# Patient Record
Sex: FEMALE | Race: WHITE | NOT HISPANIC OR LATINO | Employment: OTHER | URBAN - METROPOLITAN AREA
[De-identification: names, ages, dates, MRNs, and addresses within clinical notes are randomized per-mention and may not be internally consistent; named-entity substitution may affect disease eponyms.]

---

## 2017-12-28 ENCOUNTER — TRANSFERRED RECORDS (OUTPATIENT)
Dept: HEALTH INFORMATION MANAGEMENT | Facility: CLINIC | Age: 67
End: 2017-12-28

## 2018-01-12 RX ORDER — CETIRIZINE HYDROCHLORIDE 10 MG/1
10 TABLET ORAL DAILY
Status: ON HOLD | COMMUNITY
End: 2018-01-23

## 2018-01-12 RX ORDER — PRENATAL VIT/IRON FUM/FOLIC AC 27MG-0.8MG
1 TABLET ORAL DAILY
COMMUNITY
End: 2019-06-12

## 2018-01-12 RX ORDER — HYDROCODONE BITARTRATE AND ACETAMINOPHEN 5; 325 MG/1; MG/1
1 TABLET ORAL EVERY 6 HOURS PRN
Status: ON HOLD | COMMUNITY
End: 2018-01-23

## 2018-01-12 RX ORDER — MONTELUKAST SODIUM 10 MG/1
1 TABLET ORAL AT BEDTIME
COMMUNITY

## 2018-01-12 RX ORDER — FLUTICASONE PROPIONATE 50 MCG
2 SPRAY, SUSPENSION (ML) NASAL DAILY
Status: ON HOLD | COMMUNITY
End: 2020-01-07

## 2018-01-12 RX ORDER — ALBUTEROL SULFATE 90 UG/1
2 AEROSOL, METERED RESPIRATORY (INHALATION) 2 TIMES DAILY PRN
COMMUNITY

## 2018-01-23 ENCOUNTER — HOSPITAL ENCOUNTER (INPATIENT)
Facility: CLINIC | Age: 68
LOS: 2 days | Discharge: HOME | DRG: 470 | End: 2018-01-25
Attending: ORTHOPAEDIC SURGERY | Admitting: ORTHOPAEDIC SURGERY
Payer: COMMERCIAL

## 2018-01-23 ENCOUNTER — ANESTHESIA EVENT (OUTPATIENT)
Dept: SURGERY | Facility: CLINIC | Age: 68
DRG: 470 | End: 2018-01-23
Payer: COMMERCIAL

## 2018-01-23 ENCOUNTER — ANESTHESIA (OUTPATIENT)
Dept: SURGERY | Facility: CLINIC | Age: 68
DRG: 470 | End: 2018-01-23
Payer: COMMERCIAL

## 2018-01-23 ENCOUNTER — APPOINTMENT (OUTPATIENT)
Dept: PHYSICAL THERAPY | Facility: CLINIC | Age: 68
DRG: 470 | End: 2018-01-23
Attending: ORTHOPAEDIC SURGERY
Payer: COMMERCIAL

## 2018-01-23 DIAGNOSIS — Z96.651 STATUS POST TOTAL RIGHT KNEE REPLACEMENT: Primary | ICD-10-CM

## 2018-01-23 PROBLEM — Z96.659 TOTAL KNEE REPLACEMENT STATUS: Status: ACTIVE | Noted: 2018-01-23

## 2018-01-23 LAB — GLUCOSE BLDC GLUCOMTR-MCNC: 127 MG/DL (ref 70–99)

## 2018-01-23 PROCEDURE — 25000128 H RX IP 250 OP 636: Performed by: ORTHOPAEDIC SURGERY

## 2018-01-23 PROCEDURE — 25000132 ZZH RX MED GY IP 250 OP 250 PS 637: Mod: GY | Performed by: ORTHOPAEDIC SURGERY

## 2018-01-23 PROCEDURE — 25000128 H RX IP 250 OP 636: Performed by: ANESTHESIOLOGY

## 2018-01-23 PROCEDURE — 25000125 ZZHC RX 250: Performed by: NURSE ANESTHETIST, CERTIFIED REGISTERED

## 2018-01-23 PROCEDURE — 97110 THERAPEUTIC EXERCISES: CPT | Mod: GP

## 2018-01-23 PROCEDURE — 36000093 ZZH SURGERY LEVEL 4 1ST 30 MIN: Performed by: ORTHOPAEDIC SURGERY

## 2018-01-23 PROCEDURE — 40000170 ZZH STATISTIC PRE-PROCEDURE ASSESSMENT II: Performed by: ORTHOPAEDIC SURGERY

## 2018-01-23 PROCEDURE — 27110028 ZZH OR GENERAL SUPPLY NON-STERILE: Performed by: ORTHOPAEDIC SURGERY

## 2018-01-23 PROCEDURE — 71000012 ZZH RECOVERY PHASE 1 LEVEL 1 FIRST HR: Performed by: ORTHOPAEDIC SURGERY

## 2018-01-23 PROCEDURE — 40000193 ZZH STATISTIC PT WARD VISIT

## 2018-01-23 PROCEDURE — 00000146 ZZHCL STATISTIC GLUCOSE BY METER IP

## 2018-01-23 PROCEDURE — 0SRC0J9 REPLACEMENT OF RIGHT KNEE JOINT WITH SYNTHETIC SUBSTITUTE, CEMENTED, OPEN APPROACH: ICD-10-PCS | Performed by: ORTHOPAEDIC SURGERY

## 2018-01-23 PROCEDURE — 25000128 H RX IP 250 OP 636: Performed by: NURSE ANESTHETIST, CERTIFIED REGISTERED

## 2018-01-23 PROCEDURE — 36000063 ZZH SURGERY LEVEL 4 EA 15 ADDTL MIN: Performed by: ORTHOPAEDIC SURGERY

## 2018-01-23 PROCEDURE — 12000007 ZZH R&B INTERMEDIATE

## 2018-01-23 PROCEDURE — 25800025 ZZH RX 258: Performed by: ORTHOPAEDIC SURGERY

## 2018-01-23 PROCEDURE — 25000125 ZZHC RX 250: Performed by: ORTHOPAEDIC SURGERY

## 2018-01-23 PROCEDURE — 71000013 ZZH RECOVERY PHASE 1 LEVEL 1 EA ADDTL HR: Performed by: ORTHOPAEDIC SURGERY

## 2018-01-23 PROCEDURE — C1776 JOINT DEVICE (IMPLANTABLE): HCPCS | Performed by: ORTHOPAEDIC SURGERY

## 2018-01-23 PROCEDURE — 27210794 ZZH OR GENERAL SUPPLY STERILE: Performed by: ORTHOPAEDIC SURGERY

## 2018-01-23 PROCEDURE — 97530 THERAPEUTIC ACTIVITIES: CPT | Mod: GP

## 2018-01-23 PROCEDURE — 97161 PT EVAL LOW COMPLEX 20 MIN: CPT | Mod: GP

## 2018-01-23 PROCEDURE — 37000009 ZZH ANESTHESIA TECHNICAL FEE, EACH ADDTL 15 MIN: Performed by: ORTHOPAEDIC SURGERY

## 2018-01-23 PROCEDURE — 27810169 ZZH OR IMPLANT GENERAL: Performed by: ORTHOPAEDIC SURGERY

## 2018-01-23 PROCEDURE — A9270 NON-COVERED ITEM OR SERVICE: HCPCS | Mod: GY | Performed by: ORTHOPAEDIC SURGERY

## 2018-01-23 PROCEDURE — 97116 GAIT TRAINING THERAPY: CPT | Mod: GP

## 2018-01-23 PROCEDURE — 27210995 ZZH RX 272: Performed by: ORTHOPAEDIC SURGERY

## 2018-01-23 PROCEDURE — 37000008 ZZH ANESTHESIA TECHNICAL FEE, 1ST 30 MIN: Performed by: ORTHOPAEDIC SURGERY

## 2018-01-23 DEVICE — IMP TIB BASE JJ ATTUNE RP SZ4 CEM 150610004: Type: IMPLANTABLE DEVICE | Site: KNEE | Status: FUNCTIONAL

## 2018-01-23 DEVICE — IMPLANTABLE DEVICE: Type: IMPLANTABLE DEVICE | Site: KNEE | Status: FUNCTIONAL

## 2018-01-23 DEVICE — BONE CEMENT SIMPLEX W/TOBRAMYCIN 6197-9-001: Type: IMPLANTABLE DEVICE | Site: KNEE | Status: FUNCTIONAL

## 2018-01-23 RX ORDER — OXYCODONE HYDROCHLORIDE 5 MG/1
5-10 TABLET ORAL EVERY 4 HOURS PRN
Status: DISCONTINUED | OUTPATIENT
Start: 2018-01-23 | End: 2018-01-24

## 2018-01-23 RX ORDER — ONDANSETRON 2 MG/ML
4 INJECTION INTRAMUSCULAR; INTRAVENOUS EVERY 6 HOURS PRN
Status: DISCONTINUED | OUTPATIENT
Start: 2018-01-23 | End: 2018-01-25 | Stop reason: HOSPADM

## 2018-01-23 RX ORDER — LORATADINE 10 MG/1
1 TABLET ORAL EVERY MORNING
COMMUNITY

## 2018-01-23 RX ORDER — PROPOFOL 10 MG/ML
INJECTION, EMULSION INTRAVENOUS CONTINUOUS PRN
Status: DISCONTINUED | OUTPATIENT
Start: 2018-01-23 | End: 2018-01-23

## 2018-01-23 RX ORDER — FENTANYL CITRATE 50 UG/ML
25-50 INJECTION, SOLUTION INTRAMUSCULAR; INTRAVENOUS EVERY 5 MIN PRN
Status: DISCONTINUED | OUTPATIENT
Start: 2018-01-23 | End: 2018-01-23 | Stop reason: HOSPADM

## 2018-01-23 RX ORDER — CEFAZOLIN SODIUM 1 G
1 VIAL (EA) INJECTION SEE ADMIN INSTRUCTIONS
Status: DISCONTINUED | OUTPATIENT
Start: 2018-01-23 | End: 2018-01-23 | Stop reason: HOSPADM

## 2018-01-23 RX ORDER — LIDOCAINE 40 MG/G
CREAM TOPICAL
Status: DISCONTINUED | OUTPATIENT
Start: 2018-01-23 | End: 2018-01-25 | Stop reason: HOSPADM

## 2018-01-23 RX ORDER — SODIUM CHLORIDE, SODIUM LACTATE, POTASSIUM CHLORIDE, CALCIUM CHLORIDE 600; 310; 30; 20 MG/100ML; MG/100ML; MG/100ML; MG/100ML
INJECTION, SOLUTION INTRAVENOUS CONTINUOUS
Status: DISCONTINUED | OUTPATIENT
Start: 2018-01-23 | End: 2018-01-23 | Stop reason: HOSPADM

## 2018-01-23 RX ORDER — HYDROMORPHONE HYDROCHLORIDE 1 MG/ML
.3-.5 INJECTION, SOLUTION INTRAMUSCULAR; INTRAVENOUS; SUBCUTANEOUS EVERY 5 MIN PRN
Status: DISCONTINUED | OUTPATIENT
Start: 2018-01-23 | End: 2018-01-23 | Stop reason: HOSPADM

## 2018-01-23 RX ORDER — LORATADINE 10 MG/1
10 TABLET ORAL DAILY
Status: DISCONTINUED | OUTPATIENT
Start: 2018-01-23 | End: 2018-01-25 | Stop reason: HOSPADM

## 2018-01-23 RX ORDER — MONTELUKAST SODIUM 10 MG/1
10 TABLET ORAL DAILY
Status: DISCONTINUED | OUTPATIENT
Start: 2018-01-23 | End: 2018-01-25 | Stop reason: HOSPADM

## 2018-01-23 RX ORDER — ONDANSETRON 4 MG/1
4 TABLET, ORALLY DISINTEGRATING ORAL EVERY 30 MIN PRN
Status: DISCONTINUED | OUTPATIENT
Start: 2018-01-23 | End: 2018-01-23 | Stop reason: HOSPADM

## 2018-01-23 RX ORDER — ACETAMINOPHEN 325 MG/1
975 TABLET ORAL EVERY 8 HOURS
Status: DISCONTINUED | OUTPATIENT
Start: 2018-01-23 | End: 2018-01-25 | Stop reason: HOSPADM

## 2018-01-23 RX ORDER — ONDANSETRON 4 MG/1
4 TABLET, ORALLY DISINTEGRATING ORAL EVERY 6 HOURS PRN
Status: DISCONTINUED | OUTPATIENT
Start: 2018-01-23 | End: 2018-01-25 | Stop reason: HOSPADM

## 2018-01-23 RX ORDER — CHLORAL HYDRATE 500 MG
1000 CAPSULE ORAL DAILY
Status: DISCONTINUED | OUTPATIENT
Start: 2018-01-23 | End: 2018-01-25 | Stop reason: HOSPADM

## 2018-01-23 RX ORDER — AMOXICILLIN 250 MG
1-2 CAPSULE ORAL 2 TIMES DAILY
Status: DISCONTINUED | OUTPATIENT
Start: 2018-01-23 | End: 2018-01-25 | Stop reason: HOSPADM

## 2018-01-23 RX ORDER — BUPIVACAINE HYDROCHLORIDE 7.5 MG/ML
INJECTION, SOLUTION INTRASPINAL PRN
Status: DISCONTINUED | OUTPATIENT
Start: 2018-01-23 | End: 2018-01-23

## 2018-01-23 RX ORDER — ONDANSETRON 2 MG/ML
INJECTION INTRAMUSCULAR; INTRAVENOUS PRN
Status: DISCONTINUED | OUTPATIENT
Start: 2018-01-23 | End: 2018-01-23

## 2018-01-23 RX ORDER — KETOROLAC TROMETHAMINE 15 MG/ML
15 INJECTION, SOLUTION INTRAMUSCULAR; INTRAVENOUS EVERY 6 HOURS PRN
Status: DISCONTINUED | OUTPATIENT
Start: 2018-01-23 | End: 2018-01-24

## 2018-01-23 RX ORDER — NALOXONE HYDROCHLORIDE 0.4 MG/ML
.1-.4 INJECTION, SOLUTION INTRAMUSCULAR; INTRAVENOUS; SUBCUTANEOUS
Status: DISCONTINUED | OUTPATIENT
Start: 2018-01-23 | End: 2018-01-25 | Stop reason: HOSPADM

## 2018-01-23 RX ORDER — ALBUTEROL SULFATE 90 UG/1
2 AEROSOL, METERED RESPIRATORY (INHALATION) EVERY 6 HOURS PRN
Status: DISCONTINUED | OUTPATIENT
Start: 2018-01-23 | End: 2018-01-25 | Stop reason: HOSPADM

## 2018-01-23 RX ORDER — FENTANYL CITRATE 50 UG/ML
INJECTION, SOLUTION INTRAMUSCULAR; INTRAVENOUS PRN
Status: DISCONTINUED | OUTPATIENT
Start: 2018-01-23 | End: 2018-01-23

## 2018-01-23 RX ORDER — LIDOCAINE 50 MG/G
1 PATCH TOPICAL DAILY PRN
COMMUNITY
End: 2022-11-10

## 2018-01-23 RX ORDER — SODIUM CHLORIDE, SODIUM LACTATE, POTASSIUM CHLORIDE, CALCIUM CHLORIDE 600; 310; 30; 20 MG/100ML; MG/100ML; MG/100ML; MG/100ML
INJECTION, SOLUTION INTRAVENOUS CONTINUOUS
Status: DISCONTINUED | OUTPATIENT
Start: 2018-01-23 | End: 2018-01-25 | Stop reason: HOSPADM

## 2018-01-23 RX ORDER — ONDANSETRON 2 MG/ML
4 INJECTION INTRAMUSCULAR; INTRAVENOUS EVERY 30 MIN PRN
Status: DISCONTINUED | OUTPATIENT
Start: 2018-01-23 | End: 2018-01-23 | Stop reason: HOSPADM

## 2018-01-23 RX ORDER — MAGNESIUM OXIDE 400 MG/1
400 TABLET ORAL DAILY
Status: DISCONTINUED | OUTPATIENT
Start: 2018-01-23 | End: 2018-01-25 | Stop reason: HOSPADM

## 2018-01-23 RX ORDER — HYDROMORPHONE HYDROCHLORIDE 1 MG/ML
.3-.5 INJECTION, SOLUTION INTRAMUSCULAR; INTRAVENOUS; SUBCUTANEOUS
Status: DISCONTINUED | OUTPATIENT
Start: 2018-01-23 | End: 2018-01-25 | Stop reason: HOSPADM

## 2018-01-23 RX ORDER — LABETALOL HYDROCHLORIDE 5 MG/ML
10 INJECTION, SOLUTION INTRAVENOUS
Status: DISCONTINUED | OUTPATIENT
Start: 2018-01-23 | End: 2018-01-23 | Stop reason: HOSPADM

## 2018-01-23 RX ORDER — ACETAMINOPHEN 325 MG/1
650 TABLET ORAL EVERY 4 HOURS PRN
Status: DISCONTINUED | OUTPATIENT
Start: 2018-01-26 | End: 2018-01-25 | Stop reason: HOSPADM

## 2018-01-23 RX ORDER — NALOXONE HYDROCHLORIDE 0.4 MG/ML
.1-.4 INJECTION, SOLUTION INTRAMUSCULAR; INTRAVENOUS; SUBCUTANEOUS
Status: ACTIVE | OUTPATIENT
Start: 2018-01-23 | End: 2018-01-24

## 2018-01-23 RX ORDER — FLUTICASONE PROPIONATE 50 MCG
2 SPRAY, SUSPENSION (ML) NASAL DAILY
Status: DISCONTINUED | OUTPATIENT
Start: 2018-01-23 | End: 2018-01-25 | Stop reason: HOSPADM

## 2018-01-23 RX ORDER — DEXAMETHASONE SODIUM PHOSPHATE 4 MG/ML
INJECTION, SOLUTION INTRA-ARTICULAR; INTRALESIONAL; INTRAMUSCULAR; INTRAVENOUS; SOFT TISSUE PRN
Status: DISCONTINUED | OUTPATIENT
Start: 2018-01-23 | End: 2018-01-23

## 2018-01-23 RX ORDER — HYDROXYZINE HYDROCHLORIDE 10 MG/1
10 TABLET, FILM COATED ORAL EVERY 6 HOURS PRN
Status: DISCONTINUED | OUTPATIENT
Start: 2018-01-23 | End: 2018-01-25 | Stop reason: HOSPADM

## 2018-01-23 RX ADMIN — MIDAZOLAM 1 MG: 1 INJECTION INTRAMUSCULAR; INTRAVENOUS at 07:39

## 2018-01-23 RX ADMIN — ASPIRIN 325 MG: 325 TABLET, DELAYED RELEASE ORAL at 20:39

## 2018-01-23 RX ADMIN — CEFAZOLIN SODIUM 2 G: 2 SOLUTION INTRAVENOUS at 16:34

## 2018-01-23 RX ADMIN — ACETAMINOPHEN 975 MG: 325 TABLET, FILM COATED ORAL at 17:24

## 2018-01-23 RX ADMIN — SODIUM CHLORIDE, POTASSIUM CHLORIDE, SODIUM LACTATE AND CALCIUM CHLORIDE: 600; 310; 30; 20 INJECTION, SOLUTION INTRAVENOUS at 06:28

## 2018-01-23 RX ADMIN — SENNOSIDES AND DOCUSATE SODIUM 1 TABLET: 8.6; 5 TABLET ORAL at 20:41

## 2018-01-23 RX ADMIN — KETOROLAC TROMETHAMINE 15 MG: 15 INJECTION, SOLUTION INTRAMUSCULAR; INTRAVENOUS at 13:11

## 2018-01-23 RX ADMIN — HYDROMORPHONE HYDROCHLORIDE 0.5 MG: 1 INJECTION, SOLUTION INTRAMUSCULAR; INTRAVENOUS; SUBCUTANEOUS at 20:39

## 2018-01-23 RX ADMIN — PHENYLEPHRINE HYDROCHLORIDE 200 MCG: 10 INJECTION INTRAVENOUS at 08:45

## 2018-01-23 RX ADMIN — PHENYLEPHRINE HYDROCHLORIDE 100 MCG: 10 INJECTION INTRAVENOUS at 08:12

## 2018-01-23 RX ADMIN — KETOROLAC TROMETHAMINE 15 MG: 15 INJECTION, SOLUTION INTRAMUSCULAR; INTRAVENOUS at 18:56

## 2018-01-23 RX ADMIN — PHENYLEPHRINE HYDROCHLORIDE 100 MCG: 10 INJECTION INTRAVENOUS at 08:22

## 2018-01-23 RX ADMIN — PHENYLEPHRINE HYDROCHLORIDE 100 MCG: 10 INJECTION INTRAVENOUS at 08:37

## 2018-01-23 RX ADMIN — ONDANSETRON 4 MG: 2 INJECTION INTRAMUSCULAR; INTRAVENOUS at 09:40

## 2018-01-23 RX ADMIN — FENTANYL CITRATE 50 MCG: 50 INJECTION, SOLUTION INTRAMUSCULAR; INTRAVENOUS at 07:39

## 2018-01-23 RX ADMIN — PHENYLEPHRINE HYDROCHLORIDE 100 MCG: 10 INJECTION INTRAVENOUS at 08:31

## 2018-01-23 RX ADMIN — CEFAZOLIN SODIUM 1 G: 2 SOLUTION INTRAVENOUS at 09:45

## 2018-01-23 RX ADMIN — TRANEXAMIC ACID 1 G: 100 INJECTION, SOLUTION INTRAVENOUS at 07:55

## 2018-01-23 RX ADMIN — ROPIVACAINE HYDROCHLORIDE 25 ML GIVEN: 5 INJECTION, SOLUTION EPIDURAL; INFILTRATION; PERINEURAL at 07:11

## 2018-01-23 RX ADMIN — PHENYLEPHRINE HYDROCHLORIDE 0.25 MCG/KG/MIN: 10 INJECTION, SOLUTION INTRAMUSCULAR; INTRAVENOUS; SUBCUTANEOUS at 08:55

## 2018-01-23 RX ADMIN — PHENYLEPHRINE HYDROCHLORIDE 100 MCG: 10 INJECTION INTRAVENOUS at 08:01

## 2018-01-23 RX ADMIN — SODIUM CHLORIDE, POTASSIUM CHLORIDE, SODIUM LACTATE AND CALCIUM CHLORIDE: 600; 310; 30; 20 INJECTION, SOLUTION INTRAVENOUS at 15:00

## 2018-01-23 RX ADMIN — PHENYLEPHRINE HYDROCHLORIDE 100 MCG: 10 INJECTION INTRAVENOUS at 09:23

## 2018-01-23 RX ADMIN — MIDAZOLAM HYDROCHLORIDE 1 MG: 1 INJECTION, SOLUTION INTRAMUSCULAR; INTRAVENOUS at 07:16

## 2018-01-23 RX ADMIN — MIDAZOLAM 1 MG: 1 INJECTION INTRAMUSCULAR; INTRAVENOUS at 07:36

## 2018-01-23 RX ADMIN — CEFAZOLIN SODIUM 2 G: 2 SOLUTION INTRAVENOUS at 07:45

## 2018-01-23 RX ADMIN — DEXAMETHASONE SODIUM PHOSPHATE 4 MG: 4 INJECTION, SOLUTION INTRA-ARTICULAR; INTRALESIONAL; INTRAMUSCULAR; INTRAVENOUS; SOFT TISSUE at 08:35

## 2018-01-23 RX ADMIN — ONDANSETRON 4 MG: 2 INJECTION INTRAMUSCULAR; INTRAVENOUS at 10:44

## 2018-01-23 RX ADMIN — SODIUM CHLORIDE, POTASSIUM CHLORIDE, SODIUM LACTATE AND CALCIUM CHLORIDE: 600; 310; 30; 20 INJECTION, SOLUTION INTRAVENOUS at 08:46

## 2018-01-23 RX ADMIN — BUPIVACAINE HYDROCHLORIDE IN DEXTROSE 10.5 MG: 7.5 INJECTION, SOLUTION SUBARACHNOID at 07:46

## 2018-01-23 RX ADMIN — HYDROMORPHONE HYDROCHLORIDE 0.5 MG: 1 INJECTION, SOLUTION INTRAMUSCULAR; INTRAVENOUS; SUBCUTANEOUS at 16:34

## 2018-01-23 RX ADMIN — PROPOFOL 100 MCG/KG/MIN: 10 INJECTION, EMULSION INTRAVENOUS at 07:45

## 2018-01-23 RX ADMIN — MONTELUKAST SODIUM 10 MG: 10 TABLET, FILM COATED ORAL at 18:56

## 2018-01-23 RX ADMIN — TRANEXAMIC ACID 1 G: 100 INJECTION, SOLUTION INTRAVENOUS at 09:42

## 2018-01-23 ASSESSMENT — ACTIVITIES OF DAILY LIVING (ADL)
FALL_HISTORY_WITHIN_LAST_SIX_MONTHS: NO
RETIRED_EATING: 0-->INDEPENDENT
AMBULATION: 0-->INDEPENDENT
COGNITION: 0 - NO COGNITION ISSUES REPORTED
TOILETING: 0-->INDEPENDENT
SWALLOWING: 0-->SWALLOWS FOODS/LIQUIDS WITHOUT DIFFICULTY
DRESS: 0-->INDEPENDENT
TRANSFERRING: 0-->INDEPENDENT
BATHING: 0-->INDEPENDENT
RETIRED_COMMUNICATION: 0-->UNDERSTANDS/COMMUNICATES WITHOUT DIFFICULTY

## 2018-01-23 ASSESSMENT — LIFESTYLE VARIABLES: TOBACCO_USE: 1

## 2018-01-23 NOTE — PROGRESS NOTES
01/23/18 1500   Quick Adds   Type of Visit Initial PT Evaluation   Living Environment   Lives With spouse   Living Arrangements house   Home Accessibility stairs to enter home;stairs within home   Number of Stairs to Enter Home 3  (no rail)   Number of Stairs Within Home 13  (L rail descending)   Transportation Available car;family or friend will provide  (pt drives at baseline, spouse able to assist)   Living Environment Comment pt reports has been ascending/descending stairs to basement by going sideways and B UE support to railing   Self-Care   Dominant Hand right   Usual Activity Tolerance good   Current Activity Tolerance moderate   Regular Exercise no   Equipment Currently Used at Home none   Activity/Exercise/Self-Care Comment has a cane   Functional Level Prior   Ambulation 0-->independent   Transferring 0-->independent   Toileting 0-->independent   Bathing 0-->independent   Dressing 0-->independent   Eating 0-->independent   Communication 0-->understands/communicates without difficulty   Swallowing 0-->swallows foods/liquids without difficulty   Cognition 0 - no cognition issues reported   Fall history within last six months no   Which of the above functional risks had a recent onset or change? ambulation;transferring   General Information   Onset of Illness/Injury or Date of Surgery - Date 01/23/18   Referring Physician Ambar Wilson MD   Patient/Family Goals Statement home with assist PRN and OPPT   Pertinent History of Current Problem (include personal factors and/or comorbidities that impact the POC) POD0 R TKA   Precautions/Limitations fall precautions  (O2 sats stable on RA, removed from supplemental)   Weight-Bearing Status - RLE weight-bearing as tolerated   General Observations KI until IND with SLR   General Info Comments activity: up in chair, ambulate with assist   Cognitive Status Examination   Orientation orientation to person, place and time   Level of Consciousness alert   Follows  Commands and Answers Questions 100% of the time;able to follow multistep instructions   Personal Safety and Judgment intact   Memory intact   Pain Assessment   Patient Currently in Pain No  (denies at rest)   Integumentary/Edema   Integumentary/Edema Comments wrap/bandage in place from midthigh to toes, drain /zabala cath/peripheral IV present   Posture    Posture Not impaired   Range of Motion (ROM)   ROM Comment L LE WFL; R knee 14-44   Strength   Strength Comments pt IND with R SLR; L LE WFL   Bed Mobility   Bed Mobility Comments pt completes supine>sit with CGA at trunk, HOB elevated   Transfer Skills   Transfer Comments pt completes sit<>stand with CGA, strong use of UEs to assist   Gait   Gait Comments pt ambulates x4' at EOB with FWW and CGA, no KI, no knee buckling demonstrated   Balance   Balance Comments no overt LOB or unsteadiness with mobility, strong reliance on FWW at this time 2/2 reduced WBing to R LE post op   Sensory Examination   Sensory Perception Comments pt denies numbness or tingling   General Therapy Interventions   Planned Therapy Interventions balance training;bed mobility training;gait training;neuromuscular re-education;ROM;strengthening;stretching;transfer training;home program guidelines;progressive activity/exercise   Clinical Impression   Criteria for Skilled Therapeutic Intervention yes, treatment indicated   PT Diagnosis difficulty with gait   Influenced by the following impairments decreased strength, ROM, balance, activity tolerance; post op pain   Functional limitations due to impairments difficulty with bed mobilty, transfers, gait , stairs   Clinical Presentation Stable/Uncomplicated   Clinical Presentation Rationale POD0 R TKA, no known complications, pain tolerable, VSS on RA   Clinical Decision Making (Complexity) Low complexity   Therapy Frequency` 2 times/day   Predicted Duration of Therapy Intervention (days/wks) 3 days   Anticipated Equipment Needs at Discharge front  "wheeled walker  (DME completed)   Anticipated Discharge Disposition Home with Assist;Home with Outpatient Therapy   Risk & Benefits of therapy have been explained Yes   Patient, Family & other staff in agreement with plan of care Yes   Clinical Impression Comments  present during session and in agreement with pt; OP PT appointments already scheduled   [facility] TM \"6 Clicks\"   2016, Trustees of [facility], under license to [employer].  All rights reserved.   6 Clicks Short Forms Basic Mobility Inpatient Short Form   [facility]  \"6 Clicks\" V.2 Basic Mobility Inpatient Short Form   1. Turning from your back to your side while in a flat bed without using bedrails? 4 - None   2. Moving from lying on your back to sitting on the side of a flat bed without using bedrails? 3 - A Little   3. Moving to and from a bed to a chair (including a wheelchair)? 3 - A Little   4. Standing up from a chair using your arms (e.g., wheelchair, or bedside chair)? 3 - A Little   5. To walk in hospital room? 3 - A Little   6. Climbing 3-5 steps with a railing? 3 - A Little   Basic Mobility Raw Score (Score out of 24.Lower scores equate to lower levels of function) 19   Total Evaluation Time   Total Evaluation Time (Minutes) 10     "

## 2018-01-23 NOTE — IP AVS SNAPSHOT
MRN:[id number]                      After Visit Summary   1/23/2018    Melissa Templeton    MRN: [id number]           Thank you!     Thank you for choosing [facility] for your care. Our goal is always to provide you with excellent care. Hearing back from our patients is one way we can continue to improve our services. Please take a few minutes to complete the written survey that you may receive in the mail after you visit with us. Thank you!        Patient Information     Date Of Birth          1950        Designated Caregiver       Most Recent Value    Caregiver    Will someone help with your care after discharge? yes    Name of designated caregiver Harley    Phone number of caregiver [phone]    Caregiver address [address]  [place]      About your hospital stay     You were admitted on:  January 23, 2018 You last received care in the:  [place] [facility] [address] [facility]    You were discharged on:  January 25, 2018        Reason for your hospital stay       Patient admitted status post RTKA                  Who to Call     For medical emergencies, please call 911.  For non-urgent questions about your medical care, please call your primary care provider or clinic, [phone]  For questions related to your surgery, please call your surgery clinic        Attending Provider     Provider Specialty    Ambar Wilson MD Orthopedics       Primary Care Provider Office Phone # Fax #    Dimple Grigsby [phone]      After Care Instructions     Activity       Your activity upon discharge: activity as tolerated            Diet       Follow this diet upon discharge: Orders Placed This Encounter      Room Service      Regular Diet Adult            Discharge Instructions       Patient is to slowly progress back into their activities over the next several weeks.   They are to keep the Prineo(mesh) dressing in place at all times and do not remove it.   The island  dressing can be removed. You can shower but try and keep the incision site covered and do not get it wet. Do not at any point in time, submerge the incision or soak the incision.   Monitor the wound closely for any foul smelling or abnormal discharge. Any temperatures over 101.5 with chills, or any redness about the incision please contact our office immediately. If you experience any chest pains, or shortness of breath, go directly to the emergency department.   Follow up with Dr. Wilson in approximately 2 weeks and call if you have any complaints between now and your follow up.   Call [phone] to schedule your follow up if you do not already have one scheduled.   Make sure to work with physical therapy regularly and the days that you do not have a physical therapy appointment, you must do the exercises on your own.   Make sure that you have someone with you when you get up and move around the house and make sure you use some form of ambulatory assistance at all times, preferably a walker.   Take aspirin for prevention of blood clots  Take oxycodone for pain control  Take senokot to help with constipation  Take atarax to help with spasms/itching/relaxation  Take zofran to help with nausea                  Follow-up Appointments     Follow-up and recommended labs and tests        Call [phone] to schedule your appointment. Follow up with us in 2 weeks.                  Additional Services     Occupational Therapy Referral       *This therapy referral will be filtered to a centralized scheduling office at [facility] and the patient will receive a call to schedule an appointment at a [place] location most convenient for them. *     [facility] provides Occupational Therapy evaluation and treatment and many specialty services across the [place] system.  If requesting a specialty program, please choose from the list below.    If you have not heard from the  "scheduling office within 2 business days, please call [phone] for all locations, with the exception of [place], please call [phone].     Treatment: Evaluation & Treatment      Please be aware that coverage of these services is subject to the terms and limitations of your health insurance plan.  Call member services at your health plan with any benefit or coverage questions.      **Note to Provider:  If you are referring outside of [place] for the therapy appointment, please list the name of the location in the \"special instructions\" above, print the referral and give to the patient to schedule the appointment.            Physical Therapy Referral       *This therapy referral will be filtered to a centralized scheduling office at [facility] and the patient will receive a call to schedule an appointment at a [place] location most convenient for them. *     [facility] provides Physical Therapy evaluation and treatment and many specialty services across the [place] system.  If requesting a specialty program, please choose from the list below.    If you have not heard from the scheduling office within 2 business days, please call [phone] for all locations, with the exception of [place], please call [phone].  Treatment: Evaluation & Treatment      Please be aware that coverage of these services is subject to the terms and limitations of your health insurance plan.  Call member services at your health plan with any benefit or coverage questions.      **Note to Provider:  If you are referring outside of [place] for the therapy appointment, please list the name of the location in the \"special instructions\" above, print the referral and give to the patient to schedule the appointment.                  Pending Results     No orders found from 1/21/2018 to 1/24/2018.            Statement of Approval     Ordered          01/25/18 1037  I have reviewed and agree with " "all the recommendations and orders detailed in this document.  EFFECTIVE NOW     Approved and electronically signed by:  Vito Garcia PA-C           18 1037  I have reviewed and agree with all the recommendations and orders detailed in this document.     Approved and electronically signed by:  Vito Garcia PA-C             Admission Information     Date & Time Provider Department Dept. Phone    2018 Ambar Wilson MD [place] [address] [facility] [phone]      Your Vitals Were     Blood Pressure Pulse Temperature Respirations Height Weight    126/73 (BP Location: Left arm) 93 98.2  F (36.8  C) (Oral) 18 1.524 m (5') 82.1 kg (180 lb 14.4 oz)    Pulse Oximetry BMI (Body Mass Index)                91% 35.33 kg/m2          [employer] Information     [employer] lets you send messages to your doctor, view your test results, renew your prescriptions, schedule appointments and more. To sign up, go to [email or web address] . Click on \"Log in\" on the left side of the screen, which will take you to the Welcome page. Then click on \"Sign up Now\" on the right side of the page.     You will be asked to enter the access code listed below, as well as some personal information. Please follow the directions to create your username and password.     Your access code is: [email or web address]  Expires: 2018 10:44 AM     Your access code will  in 90 days. If you need help or a new code, please call your [place] clinic or [phone].        Care EveryWhere ID     This is your Care EveryWhere ID. This could be used by other organizations to access your [place] medical records  [id number]        Equal Access to Services     KEEGAN HERNANDEZ : Mary Galo, tomás shaffer, zoila lindsey, robert lopez. So [facility] [phone].    ATENCIÓN: Si habla español, tiene a nava disposición servicios gratuitos de asistencia lingüística. Llame al " [phone].    We comply with applicable federal civil rights laws and [place] laws. We do not discriminate on the basis of race, color, national origin, age, disability, sex, sexual orientation, or gender identity.               Review of your medicines      START taking        Dose / Directions    hydrOXYzine 10 MG tablet   Commonly known as:  ATARAX        Dose:  10 mg   Take 1 tablet (10 mg) by mouth every 6 hours as needed for itching   Quantity:  40 tablet   Refills:  0       ondansetron 4 MG ODT tab   Commonly known as:  ZOFRAN-ODT        Dose:  4 mg   Take 1 tablet (4 mg) by mouth every 6 hours as needed for nausea or vomiting   Quantity:  40 tablet   Refills:  0       order for DME        Equipment being ordered: Walker Wheels () and Walker () Treatment Diagnosis: difficulty with gait   Quantity:  1 each   Refills:  0       oxyCODONE IR 5 MG tablet   Commonly known as:  ROXICODONE        Dose:  5-10 mg   Take 1-2 tablets (5-10 mg) by mouth every 3 hours as needed for moderate to severe pain   Quantity:  30 tablet   Refills:  0       senna-docusate 8.6-50 MG per tablet   Commonly known as:  SENOKOT-S;PERICOLACE        Dose:  1-2 tablet   Take 1-2 tablets by mouth 2 times daily   Quantity:  40 tablet   Refills:  0         CONTINUE these medicines which may have CHANGED, or have new prescriptions. If we are uncertain of the size of tablets/capsules you have at home, strength may be listed as something that might have changed.        Dose / Directions    aspirin 325 MG EC tablet   This may have changed:    - medication strength  - how much to take  - when to take this        Dose:  325 mg   Take 1 tablet (325 mg) by mouth 2 times daily   Quantity:  60 tablet   Refills:  0         CONTINUE these medicines which have NOT CHANGED        Dose / Directions    albuterol 108 (90 BASE) MCG/ACT Inhaler   Commonly known as:  PROAIR HFA/PROVENTIL HFA/VENTOLIN HFA        Dose:  2 puff   Inhale 2 puffs into  the lungs every 6 hours as needed for shortness of breath / dyspnea or wheezing   Refills:  0       BREO ELLIPTA IN        Inhale into the lungs daily   Refills:  0       CITRACAL SLOW RELEASE 600- MG-MG-UNIT Tb24   Generic drug:  Calcium-Magnesium-Vitamin D        Dose:  1 tablet   Take 1 tablet by mouth 2 times daily   Refills:  0       diclofenac 1 % Gel topical gel   Commonly known as:  VOLTAREN        Place onto the skin 3 times daily as needed for moderate pain   Refills:  0       FIBER ADULT GUMMIES PO        Dose:  2 chew tab   Take 2 chew tab by mouth daily (FIBERWELL)   Refills:  0       FISH OIL PO        Dose:  1200 mg   Take 1,200 mg by mouth daily   Refills:  0       fluticasone 50 MCG/ACT spray   Commonly known as:  FLONASE        Dose:  2 spray   Spray 2 sprays into both nostrils daily   Refills:  0       lidocaine 5 % Patch   Commonly known as:  LIDODERM        Dose:  1 patch   Place 1 patch onto the skin daily as needed for moderate pain   Refills:  0       LORATADINE PO        Dose:  10 mg   Take 10 mg by mouth daily   Refills:  0       MUCINEX PO        Dose:  1200 mg   Take 1,200 mg by mouth daily   Refills:  0       NARCOSOFT HERBAL LAX PO        Dose:  1-2 capsule   Take 1-2 capsules by mouth daily (HERB-LAX)   Refills:  0       order for DME   Used for:  Metatarsal fracture        Equipment being ordered: rt short cam walker   Quantity:  1 each   Refills:  0       PREDNISONE PO        Dose:  20 mg   Take 20 mg by mouth daily as needed (to clear lungs)   Refills:  0       prenatal multivitamin plus iron 27-0.8 MG Tabs per tablet        Dose:  1 tablet   Take 1 tablet by mouth daily   Refills:  0       PRESERVISION/LUTEIN PO        Dose:  1 tablet   Take 1 tablet by mouth 2 times daily   Refills:  0       SINGULAIR PO        Dose:  10 mg   Take 10 mg by mouth daily   Refills:  0       SUDAFED PO        Dose:  120 mg   Take 120 mg by mouth 2 times daily as needed   Refills:  0        TYLENOL PO        Dose:  650 mg   Take 650 mg by mouth 4 times daily as needed for mild pain or fever   Refills:  0       VITAMIN D (CHOLECALCIFEROL) PO        Dose:  2000 Units   Take 2,000 Units by mouth three times a week   Refills:  0         STOP taking     GLUCOSAMINE-CHONDROITIN PO           MELOXICAM PO                Where to get your medicines      These medications were sent to [facility] [place], [place] - [address]  [address], [place] [address]     Phone:  [phone]     aspirin 325 MG EC tablet    hydrOXYzine 10 MG tablet    ondansetron 4 MG ODT tab    senna-docusate 8.6-50 MG per tablet         Some of these will need a paper prescription and others can be bought over the counter. Ask your nurse if you have questions.     Bring a paper prescription for each of these medications     order for DME    oxyCODONE IR 5 MG tablet                Protect others around you: Learn how to safely use, store and throw away your medicines at www.disposemymeds.org.        Information about OPIOIDS     PRESCRIPTION OPIOIDS: WHAT YOU NEED TO KNOW    Prescription opioids can be used to help relieve moderate to severe pain and are often prescribed following a surgery or injury, or for certain health conditions. These medications can be an important part of treatment but also come with serious risks. It is important to work with your health care provider to make sure you are getting the safest, most effective care.    WHAT ARE THE RISKS AND SIDE EFFECTS OF OPIOID USE?  Prescription opioids carry serious risks of addiction and overdose, especially with prolonged use. An opioid overdose, often marked by slowed breathing can cause sudden death. The use of prescription opioids can have a number of side effects as well, even when taken as directed:      Tolerance - meaning you might need to take more of a medication for the same pain relief    Physical dependence - meaning you have symptoms  of withdrawal when a medication is stopped    Increased sensitivity to pain    Constipation    Nausea, vomiting, and dry mouth    Sleepiness and dizziness    Confusion    Depression    Low levels of testosterone that can result in lower sex drive, energy, and strength    Itching and sweating    RISKS ARE GREATER WITH:    History of drug misuse, substance use disorder, or overdose    Mental health conditions (such as depression or anxiety)    Sleep apnea    Older age (65 years or older)    Pregnancy    Avoid alcohol while taking prescription opioids.   Also, unless specifically advised by your health care provider, medications to avoid include:    Benzodiazepines (such as Xanax or Valium)    Muscle relaxants (such as Soma or Flexeril)    Hypnotics (such as Ambien or Lunesta)    Other prescription opioids    KNOW YOUR OPTIONS:  Talk to your health care provider about ways to manage your pain that do not involve prescription opioids. Some of these options may actually work better and have fewer risks and side effects:    Pain relievers such as acetaminophen, ibuprofen, and naproxen    Some medications that are also used for depression or seizures    Physical therapy and exercise    Cognitive behavioral therapy, a psychological, goal-directed approach, in which patients learn how to modify physical, behavioral, and emotional triggers of pain and stress    IF YOU ARE PRESCRIBED OPIOIDS FOR PAIN:    Never take opioids in greater amounts or more often than prescribed    Follow up with your primary health care provider and work together to create a plan on how to manage your pain.    Talk about ways to help manage your pain that do not involve prescription opioids    Talk about all concerns and side effects    Help prevent misuse and abuse    Never sell or share prescription opioids    Never use another person's prescription opioids    Store prescription opioids in a secure place and out of reach of others (this may include  visitors, children, friends, and family)    Visit www.cdc.gov/drugoverdose to learn about risks of opioid abuse and overdose    If you believe you may be struggling with addiction, tell your health care provider and ask for guidance or call [facility]'s National Helpline at 0-552-332-HELP    LEARN MORE / www.cdc.gov/drugoverdose/prescribing/guideline.html    Safely dispose of unused prescription opioids: Find your local drug take-back programs and more information about the importance of safe disposal at www.doseofreality.[place].gov             Medication List: This is a list of all your medications and when to take them. Check marks below indicate your daily home schedule. Keep this list as a reference.      Medications           Morning Afternoon Evening Bedtime As Needed    albuterol 108 (90 BASE) MCG/ACT Inhaler   Commonly known as:  PROAIR HFA/PROVENTIL HFA/VENTOLIN HFA   Inhale 2 puffs into the lungs every 6 hours as needed for shortness of breath / dyspnea or wheezing   Next Dose Due:  Resume on discharge.                                 aspirin 325 MG EC tablet   Take 1 tablet (325 mg) by mouth 2 times daily   Last time this was given:  325 mg on 1/25/2018  8:15 AM   Next Dose Due:  1/25/18                                   BREO ELLIPTA IN   Inhale into the lungs daily   Next Dose Due:  Resume on discharge.                                 CITRACAL SLOW RELEASE 600- MG-MG-UNIT Tb24   Take 1 tablet by mouth 2 times daily   Generic drug:  Calcium-Magnesium-Vitamin D   Next Dose Due:  Resume on discharge.                                 diclofenac 1 % Gel topical gel   Commonly known as:  VOLTAREN   Place onto the skin 3 times daily as needed for moderate pain   Next Dose Due:  Resume on discharge.                                 FIBER ADULT GUMMIES PO   Take 2 chew tab by mouth daily (FIBERWELL)   Next Dose Due:  Resume on discharge.                                 FISH OIL PO   Take 1,200 mg by mouth daily    Last time this was given:  1,000 mg on 1/24/2018  7:51 AM   Next Dose Due:  Resume on discharge.                                 fluticasone 50 MCG/ACT spray   Commonly known as:  FLONASE   Spray 2 sprays into both nostrils daily   Last time this was given:  2 sprays on 1/25/2018 10:22 AM   Next Dose Due:  Resume on discharge.                                 hydrOXYzine 10 MG tablet   Commonly known as:  ATARAX   Take 1 tablet (10 mg) by mouth every 6 hours as needed for itching   Next Dose Due:  1/25/18                                   lidocaine 5 % Patch   Commonly known as:  LIDODERM   Place 1 patch onto the skin daily as needed for moderate pain   Next Dose Due:  Resume on discharge.                                 LORATADINE PO   Take 10 mg by mouth daily   Last time this was given:  10 mg on 1/25/2018  8:16 AM   Next Dose Due:  1/26/18                                   MUCINEX PO   Take 1,200 mg by mouth daily   Next Dose Due:  Resume on discharge.                                 NARCOSOFT HERBAL LAX PO   Take 1-2 capsules by mouth daily (HERB-LAX)   Next Dose Due:  Resume on discharge.                                 ondansetron 4 MG ODT tab   Commonly known as:  ZOFRAN-ODT   Take 1 tablet (4 mg) by mouth every 6 hours as needed for nausea or vomiting   Next Dose Due:  1/25/18                                   order for DME   Equipment being ordered: rt short cam walker                                order for DME   Equipment being ordered: Walker Wheels () and Walker () Treatment Diagnosis: difficulty with gait                                oxyCODONE IR 5 MG tablet   Commonly known as:  ROXICODONE   Take 1-2 tablets (5-10 mg) by mouth every 3 hours as needed for moderate to severe pain   Last time this was given:  10 mg on 1/25/2018  4:23 PM   Next Dose Due:  1/25/18                                   PREDNISONE PO   Take 20 mg by mouth daily as needed (to clear lungs)   Next Dose Due:  Resume  on discharge.                                 prenatal multivitamin plus iron 27-0.8 MG Tabs per tablet   Take 1 tablet by mouth daily   Last time this was given:  1 tablet on 1/24/2018 11:44 AM   Next Dose Due:  Resume on discharge.                                 PRESERVISION/LUTEIN PO   Take 1 tablet by mouth 2 times daily   Next Dose Due:  Resume on discharge.                                 senna-docusate 8.6-50 MG per tablet   Commonly known as:  SENOKOT-S;PERICOLACE   Take 1-2 tablets by mouth 2 times daily   Last time this was given:  2 tablets on 1/25/2018  8:16 AM   Next Dose Due:  1/25/18                                   SINGULAIR PO   Take 10 mg by mouth daily   Last time this was given:  10 mg on 1/25/2018  8:15 AM   Next Dose Due:  1/26/18                                   SUDAFED PO   Take 120 mg by mouth 2 times daily as needed   Next Dose Due:  Resume on discharge.                                 TYLENOL PO   Take 650 mg by mouth 4 times daily as needed for mild pain or fever   Last time this was given:  975 mg on 1/25/2018 10:23 AM   Next Dose Due:  Resume on discharge.                                 VITAMIN D (CHOLECALCIFEROL) PO   Take 2,000 Units by mouth three times a week   Next Dose Due:  Resume on discharge.

## 2018-01-23 NOTE — ANESTHESIA CARE TRANSFER NOTE
Patient: Melissa Templeton    Procedure(s):  RIGHT TOTAL KNEE ARTHROPLASTY    EBL: 5mL - Wound Class: I-Clean    Diagnosis: BONE ON BONE RIGHT KNEE  Diagnosis Additional Information: No value filed.    Anesthesia Type:   Periph. Nerve Block for postop pain, Spinal     Note:  Airway :Face Mask  Patient transferred to:PACU  Comments: Pt exhibits spont resps, all monitors and alarms on in pacu, report given to RN, vss.Handoff Report: Identifed the Patient, Identified the Reponsible Provider, Reviewed the pertinent medical history, Discussed the surgical course, Reviewed Intra-OP anesthesia mangement and issues during anesthesia, Set expectations for post-procedure period and Allowed opportunity for questions and acknowledgement of understanding      Vitals: (Last set prior to Anesthesia Care Transfer)    CRNA VITALS  1/23/2018 0942 - 1/23/2018 1019      1/23/2018             NIBP: 118/83    NIBP Mean: 94    Resp Rate (set): 10                Electronically Signed By: INDIA Henriquez CRNA  January 23, 2018  10:19 AM

## 2018-01-23 NOTE — ANESTHESIA POSTPROCEDURE EVALUATION
Patient: Melissa Templeton    Procedure(s):  RIGHT TOTAL KNEE ARTHROPLASTY    EBL: 5mL - Wound Class: I-Clean    Diagnosis:BONE ON BONE RIGHT KNEE  Diagnosis Additional Information: No value filed.    Anesthesia Type:  Periph. Nerve Block for postop pain, Spinal    Note:  Anesthesia Post Evaluation    Patient location during evaluation: PACU  Patient participation: Able to fully participate in evaluation  Level of consciousness: awake  Pain management: adequate  Cardiovascular status: acceptable  Respiratory status: acceptable  Hydration status: acceptable  PONV: none     Anesthetic complications: None          Last vitals:  Vitals:    01/23/18 1330 01/23/18 1430 01/23/18 1530   BP: 130/77 130/58 139/84   Pulse: 83 92    Resp: 18 18 18   Temp:      SpO2: 94% 93% 93%         Electronically Signed By: BEE ESTRADA  January 23, 2018  4:37 PM

## 2018-01-23 NOTE — OP NOTE
Procedure Date: 01/23/2018      PREOPERATIVE DIAGNOSIS:  Osteoarthritis of the right knee.      POSTOPERATIVE DIAGNOSIS:  Osteoarthritis of the right knee.      PROCEDURE:  Right total knee arthroplasty using the DePuy Attune knee system, posterior stabilized rotating platform, #4 femur, #4 tibia, 6 mm tibial polyethylene and a 35 mm patellar button.      INDICATIONS FOR PROCEDURE:  The patient is a 67-year-old female with a long history of osteoarthritis of the right knee and valgus deformity of the right knee.  She has been getting injection treatments with good short-term relief, however, these have become decreasingly effective.  She has difficulty with activities of daily living.  I discussed treatment options with her.  I explained to her the risks, benefits, potential complications of total knee arthroplasty.  This discussion included, but was not specific to infection, vascular and neurologic complications, DVT, septic and aseptic loosening, aponeurosis and possible need for further revision surgery.  After this discussion, the patient wanted to proceed with surgery.      ANESTHESIA:  General.      ASSISTANT:  Gareth Garcia PA-C       PROCEDURE DETAILS:  The patient was taken to the operating room and placed on the operating table in the supine position.  After adequate induction of a general anesthetic, a pneumatic tourniquet was applied to the right thigh.  A bump was placed underneath the right hip.  The patient was given 2 grams of Ancef for infection prophylaxis, given 1 hour prior to incision.  We then performed a sterile prep and drape of the right knee and right lower extremity.  After sterile prep and drape, the limb was exsanguinated, tourniquet was elevated to 300 mmHg.  I then made a midline incision exposing the extensor mechanism, proceeded with a medial parapatellar arthrotomy in a quad splitting approach.  I then identified the entry point for the intramedullary guide of the femur, set it at 5  degrees of valgus and made my distal femoral cut.  We then sized the femur appropriately and applied the jig for 5 degrees of external rotation of the femoral component.  We then compared this with the transepicondylar axis to ensure that we had at least neutral and slight external rotation of the femoral component.  We then made our anterior and posterior cuts along with anterior and posterior chamfers.  We then directed our attention to the tibia, and used the extramedullary guide to establish a neutral cut of the tibia based off the deficient lateral side.  Once the tibial cut was made, we checked our flexion, extension gaps and found them to be equal.  We then finished preparation of the femur by making a box cut and finished the preparation of the tibia.  We then sized the patella and made our patellar cuts and finished preparation of the patella.        While the cement was being mixed, we began preparing the cement surfaces using pulsatile lavage with antibiotic saline.  Once the cement was of appropriate consistency, we cemented first the tibial component, followed by the femoral components and once these components were fully seated, excess cement was removed using [place] elevator.  We then placed the knee in full extension with trial polyethylene in place and allowed the cement to harden.  At this point, we cemented the patella and again removed any excess cement using [place] elevator.  While the cement was hardening, we soaked the knee in a dilute solution of Betadine for 3 minutes and irrigated using pulse jet lavage and used approximately 3 liters of antibiotic saline in the pulsatile lavage.  Once the cement had hardened, we took the knee through a range of motion.  Patella tracked ideally, we had good soft tissue balancing in flexion and extension.  We then removed the trial polyethylene and inspected the joint for loose bone and cement and removed what was found.  We then irrigated using pulse jet  lavage, put in the actual rotating platform, tibial polyethylene, reduced the knee and again checked for patellar tracking and found it to be ideal.  We then placed the knee in approximately 30 degrees of flexion and put in a medium Hemovac drain deep to the fascia and closed the arthrotomy using 0 Ethibond sutures.  This was oversewn with Stratafix.  We then closed the deep subcutaneous using 0 Vicryl, superficial subcutaneous was closed with 2-0 Vicryl, skin was closed with running 3-0 Monocryl, followed by a Prineo dressing.  Sterile dressing was applied followed by a knee immobilizer.  The patient tolerated the operative procedure.  There were no intraoperative complications.  The patient went to [facility], in stable condition.  Plan will be for the patient to get 24 hours of Ancef for infection prophylaxis, 30 days of aspirin for DVT prophylaxis.         BREEZY VELÁZQUEZ MD             D: 2018   T: 2018   MT: EVE      Name:     LATONIA MARSH   MRN:      [id number]        Account:        [id number]   :      1950           Procedure Date: 2018      Document: [id number]

## 2018-01-23 NOTE — PLAN OF CARE
Problem: Knee Arthroplasty (Total, Partial) (Adult)  Goal: Signs and Symptoms of Listed Potential Problems Will be Absent, Minimized or Managed (Knee Arthroplasty)  Signs and symptoms of listed potential problems will be absent, minimized or managed by discharge/transition of care (reference Knee Arthroplasty (Total, Partial) (Adult) CPG).   Outcome: No Change  Pt dangled and stood with PT.  Knee drsg dry and intact.  Concepcion and HVC patent.  Sl knee pain but upon arrival from pacu pt rated chronic back pain #4.  CMS back to normal.  Denies nausea.

## 2018-01-23 NOTE — IP AVS SNAPSHOT
[address] [facility]    [address] [place] [address]    Phone:  [phone]                                       After Visit Summary   1/23/2018    Melissa Templeton    MRN: [id number]           After Visit Summary Signature Page     I have received my discharge instructions, and my questions have been answered. I have discussed any challenges I see with this plan with the nurse or doctor.    ..........................................................................................................................................  Patient/Patient Representative Signature      ..........................................................................................................................................  Patient Representative Print Name and Relationship to Patient    ..................................................               ................................................  Date                                            Time    ..........................................................................................................................................  Reviewed by Signature/Title    ...................................................              ..............................................  Date                                                            Time

## 2018-01-23 NOTE — ANESTHESIA PROCEDURE NOTES
Peripheral nerve/Neuraxial procedure note : intrathecal  Pre-Procedure  Performed by SUSANA TORIBIO  Location: OR      Pre-Anesthestic Checklist: patient identified, IV checked, risks and benefits discussed, informed consent, monitors and equipment checked and pre-op evaluation    Timeout  Correct Patient: Yes   Correct Procedure: Yes   Correct Site: Yes   Correct Laterality: N/A   Correct Position: Yes   Site Marked: N/A   .   Procedure Documentation    .    Procedure:    Intrathecal.  Insertion Site:L4-5  (midline approach)      Patient Prep;povidone-iodine 7.5% surgical scrub.  .  Needle: Jami tip Spinal Needle (gauge): 20  Spinal/LP Needle Length (inches): 3.5 # of attempts: 1 and # of redirects:  Introducer used Introducer: 20 G .       Assessment/Narrative  Paresthesias: Yes and Resolved.  .  .  clear CSF fluid removed . Comments:  1% lidocaine for localization.  No complications.

## 2018-01-23 NOTE — BRIEF OP NOTE
[facility] Brief Operative Note    Pre-operative diagnosis: BONE ON BONE RIGHT KNEE   Post-operative diagnosis * No post-op diagnosis entered *  Right knee osteoarthritis   Procedure: Procedure(s):  RIGHT TOTAL KNEE ARTHROPLASTY    EBL: 5mL - Wound Class: I-Clean   Surgeon(s): Surgeon(s) and Role:     * Ambar Wilson MD - Primary     * Vito Garcia PA-C - Assisting   Estimated blood loss: 5 mL    Specimens: * No specimens in log *   Findings: Right knee osteoarthritis

## 2018-01-23 NOTE — ANESTHESIA PREPROCEDURE EVALUATION
Anesthesia Evaluation     .             ROS/MED HX    ENT/Pulmonary:     (+)sleep apnea, allergic rhinitis, tobacco use, Past use Moderate Persistent asthma uses CPAP , . .    Neurologic:       Cardiovascular:     (+) Dyslipidemia, ----. : . . . :. .       METS/Exercise Tolerance:     Hematologic:         Musculoskeletal:   (+) arthritis, , , -       GI/Hepatic:        (-) GERD   Renal/Genitourinary:         Endo:     (+) Obesity, .      Psychiatric:         Infectious Disease:         Malignancy:         Other: Comment: Blind right eye;                    Physical Exam  Normal systems: cardiovascular, pulmonary and dental    Airway   Mallampati: II  TM distance: >3 FB  Neck ROM: full    Dental     Cardiovascular   Rhythm and rate: regular      Pulmonary    breath sounds clear to auscultation                    Anesthesia Plan      History & Physical Review  History and physical reviewed and following examination; no interval change.    ASA Status:  2 .    NPO Status:  > 8 hours    Plan for Periph. Nerve Block for postop pain and Spinal   PONV prophylaxis:  Ondansetron (or other 5HT-3) and Dexamethasone or Solumedrol       Postoperative Care  Postoperative pain management:  Peripheral nerve block (Single Shot) and IV analgesics.      Consents  Anesthetic plan, risks, benefits and alternatives discussed with:  Patient..                          .

## 2018-01-23 NOTE — ANESTHESIA PROCEDURE NOTES
Peripheral nerve/Neuraxial procedure note : femoral and Adductor canal  Pre-Procedure  Performed by SUSANA TORIBIO  Location: pre-op      Pre-Anesthestic Checklist: patient identified, IV checked, site marked, risks and benefits discussed, informed consent, monitors and equipment checked, at physician/surgeon's request and post-op pain management    Timeout  Correct Patient: Yes   Correct Procedure: Yes   Correct Site: Yes   Correct Laterality: Yes   Correct Position: Yes   Site Marked: Yes   .   Procedure Documentation    .    Procedure:  right  Femoral and Adductor canal.  Local skin infiltrated with 3 mL of 1% lidocaine.     Ultrasound used to identify targeted nerve, plexus, or vascular marker and placed a needle adjacent to it., Ultrasound was used to visualize the spread of the anesthetic in close proximity to the above stated nerve. A permanent image is entered into the patient's record.  Patient Prep;mask, sterile gloves, chlorhexidine gluconate and isopropyl alcohol, patient draped.  .  Needle: insulated, short bevel Needle Gauge: 20.    Needle Length (Inches) 3.13  Insertion Method: Single Shot.       Assessment/Narrative  Paresthesias: No.  .  The placement was negative for: blood aspirated, painful injection and site bleeding.  Bolus given via needle..   Secured via.   Complications: none. Comments:  Single shot femoral nerve block via adductor canal;  25 ml 0.5% Ropivacaine with 1:400,000 Epinephrine

## 2018-01-23 NOTE — PROGRESS NOTES
Admission medication history interview status for the 1/23/2018  admission is complete. See EPIC admission navigator for prior to admission medications     Medication history source reliability:Good    Medication history interview source(s):Patient    Medication history resources (including written lists, pill bottles, clinic record):None    Primary pharmacy.[facility]    Additional medication history information not noted on PTA med list :None    Time spent in this activity: 45 minutes    Prior to Admission medications    Medication Sig Last Dose Taking? Auth Provider   diclofenac (VOLTAREN) 1 % GEL topical gel Place onto the skin 3 times daily as needed for moderate pain 1/21/2018 at prn Yes Reported, Patient   GLUCOSAMINE-CHONDROITIN PO Take 1 tablet by mouth 2 times daily 1/22/2018 at am Yes Reported, Patient   LORATADINE PO Take 10 mg by mouth daily 1/22/2018 at am Yes Reported, Patient   lidocaine (LIDODERM) 5 % Patch Place 1 patch onto the skin daily as needed for moderate pain more than a week at prn Yes Reported, Patient   Acetaminophen (TYLENOL PO) Take 650 mg by mouth 4 times daily as needed for mild pain or fever 1/22/2018 at prn Yes Reported, Patient   ASPIRIN PO Take 81 mg by mouth every evening more than a week Yes Reported, Patient   MELOXICAM PO Take 15 mg by mouth daily as needed  more than a week at prn Yes Reported, Patient   Montelukast Sodium (SINGULAIR PO) Take 10 mg by mouth daily 1/22/2018 at am Yes Reported, Patient   Pseudoephedrine HCl (SUDAFED PO) Take 120 mg by mouth 2 times daily as needed  1/21/2018 Yes Reported, Patient   Omega-3 Fatty Acids (FISH OIL PO) Take 1,200 mg by mouth daily more than a week Yes Reported, Patient   Multiple Vitamins-Minerals (PRESERVISION/LUTEIN PO) Take 1 tablet by mouth 2 times daily 1/22/2018 at am Yes Reported, Patient   Calcium-Magnesium-Vitamin D (CITRACAL SLOW RELEASE) 600- MG-MG-UNIT TB24 Take 1 tablet by mouth 2 times daily 1/22/2018 at  am Yes Reported, Patient   GuaiFENesin (MUCINEX PO) Take 1,200 mg by mouth daily 1/22/2018 at am Yes Reported, Patient   PREDNISONE PO Take 20 mg by mouth daily as needed (to clear lungs) more than a week at prn Yes Reported, Patient   Prenatal Vit-Fe Fumarate-FA (PRENATAL MULTIVITAMIN PLUS IRON) 27-0.8 MG TABS per tablet Take 1 tablet by mouth daily 1/18/2018 Yes Reported, Patient   fluticasone (FLONASE) 50 MCG/ACT spray Spray 2 sprays into both nostrils daily 1/22/2018 at am Yes Reported, Patient   Fluticasone Furoate-Vilanterol (BREO ELLIPTA IN) Inhale into the lungs daily  1/22/2018 at am Yes Reported, Patient   albuterol (PROAIR HFA/PROVENTIL HFA/VENTOLIN HFA) 108 (90 BASE) MCG/ACT Inhaler Inhale 2 puffs into the lungs every 6 hours as needed for shortness of breath / dyspnea or wheezing more than a week at prn Yes Reported, Patient   Misc Natural Products (NARCOSOFT HERBAL LAX PO) Take 1-2 capsules by mouth daily (HERB-LAX) more than a week Yes Reported, Patient   FIBER ADULT GUMMIES PO Take 2 chew tab by mouth daily (FIBERWELL) 1/21/2018 Yes Reported, Patient   VITAMIN D, CHOLECALCIFEROL, PO Take 2,000 Units by mouth three times a week 1/16/2018 Yes Reported, Patient   ORDER FOR DME Equipment being ordered: rt Dwayne Guzman DO

## 2018-01-23 NOTE — PLAN OF CARE
Problem: Patient Care Overview  Goal: Plan of Care/Patient Progress Review  PT: Orders received, chart reviewed, eval completed and treatment initiated. Pt is a 68 y/o female POD 0 R TKA, WBAT, KI until IND with SLR; no known complications at time of evaluation. At baseline pt lives with her spouse in a house with 3 stairs/no rail to enter, 13 stairs/L rail when descending to basement; bedroom and bathroom with tub/shower combo on main level, walk in shower in basement. Pt does not use AD/AE at baseline and is IND with all daily activities including driving.    Discharge Planner PT   Patient plan for discharge: home with assist from , OP PT  Current status: Pt with good quad control, demonstrates IND with SLR, KI not used during session, no knee buckling demonstrated. Good tolerance with TKA ex, completes supine<>sit with CGA, sit<>stand with CGA x2 reps, gait at EOB with FWW and KI. Pt stable on RA throughout session.  Barriers to return to prior living situation: stairs not yet trialed, post op pain, current level of assist  Recommendations for discharge: anticipate home with assist and OP PT  Rationale for recommendations: pt with good tolerance, pain tolerable/controlled, motivated to participate and limited physical assist required to complete functional mobility during initial session; benefits from OP PT to continue to progress strength and ROM postop       Entered by: Ning Lewis 01/23/2018 3:53 PM

## 2018-01-24 ENCOUNTER — APPOINTMENT (OUTPATIENT)
Dept: PHYSICAL THERAPY | Facility: CLINIC | Age: 68
DRG: 470 | End: 2018-01-24
Attending: ORTHOPAEDIC SURGERY
Payer: COMMERCIAL

## 2018-01-24 LAB
CREAT SERPL-MCNC: 0.7 MG/DL (ref 0.52–1.04)
GFR SERPL CREATININE-BSD FRML MDRD: 84 ML/MIN/1.7M2
GLUCOSE SERPL-MCNC: 116 MG/DL (ref 70–99)
HGB BLD-MCNC: 11.7 G/DL (ref 11.7–15.7)

## 2018-01-24 PROCEDURE — 97110 THERAPEUTIC EXERCISES: CPT | Mod: GP | Performed by: PHYSICAL THERAPIST

## 2018-01-24 PROCEDURE — 97116 GAIT TRAINING THERAPY: CPT | Mod: GP | Performed by: PHYSICAL THERAPIST

## 2018-01-24 PROCEDURE — 25000132 ZZH RX MED GY IP 250 OP 250 PS 637: Mod: GY | Performed by: PHYSICIAN ASSISTANT

## 2018-01-24 PROCEDURE — 82947 ASSAY GLUCOSE BLOOD QUANT: CPT | Performed by: ORTHOPAEDIC SURGERY

## 2018-01-24 PROCEDURE — 82565 ASSAY OF CREATININE: CPT | Performed by: ORTHOPAEDIC SURGERY

## 2018-01-24 PROCEDURE — 25000132 ZZH RX MED GY IP 250 OP 250 PS 637: Mod: GY | Performed by: ORTHOPAEDIC SURGERY

## 2018-01-24 PROCEDURE — A9270 NON-COVERED ITEM OR SERVICE: HCPCS | Mod: GY | Performed by: PHYSICIAN ASSISTANT

## 2018-01-24 PROCEDURE — 12000007 ZZH R&B INTERMEDIATE

## 2018-01-24 PROCEDURE — 40000193 ZZH STATISTIC PT WARD VISIT: Performed by: PHYSICAL THERAPIST

## 2018-01-24 PROCEDURE — 25000128 H RX IP 250 OP 636: Performed by: ORTHOPAEDIC SURGERY

## 2018-01-24 PROCEDURE — 85018 HEMOGLOBIN: CPT | Performed by: ORTHOPAEDIC SURGERY

## 2018-01-24 PROCEDURE — 36415 COLL VENOUS BLD VENIPUNCTURE: CPT | Performed by: ORTHOPAEDIC SURGERY

## 2018-01-24 PROCEDURE — A9270 NON-COVERED ITEM OR SERVICE: HCPCS | Mod: GY | Performed by: ORTHOPAEDIC SURGERY

## 2018-01-24 RX ORDER — PRENATAL VIT/IRON FUM/FOLIC AC 27MG-0.8MG
1 TABLET ORAL DAILY
Status: DISCONTINUED | OUTPATIENT
Start: 2018-01-24 | End: 2018-01-25 | Stop reason: HOSPADM

## 2018-01-24 RX ORDER — OXYCODONE HYDROCHLORIDE 5 MG/1
5-10 TABLET ORAL
Status: DISCONTINUED | OUTPATIENT
Start: 2018-01-24 | End: 2018-01-25 | Stop reason: HOSPADM

## 2018-01-24 RX ORDER — CELECOXIB 200 MG/1
200 CAPSULE ORAL 2 TIMES DAILY
Status: DISCONTINUED | OUTPATIENT
Start: 2018-01-24 | End: 2018-01-25 | Stop reason: HOSPADM

## 2018-01-24 RX ADMIN — LORATADINE 10 MG: 10 TABLET ORAL at 07:51

## 2018-01-24 RX ADMIN — HYDROMORPHONE HYDROCHLORIDE 0.5 MG: 1 INJECTION, SOLUTION INTRAMUSCULAR; INTRAVENOUS; SUBCUTANEOUS at 02:14

## 2018-01-24 RX ADMIN — FLUTICASONE PROPIONATE 2 SPRAY: 50 SPRAY, METERED NASAL at 11:47

## 2018-01-24 RX ADMIN — ASPIRIN 325 MG: 325 TABLET, DELAYED RELEASE ORAL at 07:50

## 2018-01-24 RX ADMIN — ACETAMINOPHEN 975 MG: 325 TABLET, FILM COATED ORAL at 17:53

## 2018-01-24 RX ADMIN — OXYCODONE HYDROCHLORIDE 10 MG: 5 TABLET ORAL at 08:41

## 2018-01-24 RX ADMIN — OXYCODONE HYDROCHLORIDE 10 MG: 5 TABLET ORAL at 14:51

## 2018-01-24 RX ADMIN — CELECOXIB 200 MG: 200 CAPSULE ORAL at 21:02

## 2018-01-24 RX ADMIN — OXYCODONE HYDROCHLORIDE 10 MG: 5 TABLET ORAL at 11:45

## 2018-01-24 RX ADMIN — PRENATAL VIT W/ FE FUMARATE-FA TAB 27-0.8 MG 1 TABLET: 27-0.8 TAB at 11:44

## 2018-01-24 RX ADMIN — MAGNESIUM OXIDE TAB 400 MG (241.3 MG ELEMENTAL MG) 400 MG: 400 (241.3 MG) TAB at 08:41

## 2018-01-24 RX ADMIN — KETOROLAC TROMETHAMINE 15 MG: 15 INJECTION, SOLUTION INTRAMUSCULAR; INTRAVENOUS at 07:47

## 2018-01-24 RX ADMIN — SODIUM CHLORIDE, POTASSIUM CHLORIDE, SODIUM LACTATE AND CALCIUM CHLORIDE: 600; 310; 30; 20 INJECTION, SOLUTION INTRAVENOUS at 04:21

## 2018-01-24 RX ADMIN — SENNOSIDES AND DOCUSATE SODIUM 2 TABLET: 8.6; 5 TABLET ORAL at 21:02

## 2018-01-24 RX ADMIN — CELECOXIB 200 MG: 200 CAPSULE ORAL at 14:20

## 2018-01-24 RX ADMIN — OYSTER SHELL CALCIUM WITH VITAMIN D 1 TABLET: 500; 200 TABLET, FILM COATED ORAL at 17:51

## 2018-01-24 RX ADMIN — ACETAMINOPHEN 975 MG: 325 TABLET, FILM COATED ORAL at 11:44

## 2018-01-24 RX ADMIN — Medication 1000 MG: at 07:51

## 2018-01-24 RX ADMIN — MONTELUKAST SODIUM 10 MG: 10 TABLET, FILM COATED ORAL at 07:50

## 2018-01-24 RX ADMIN — OXYCODONE HYDROCHLORIDE 10 MG: 5 TABLET ORAL at 17:51

## 2018-01-24 RX ADMIN — OXYCODONE HYDROCHLORIDE 5 MG: 5 TABLET ORAL at 00:17

## 2018-01-24 RX ADMIN — ACETAMINOPHEN 975 MG: 325 TABLET, FILM COATED ORAL at 02:09

## 2018-01-24 RX ADMIN — OYSTER SHELL CALCIUM WITH VITAMIN D 1 TABLET: 500; 200 TABLET, FILM COATED ORAL at 08:41

## 2018-01-24 RX ADMIN — ASPIRIN 325 MG: 325 TABLET, DELAYED RELEASE ORAL at 21:01

## 2018-01-24 RX ADMIN — SENNOSIDES AND DOCUSATE SODIUM 1 TABLET: 8.6; 5 TABLET ORAL at 07:50

## 2018-01-24 RX ADMIN — OXYCODONE HYDROCHLORIDE 10 MG: 5 TABLET ORAL at 21:04

## 2018-01-24 RX ADMIN — CEFAZOLIN SODIUM 2 G: 2 SOLUTION INTRAVENOUS at 00:17

## 2018-01-24 RX ADMIN — OXYCODONE HYDROCHLORIDE 10 MG: 5 TABLET ORAL at 04:29

## 2018-01-24 NOTE — PROGRESS NOTES
Melissa Templeton  2018  POD # 1 sp tka    Admit Date:  2018      Doing well.  Normal healing wound.  No immediate surgical complications identified.  No excessive bleeding  Pain well-controlled.  Tolerating physical therapy and rehabilitation well.  Objective:  Blood pressure 113/63, pulse 62, temperature 97.5  F (36.4  C), temperature source Axillary, resp. rate 16, height 1.524 m (5'), weight 82.1 kg (180 lb 14.4 oz), SpO2 95 %.    Temperatures:  Current - Temp: 97.5  F (36.4  C); Max - Temp  Av.5  F (36.4  C)  Min: 96.9  F (36.1  C)  Max: 98.1  F (36.7  C)  Pulse range: Pulse  Av.3  Min: 47  Max: 92  Blood pressure range: Systolic (24hrs), Av , Min:91 , Max:148   ; Diastolic (24hrs), Av, Min:44, Max:88    Exam:  CMS: intact  alert, stable, wound ok  Calf nontender b le.       Labs:  No results for input(s): POTASSIUM in the last 63874 hours.  Recent Labs   Lab Test  18   0715   HGB  11.7     No results for input(s): INR in the last 18851 hours.  No results for input(s): PLT in the last 44135 hours.    PLAN: Weight bearing as tolerated  Continue physical therapy  Pain control measures  Cont post-op routine.

## 2018-01-24 NOTE — PLAN OF CARE
Problem: Knee Arthroplasty (Total, Partial) (Adult)  Goal: Signs and Symptoms of Listed Potential Problems Will be Absent, Minimized or Managed (Knee Arthroplasty)  Signs and symptoms of listed potential problems will be absent, minimized or managed by discharge/transition of care (reference Knee Arthroplasty (Total, Partial) (Adult) CPG).   Outcome: Improving  A&O X4. CMS intact. Drg CDI. hemovac removed. Pain is being managed with po pain meds. A-1 with walker. Eating, drinking, and voiding adequately. VSS on RA while awake, 2L with cpap while alseep. Progressing per POC.

## 2018-01-24 NOTE — PLAN OF CARE
Problem: Patient Care Overview  Goal: Plan of Care/Patient Progress Review  A&O. VSS on 2L O2. Home CPAP set up for bedtime. Repositioned in bed. Pain managed with tylenol, toradol and IV dilaudid x1. CMS intact. Dressing CDI.  Concepcion with AUO. Hemovac intact. IVF infusing.

## 2018-01-24 NOTE — PLAN OF CARE
Problem: Patient Care Overview  Goal: Plan of Care/Patient Progress Review    Discharge Planner PT   Patient plan for discharge: home with assist from , OP PT  Current status: Supine > sit with SBA and cues for sequencing and to not pull on PT; sit <> stand with CGA, amb x50' with FWW and CGA, KI donned d/t decreased quad control this session, ROM: 0-9-71  Barriers to return to prior living situation: stairs not yet trialed, post op pain, current level of assist  Recommendations for discharge: anticipate home with assist and OP PT  Rationale for recommendations: pt with good tolerance, pain tolerable/controlled, motivated to participate and limited physical assist required to complete functional mobility during initial session; benefits from OP PT to continue to progress strength and ROM postop       Entered by: Che Arredondo 01/24/2018 11:56 AM

## 2018-01-24 NOTE — PLAN OF CARE
Problem: Patient Care Overview  Goal: Plan of Care/Patient Progress Review  Outcome: Improving  Pt is AAOx4, pain is controlled with pharm interventions. Concepcion dc. Hemovac minimal  Serosanguinous drainage. Will continue to monitor.

## 2018-01-25 ENCOUNTER — APPOINTMENT (OUTPATIENT)
Dept: PHYSICAL THERAPY | Facility: CLINIC | Age: 68
DRG: 470 | End: 2018-01-25
Attending: ORTHOPAEDIC SURGERY
Payer: COMMERCIAL

## 2018-01-25 ENCOUNTER — APPOINTMENT (OUTPATIENT)
Dept: OCCUPATIONAL THERAPY | Facility: CLINIC | Age: 68
DRG: 470 | End: 2018-01-25
Attending: ORTHOPAEDIC SURGERY
Payer: COMMERCIAL

## 2018-01-25 VITALS
HEART RATE: 93 BPM | BODY MASS INDEX: 35.51 KG/M2 | DIASTOLIC BLOOD PRESSURE: 73 MMHG | TEMPERATURE: 98.2 F | HEIGHT: 60 IN | SYSTOLIC BLOOD PRESSURE: 126 MMHG | OXYGEN SATURATION: 91 % | WEIGHT: 180.9 LBS | RESPIRATION RATE: 16 BRPM

## 2018-01-25 LAB — HGB BLD-MCNC: 11.3 G/DL (ref 11.7–15.7)

## 2018-01-25 PROCEDURE — 85018 HEMOGLOBIN: CPT | Performed by: ORTHOPAEDIC SURGERY

## 2018-01-25 PROCEDURE — 97535 SELF CARE MNGMENT TRAINING: CPT | Mod: GO

## 2018-01-25 PROCEDURE — 40000133 ZZH STATISTIC OT WARD VISIT

## 2018-01-25 PROCEDURE — 40000193 ZZH STATISTIC PT WARD VISIT: Performed by: PHYSICAL THERAPY ASSISTANT

## 2018-01-25 PROCEDURE — 25000132 ZZH RX MED GY IP 250 OP 250 PS 637: Mod: GY | Performed by: ORTHOPAEDIC SURGERY

## 2018-01-25 PROCEDURE — 97165 OT EVAL LOW COMPLEX 30 MIN: CPT | Mod: GO

## 2018-01-25 PROCEDURE — A9270 NON-COVERED ITEM OR SERVICE: HCPCS | Mod: GY | Performed by: ORTHOPAEDIC SURGERY

## 2018-01-25 PROCEDURE — 97116 GAIT TRAINING THERAPY: CPT | Mod: GP | Performed by: PHYSICAL THERAPY ASSISTANT

## 2018-01-25 PROCEDURE — 36415 COLL VENOUS BLD VENIPUNCTURE: CPT | Performed by: ORTHOPAEDIC SURGERY

## 2018-01-25 PROCEDURE — 25000132 ZZH RX MED GY IP 250 OP 250 PS 637: Mod: GY | Performed by: PHYSICIAN ASSISTANT

## 2018-01-25 PROCEDURE — 97110 THERAPEUTIC EXERCISES: CPT | Mod: GP | Performed by: PHYSICAL THERAPY ASSISTANT

## 2018-01-25 PROCEDURE — A9270 NON-COVERED ITEM OR SERVICE: HCPCS | Mod: GY | Performed by: PHYSICIAN ASSISTANT

## 2018-01-25 RX ORDER — HYDROXYZINE HYDROCHLORIDE 10 MG/1
10 TABLET, FILM COATED ORAL EVERY 6 HOURS PRN
Qty: 40 TABLET | Refills: 0 | Status: ON HOLD | OUTPATIENT
Start: 2018-01-25 | End: 2020-01-07

## 2018-01-25 RX ORDER — AMOXICILLIN 250 MG
1-2 CAPSULE ORAL 2 TIMES DAILY
Qty: 40 TABLET | Refills: 0 | Status: SHIPPED | OUTPATIENT
Start: 2018-01-25 | End: 2020-01-16

## 2018-01-25 RX ORDER — OXYCODONE HYDROCHLORIDE 5 MG/1
5-10 TABLET ORAL
Qty: 30 TABLET | Refills: 0 | Status: ON HOLD | OUTPATIENT
Start: 2018-01-25 | End: 2019-06-14

## 2018-01-25 RX ORDER — ONDANSETRON 4 MG/1
4 TABLET, ORALLY DISINTEGRATING ORAL EVERY 6 HOURS PRN
Qty: 40 TABLET | Refills: 0 | Status: ON HOLD | OUTPATIENT
Start: 2018-01-25 | End: 2020-01-07

## 2018-01-25 RX ORDER — ASPIRIN 325 MG
325 TABLET, DELAYED RELEASE (ENTERIC COATED) ORAL 2 TIMES DAILY
Qty: 60 TABLET | Refills: 0 | Status: SHIPPED | OUTPATIENT
Start: 2018-01-25 | End: 2019-06-12

## 2018-01-25 RX ADMIN — ACETAMINOPHEN 975 MG: 325 TABLET, FILM COATED ORAL at 10:23

## 2018-01-25 RX ADMIN — OXYCODONE HYDROCHLORIDE 5 MG: 5 TABLET ORAL at 06:01

## 2018-01-25 RX ADMIN — OXYCODONE HYDROCHLORIDE 10 MG: 5 TABLET ORAL at 10:23

## 2018-01-25 RX ADMIN — LORATADINE 10 MG: 10 TABLET ORAL at 08:16

## 2018-01-25 RX ADMIN — ASPIRIN 325 MG: 325 TABLET, DELAYED RELEASE ORAL at 08:15

## 2018-01-25 RX ADMIN — OXYCODONE HYDROCHLORIDE 5 MG: 5 TABLET ORAL at 13:26

## 2018-01-25 RX ADMIN — OXYCODONE HYDROCHLORIDE 10 MG: 5 TABLET ORAL at 16:23

## 2018-01-25 RX ADMIN — OXYCODONE HYDROCHLORIDE 10 MG: 5 TABLET ORAL at 00:56

## 2018-01-25 RX ADMIN — MONTELUKAST SODIUM 10 MG: 10 TABLET, FILM COATED ORAL at 08:15

## 2018-01-25 RX ADMIN — SENNOSIDES AND DOCUSATE SODIUM 2 TABLET: 8.6; 5 TABLET ORAL at 08:16

## 2018-01-25 RX ADMIN — FLUTICASONE PROPIONATE 2 SPRAY: 50 SPRAY, METERED NASAL at 10:22

## 2018-01-25 RX ADMIN — CELECOXIB 200 MG: 200 CAPSULE ORAL at 08:16

## 2018-01-25 ASSESSMENT — ACTIVITIES OF DAILY LIVING (ADL): PREVIOUS_RESPONSIBILITIES: MEAL PREP;HOUSEKEEPING;LAUNDRY;SHOPPING;FINANCES;MEDICATION MANAGEMENT;DRIVING;WORK

## 2018-01-25 NOTE — PROGRESS NOTES
01/25/18 1338   Quick Adds   Type of Visit Initial Occupational Therapy Evaluation   Living Environment   Lives With spouse   Living Arrangements house  (rambler )   Home Accessibility stairs to enter home;stairs within home;bed and bath on same level  (walk in shower and tub shower )   Transportation Available car;family or friend will provide   Living Environment Comment spouse available to assist as needed    Self-Care   Usual Activity Tolerance good   Current Activity Tolerance moderate   Regular Exercise no   Equipment Currently Used at Home none   Functional Level Prior   Ambulation 0-->independent   Transferring 0-->independent   Toileting 0-->independent   Bathing 0-->independent   Dressing 0-->independent   Fall history within last six months no   General Information   Onset of Illness/Injury or Date of Surgery - Date 01/23/18   Referring Physician Katie    Patient/Family Goals Statement Home today    Additional Occupational Profile Info/Pertinent History of Current Problem s/p tka   Cognitive Status Examination   Orientation orientation to person, place and time   Level of Consciousness alert   Visual Perception   Visual Perception Wears glasses   Visual Perception Comments legally blind in right eye per pt    Sensory Examination   Sensory Quick Adds No deficits were identified   Pain Assessment   Patient Currently in Pain Yes, see Vital Sign flowsheet   Mobility   Bed Mobility Bed mobility skill: Sit to supine;Bed mobility skill: Supine to sit   Bed Mobility Skill: Sit to Supine   Level of [place]: Sit/Supine contact guard   Bed Mobility Skill: Supine to Sit   Level of [place]: Supine/Sit contact guard   Transfer Skills   Transfer Transfer Safety Analysis Bed/Chair;Transfer Skill: Stand to Sit;Transfer Safety Analysis Sit/Stand   Transfer Skill: Bed to Chair/Chair to Bed   Level of [place]: Bed to Chair contact guard   Transfer Skill: Sit to Stand   Level of [place]: Sit/Stand  "contact guard   Toilet Transfer   Toilet Transfer Toilet Transfer Skill;Toilet Transfer Safety Analysis   Transfer Skill: Toilet Transfer   Level of [place]: Toilet contact guard   Lower Body Dressing   Level of [place]: Dress Lower Body minimum assist (75% patients effort)   Physical Assist/Nonphysical Assist: Dress Lower Body set-up required   Assistive Device sock-aid;reacher;long-handled shoe horn   Instrumental Activities of Daily Living (IADL)   Previous Responsibilities meal prep;housekeeping;laundry;shopping;finances;medication management;driving;work  (Works full time )   General Therapy Interventions   Planned Therapy Interventions ADL retraining;IADL retraining;transfer training   Clinical Impression   Criteria for Skilled Therapeutic Interventions Met yes, treatment indicated   OT Diagnosis Decreased ADLs and IADLs, functional transfers   Influenced by the following impairments pain    Assessment of Occupational Performance 1-3 Performance Deficits   Identified Performance Deficits Decreased ADLs and IADLs (dressing, bathing, toileting), functional transfers   Clinical Decision Making (Complexity) Low complexity   Therapy Frequency daily   Predicted Duration of Therapy Intervention (days/wks) 3 days   Anticipated Discharge Disposition Home with Assist   Risks and Benefits of Treatment have been explained. Yes   Patient, Family & other staff in agreement with plan of care Yes   [facility] AM-PAC TM \"6 Clicks\"   2016, Trustees of [facility], under license to [employer].  All rights reserved.   6 Clicks Short Forms Daily Activity Inpatient Short Form   [facility] AM-PAC  \"6 Clicks\" Daily Activity Inpatient Short Form   1. Putting on and taking off regular lower body clothing? 3 - A Little   2. Bathing (including washing, rinsing, drying)? 3 - A Little   3. Toileting, which includes using toilet, bedpan or urinal? 3 - A Little   4. Putting on and taking off regular upper body " clothing? 4 - None   5. Taking care of personal grooming such as brushing teeth? 3 - A Little   6. Eating meals? 4 - None   Daily Activity Raw Score (Score out of 24.Lower scores equate to lower levels of function) 20   Total Evaluation Time   Total Evaluation Time (Minutes) 8

## 2018-01-25 NOTE — PLAN OF CARE
Problem: Patient Care Overview  Goal: Plan of Care/Patient Progress Review  OT: Evaluation and treatment initiated. Pt lives in a home with her spouse. Prior pt independent in all I/ADLs. Pt is s/p R TKA, WBAT  Discharge Planner OT   Patient plan for discharge: Home     Current status:  Pt ambulated to the bathroom with CGA and transferred to the toilet with CGA. While seated/standing pt completed lower body dressing with AE with overall minimum assist. Pt reported plans to have spouse assist as needed. Educated on walker safety during transfers and functional tasks. Educated on shower chair transfer and options for a shower chair.     Barriers to return to prior living situation: tub/shower    Recommendations for discharge: Home with assist as needed for I/ADLs    Rationale for recommendations: Pt will have assist from spouse as needed        Entered by: Wilmer Fong 01/25/2018 2:28 PM

## 2018-01-25 NOTE — PLAN OF CARE
Problem: Patient Care Overview  Goal: Plan of Care/Patient Progress Review  Outcome: Improving  Pt is A & Ox4, VSS except slight elevation of B/P, on 2 L of oxygen with CPAP at [facility], afebrile. Up with assist of 1 using a walker and GB. CMS intact and drsg C/D/I. Voiding adequately. Pain well managed by celebrex, oxycodone and tylenol. Progressing well per POC.

## 2018-01-25 NOTE — PLAN OF CARE
Problem: Patient Care Overview  Goal: Plan of Care/Patient Progress Review    Discharge Planner PT   Patient plan for discharge: home with assist from , OP PT  Current status: Pt performed bed mobility with min assist and sit to/from stand transfers with SBA.  Gait training x 160 ft using wheeled walker and SBA.  Slow pace.  Performed 3 stairs x 1 using 1 rail and cane with CGA.  Knee AAROM is 15-73 degrees.  Barriers to return to prior living situation: None  Recommendations for discharge: Home with assist and OP PT per plan established by the PT.   Rationale for recommendations: Pt is progressing well.  Will have assist at home and will benefit from OP PT to continue to progress strength and ROM postop.       Entered by: Anahi Coulter 01/25/2018 4:33 PM     Pt is discharging home today with assist and OP PT.  PT goals partially met.

## 2018-01-25 NOTE — PROGRESS NOTES
VSS, CMS intact. Up with 1 and walker. Pain well controlled with oxycodone. Dressing changed, incision looks good. Reviewed AVS with patient. Discharge medications and instruction sheets given. Alert and orient x 4. Left floor at 1735 via wheelchair.

## 2018-01-25 NOTE — DISCHARGE SUMMARY
Orthopedic Discharge Summary   Patient: Melissa Templeton  Admission Date: 1/23/2018  Discharge Date: 1/25/18  Date of Service: 1/25/2018  Attending Provider: Ambar Wilson MD  Admission Diagnosis: BONE ON BONE RIGHT KNEE  Total knee replacement status  Discharge Diagnosis: right knee osteoarthritis  Procedures Performed: right total knee replacement  Complications: None apparent   History of Present Illness: see operative report for full HPI  Past Medical History:   Past Medical History:   Diagnosis Date     Allergic rhinitis, cause unspecified      Fluid overload      Hyperlipidemia      Hypoglycemia, unspecified      Osteoarthrosis, unspecified whether generalized or localized, unspecified site      Sleep apnea     cpap     Uncomplicated asthma      Unspecified cataract      Patient Active Problem List   Diagnosis     Pain in limb     Preop examination     Total knee replacement status     Past Surgical History:   Procedure Laterality Date     APPENDECTOMY       ARTHROPLASTY KNEE Right 1/23/2018    Procedure: ARTHROPLASTY KNEE;  RIGHT TOTAL KNEE ARTHROPLASTY    EBL: 5mL;  Surgeon: Ambar Wilson MD;  Location: [facility] OR     BACK SURGERY       C NONSPECIFIC PROCEDURE      Hysterectomy fibroids - bilat S&O     C NONSPECIFIC PROCEDURE  4-98    Discectomy L5-S1     C NONSPECIFIC PROCEDURE  1990    Cosmetic below eyes     EYE SURGERY       GYN SURGERY       ORTHOPEDIC SURGERY      dulce. rotator cuffs     Social History     Social History     Marital status:      Spouse name: N/A     Number of children: N/A     Years of education: N/A     Occupational History     Not on file.     Social History Main Topics     Smoking status: Former Smoker     Years: 10.00     Smokeless tobacco: Never Used     Alcohol use Yes      Comment: OCCASIONALLY--2/month     Drug use: Not on file     Sexual activity: Not on file     Other Topics Concern     Not on file     Social History Narrative     Medications on admission:    Prescriptions Prior to Admission   Medication Sig Dispense Refill Last Dose     diclofenac (VOLTAREN) 1 % GEL topical gel Place onto the skin 3 times daily as needed for moderate pain   1/21/2018 at prn     LORATADINE PO Take 10 mg by mouth daily   1/22/2018 at am     lidocaine (LIDODERM) 5 % Patch Place 1 patch onto the skin daily as needed for moderate pain   more than a week at prn     Acetaminophen (TYLENOL PO) Take 650 mg by mouth 4 times daily as needed for mild pain or fever   1/22/2018 at prn     Montelukast Sodium (SINGULAIR PO) Take 10 mg by mouth daily   1/22/2018 at am     Pseudoephedrine HCl (SUDAFED PO) Take 120 mg by mouth 2 times daily as needed    1/21/2018     Omega-3 Fatty Acids (FISH OIL PO) Take 1,200 mg by mouth daily   more than a week     Multiple Vitamins-Minerals (PRESERVISION/LUTEIN PO) Take 1 tablet by mouth 2 times daily   1/22/2018 at am     Calcium-Magnesium-Vitamin D (CITRACAL SLOW RELEASE) 600- MG-MG-UNIT TB24 Take 1 tablet by mouth 2 times daily   1/22/2018 at am     GuaiFENesin (MUCINEX PO) Take 1,200 mg by mouth daily   1/22/2018 at am     PREDNISONE PO Take 20 mg by mouth daily as needed (to clear lungs)   more than a week at prn     Prenatal Vit-Fe Fumarate-FA (PRENATAL MULTIVITAMIN PLUS IRON) 27-0.8 MG TABS per tablet Take 1 tablet by mouth daily   1/18/2018     fluticasone (FLONASE) 50 MCG/ACT spray Spray 2 sprays into both nostrils daily   1/22/2018 at am     Fluticasone Furoate-Vilanterol (BREO ELLIPTA IN) Inhale into the lungs daily    1/22/2018 at am     albuterol (PROAIR HFA/PROVENTIL HFA/VENTOLIN HFA) 108 (90 BASE) MCG/ACT Inhaler Inhale 2 puffs into the lungs every 6 hours as needed for shortness of breath / dyspnea or wheezing   more than a week at prn     Misc Natural Products (NARCOSOFT HERBAL LAX PO) Take 1-2 capsules by mouth daily (HERB-LAX)   more than a week     FIBER ADULT GUMMIES PO Take 2 chew tab by mouth daily (FIBERWELL)   1/21/2018     VITAMIN D,  CHOLECALCIFEROL, PO Take 2,000 Units by mouth three times a week   1/16/2018     [DISCONTINUED] GLUCOSAMINE-CHONDROITIN PO Take 1 tablet by mouth 2 times daily   1/22/2018 at am     [DISCONTINUED] ASPIRIN PO Take 81 mg by mouth every evening   more than a week     [DISCONTINUED] MELOXICAM PO Take 15 mg by mouth daily as needed    more than a week at prn     ORDER FOR DME Equipment being ordered: rt short cam walker 1 each 0      Allergies:    Allergies   Allergen Reactions     Magnesium Citrate Shortness Of Breath     Erythromycin      nausea     Hmg-Coa-R Inhibitors        Hospital Course: Patient was admitted to Orthopedics and brought to the OR where she underwent the above named procedure. Postoperatively she did very well with no apparent complications, and she had an uneventful hospital stay. Ancef was given for 24 hours after surgery. She was given aspirin for DVT prophylaxis and her pain was well controlled. She progressed well with physical therapy and discharge to home was recommended. Patient also took celebrex and aspirin together while in the hospital. Was told years ago by her Rheumatologist that she shouldn't take these together. Patient states she will call and verify with the doctor that it is okay for her to take celebrex and aspirin together. I told her we definitely need her to take aspirin to prevent blood clots. If her rheumatologist approves her to take both then that is fine with us from ortho standpoint.   2. Physical Therapy for gait training, mobilization, range of motion and strengthening exercises  3. Occupational Therapy for activities of daily living.  4. Social Work for disposition planning.  Active Problems:    Total knee replacement status    Discharge Disposition: patient doing well overall and discharge home today  Discharge Diet: resume normal pre operative diet  Discharge Medications:   Current Discharge Medication List      START taking these medications    Details   oxyCODONE  IR (ROXICODONE) 5 MG tablet Take 1-2 tablets (5-10 mg) by mouth every 3 hours as needed for moderate to severe pain  Qty: 30 tablet, Refills: 0    Associated Diagnoses: Status post total right knee replacement      hydrOXYzine (ATARAX) 10 MG tablet Take 1 tablet (10 mg) by mouth every 6 hours as needed for itching  Qty: 40 tablet, Refills: 0    Associated Diagnoses: Status post total right knee replacement      ondansetron (ZOFRAN-ODT) 4 MG ODT tab Take 1 tablet (4 mg) by mouth every 6 hours as needed for nausea or vomiting  Qty: 40 tablet, Refills: 0    Associated Diagnoses: Status post total right knee replacement      senna-docusate (SENOKOT-S;PERICOLACE) 8.6-50 MG per tablet Take 1-2 tablets by mouth 2 times daily  Qty: 40 tablet, Refills: 0    Associated Diagnoses: Status post total right knee replacement      !! order for DME Equipment being ordered: Walker Wheels () and Walker ()  Treatment Diagnosis: difficulty with gait  Qty: 1 each, Refills: 0    Associated Diagnoses: Status post total right knee replacement       !! - Potential duplicate medications found. Please discuss with provider.      CONTINUE these medications which have CHANGED    Details   aspirin  MG EC tablet Take 1 tablet (325 mg) by mouth 2 times daily  Qty: 60 tablet, Refills: 0    Associated Diagnoses: Status post total right knee replacement         CONTINUE these medications which have NOT CHANGED    Details   diclofenac (VOLTAREN) 1 % GEL topical gel Place onto the skin 3 times daily as needed for moderate pain      LORATADINE PO Take 10 mg by mouth daily      lidocaine (LIDODERM) 5 % Patch Place 1 patch onto the skin daily as needed for moderate pain      Acetaminophen (TYLENOL PO) Take 650 mg by mouth 4 times daily as needed for mild pain or fever      Montelukast Sodium (SINGULAIR PO) Take 10 mg by mouth daily      Pseudoephedrine HCl (SUDAFED PO) Take 120 mg by mouth 2 times daily as needed       Omega-3 Fatty Acids  (FISH OIL PO) Take 1,200 mg by mouth daily      Multiple Vitamins-Minerals (PRESERVISION/LUTEIN PO) Take 1 tablet by mouth 2 times daily      Calcium-Magnesium-Vitamin D (CITRACAL SLOW RELEASE) 600- MG-MG-UNIT TB24 Take 1 tablet by mouth 2 times daily      GuaiFENesin (MUCINEX PO) Take 1,200 mg by mouth daily      PREDNISONE PO Take 20 mg by mouth daily as needed (to clear lungs)      Prenatal Vit-Fe Fumarate-FA (PRENATAL MULTIVITAMIN PLUS IRON) 27-0.8 MG TABS per tablet Take 1 tablet by mouth daily      fluticasone (FLONASE) 50 MCG/ACT spray Spray 2 sprays into both nostrils daily      Fluticasone Furoate-Vilanterol (BREO ELLIPTA IN) Inhale into the lungs daily       albuterol (PROAIR HFA/PROVENTIL HFA/VENTOLIN HFA) 108 (90 BASE) MCG/ACT Inhaler Inhale 2 puffs into the lungs every 6 hours as needed for shortness of breath / dyspnea or wheezing      Misc Natural Products (NARCOSOFT HERBAL LAX PO) Take 1-2 capsules by mouth daily (HERB-LAX)      FIBER ADULT GUMMIES PO Take 2 chew tab by mouth daily (FIBERWELL)      VITAMIN D, CHOLECALCIFEROL, PO Take 2,000 Units by mouth three times a week      !! ORDER FOR DME Equipment being ordered: rt short cam walker  Qty: 1 each, Refills: 0    Associated Diagnoses: Metatarsal fracture       !! - Potential duplicate medications found. Please discuss with provider.      STOP taking these medications       GLUCOSAMINE-CHONDROITIN PO Comments:   Reason for Stopping:         MELOXICAM PO Comments:   Reason for Stopping:             Code Status: full code  Vito Garcia PA-C  1/25/2018 10:37 AM   BEV Nunez  [phone]

## 2018-01-25 NOTE — PLAN OF CARE
Problem: Patient Care Overview  Goal: Plan of Care/Patient Progress Review  Outcome: Improving  VSS, CMS intact. Up with 1 and walker. Good pain controlled with oxycodone Celebrex and tylenol. Alert and orient x 4.

## 2018-01-25 NOTE — PROGRESS NOTES
Melissa Templeton  2018  POD # 2 s/p RTKA  Admit Date:  2018      Doing well.  Objective: patient doing well overall. No chest pain or shortness of breath. Ready to work with therapy now and wants to go home with home health this evening.   Blood pressure 156/87, pulse 83, temperature 98.2  F (36.8  C), temperature source Oral, resp. rate 16, height 1.524 m (5'), weight 82.1 kg (180 lb 14.4 oz), SpO2 94 %.    Temperatures:  Current - Temp: 98.2  F (36.8  C); Max - Temp  Av  F (36.7  C)  Min: 97.8  F (36.6  C)  Max: 98.2  F (36.8  C)  Pulse range: Pulse  Av.3  Min: 60  Max: 90  Blood pressure range: Systolic (24hrs), Av , Min:122 , Max:156   ; Diastolic (24hrs), Av, Min:66, Max:87    Exam:  CMS: intact  alert, stable, wound ok  Calf s/nt  Dressing c/d/i  Patient communicates clearly with examiner    Labs:  No results for input(s): POTASSIUM in the last 74402 hours.  Recent Labs   Lab Test  18   0640  18   0715   HGB  11.3*  11.7     No results for input(s): INR in the last 35557 hours.  No results for input(s): PLT in the last 73199 hours.    PLAN: patient will discharge home this afternoon. She has been taking celebrex and aspirin here in hospital and states it works well for her. Years ago she said her rheumatologist told her not to take both of these together. She states she will give him a call today and verify that it is okay for her to be on both. I told her she definitely needed to be on aspirin to prevent blood clots.

## 2018-01-25 NOTE — PLAN OF CARE
Problem: Patient Care Overview  Goal: Plan of Care/Patient Progress Review    Discharge Planner PT   Patient plan for discharge: home with assist from , OP PT  Current status: Pt performed bed mobility with min assist and sit to/from stand transfers with CGA.  Gait training x 160 ft using wheeled walker and CGA.  Slow pace.  Performed 3 stairs x 1 using both hands on 1 rail with min assist.  Knee AAROM is 15-70 degrees.  Barriers to return to prior living situation: None  Recommendations for discharge: Home with assist and OP PT per plan established by the PT.   Rationale for recommendations: Pt is progressing well.  Will have assist at home and will benefit from OP PT to continue to progress strength and ROM postop.       Entered by: Anahi Coulter 01/25/2018 12:16 PM

## 2018-01-29 ENCOUNTER — HOSPITAL ENCOUNTER (OUTPATIENT)
Dept: ULTRASOUND IMAGING | Facility: CLINIC | Age: 68
Discharge: HOME | End: 2018-01-29
Attending: ORTHOPAEDIC SURGERY | Admitting: ORTHOPAEDIC SURGERY
Payer: COMMERCIAL

## 2018-01-29 DIAGNOSIS — I82.4Y1 DEEP VEIN THROMBOSIS (DVT) OF PROXIMAL VEIN OF RIGHT LOWER EXTREMITY, UNSPECIFIED CHRONICITY (H): ICD-10-CM

## 2018-01-29 PROCEDURE — 93971 EXTREMITY STUDY: CPT | Mod: RT

## 2019-06-12 ENCOUNTER — ANESTHESIA EVENT (OUTPATIENT)
Dept: SURGERY | Facility: CLINIC | Age: 69
DRG: 483 | End: 2019-06-12
Payer: COMMERCIAL

## 2019-06-12 RX ORDER — PSEUDOEPHEDRINE HCL 120 MG/1
1 TABLET, FILM COATED, EXTENDED RELEASE ORAL AT BEDTIME
COMMUNITY

## 2019-06-12 RX ORDER — ASPIRIN 81 MG/1
81 TABLET ORAL DAILY
Status: ON HOLD | COMMUNITY
End: 2019-06-14

## 2019-06-12 RX ORDER — LANOLIN ALCOHOL/MO/W.PET/CERES
1 CREAM (GRAM) TOPICAL EVERY MORNING
COMMUNITY

## 2019-06-12 RX ORDER — MOMETASONE FUROATE MONOHYDRATE 50 UG/1
2 SPRAY, METERED NASAL EVERY MORNING
COMMUNITY
End: 2022-11-10

## 2019-06-12 RX ORDER — CALCIUM CARBONATE/VITAMIN D3 500-10/5ML
400 LIQUID (ML) ORAL EVERY EVENING
Status: ON HOLD | COMMUNITY
End: 2020-01-07

## 2019-06-12 RX ORDER — FOLIC ACID, .BETA.-CAROTENE, ASCORBIC ACID, CHOLECALCIFEROL, .ALPHA.-TOCOPHEROL ACETATE, DL-, THIAMINE MONONITRATE, RIBOFLAVIN, NIACINAMIDE, PYRIDOXINE HYDROCHLORIDE, CYANOCOBALAMIN, CALCIUM PANTOTHENATE, CALCIUM CARBONATE, FERROUS FUMARATE, AND ZINC OXIDE 1; 1000; 100; 400; 30; 3; 3; 15; 20; 12; 7; 200; 29; 20 MG/1; [IU]/1; MG/1; [IU]/1; [IU]/1; MG/1; MG/1; MG/1; MG/1; UG/1; MG/1; MG/1; MG/1; MG/1
1 TABLET, CHEWABLE ORAL EVERY EVENING
COMMUNITY
End: 2021-03-19

## 2019-06-12 RX ORDER — MELOXICAM 15 MG/1
15 TABLET ORAL EVERY EVENING
Status: ON HOLD | COMMUNITY
End: 2020-01-10

## 2019-06-12 RX ORDER — TRIAMTERENE/HYDROCHLOROTHIAZID 37.5-25 MG
1 TABLET ORAL EVERY MORNING
COMMUNITY
End: 2021-03-19

## 2019-06-13 ENCOUNTER — HOSPITAL ENCOUNTER (INPATIENT)
Facility: CLINIC | Age: 69
LOS: 1 days | Discharge: HOME | DRG: 483 | End: 2019-06-14
Attending: ORTHOPAEDIC SURGERY | Admitting: ORTHOPAEDIC SURGERY
Payer: COMMERCIAL

## 2019-06-13 ENCOUNTER — ANESTHESIA (OUTPATIENT)
Dept: SURGERY | Facility: CLINIC | Age: 69
DRG: 483 | End: 2019-06-13
Payer: COMMERCIAL

## 2019-06-13 DIAGNOSIS — Z96.611 STATUS POST REVERSE TOTAL REPLACEMENT OF RIGHT SHOULDER: Primary | ICD-10-CM

## 2019-06-13 PROBLEM — Z96.619 S/P REVERSE TOTAL SHOULDER ARTHROPLASTY: Status: ACTIVE | Noted: 2019-06-13

## 2019-06-13 PROBLEM — Z96.619 H/O TOTAL SHOULDER REPLACEMENT: Status: ACTIVE | Noted: 2019-06-13

## 2019-06-13 LAB
GLUCOSE SERPL-MCNC: 146 MG/DL (ref 70–99)
POTASSIUM SERPL-SCNC: 3.5 MMOL/L (ref 3.4–5.3)

## 2019-06-13 PROCEDURE — 25000566 ZZH SEVOFLURANE, EA 15 MIN: Performed by: ORTHOPAEDIC SURGERY

## 2019-06-13 PROCEDURE — 40000170 ZZH STATISTIC PRE-PROCEDURE ASSESSMENT II: Performed by: ORTHOPAEDIC SURGERY

## 2019-06-13 PROCEDURE — 71000012 ZZH RECOVERY PHASE 1 LEVEL 1 FIRST HR: Performed by: ORTHOPAEDIC SURGERY

## 2019-06-13 PROCEDURE — 25000125 ZZHC RX 250: Performed by: ORTHOPAEDIC SURGERY

## 2019-06-13 PROCEDURE — 27210794 ZZH OR GENERAL SUPPLY STERILE: Performed by: ORTHOPAEDIC SURGERY

## 2019-06-13 PROCEDURE — 25800030 ZZH RX IP 258 OP 636: Performed by: PHYSICIAN ASSISTANT

## 2019-06-13 PROCEDURE — P9041 ALBUMIN (HUMAN),5%, 50ML: HCPCS | Performed by: NURSE ANESTHETIST, CERTIFIED REGISTERED

## 2019-06-13 PROCEDURE — 71000013 ZZH RECOVERY PHASE 1 LEVEL 1 EA ADDTL HR: Performed by: ORTHOPAEDIC SURGERY

## 2019-06-13 PROCEDURE — 25000128 H RX IP 250 OP 636: Performed by: PHYSICIAN ASSISTANT

## 2019-06-13 PROCEDURE — 25000128 H RX IP 250 OP 636: Performed by: ANESTHESIOLOGY

## 2019-06-13 PROCEDURE — 37000009 ZZH ANESTHESIA TECHNICAL FEE, EACH ADDTL 15 MIN: Performed by: ORTHOPAEDIC SURGERY

## 2019-06-13 PROCEDURE — 25000128 H RX IP 250 OP 636: Performed by: NURSE ANESTHETIST, CERTIFIED REGISTERED

## 2019-06-13 PROCEDURE — 82947 ASSAY GLUCOSE BLOOD QUANT: CPT | Performed by: ANESTHESIOLOGY

## 2019-06-13 PROCEDURE — 25800025 ZZH RX 258: Performed by: ORTHOPAEDIC SURGERY

## 2019-06-13 PROCEDURE — 25800030 ZZH RX IP 258 OP 636: Performed by: ANESTHESIOLOGY

## 2019-06-13 PROCEDURE — A9270 NON-COVERED ITEM OR SERVICE: HCPCS | Performed by: ORTHOPAEDIC SURGERY

## 2019-06-13 PROCEDURE — 36000063 ZZH SURGERY LEVEL 4 EA 15 ADDTL MIN: Performed by: ORTHOPAEDIC SURGERY

## 2019-06-13 PROCEDURE — 25000128 H RX IP 250 OP 636: Performed by: ORTHOPAEDIC SURGERY

## 2019-06-13 PROCEDURE — 25000125 ZZHC RX 250: Performed by: NURSE ANESTHETIST, CERTIFIED REGISTERED

## 2019-06-13 PROCEDURE — 25000125 ZZHC RX 250: Performed by: PHYSICIAN ASSISTANT

## 2019-06-13 PROCEDURE — 25000132 ZZH RX MED GY IP 250 OP 250 PS 637: Performed by: ORTHOPAEDIC SURGERY

## 2019-06-13 PROCEDURE — 37000008 ZZH ANESTHESIA TECHNICAL FEE, 1ST 30 MIN: Performed by: ORTHOPAEDIC SURGERY

## 2019-06-13 PROCEDURE — 25800030 ZZH RX IP 258 OP 636: Performed by: ORTHOPAEDIC SURGERY

## 2019-06-13 PROCEDURE — 0RRJ00Z REPLACEMENT OF RIGHT SHOULDER JOINT WITH REVERSE BALL AND SOCKET SYNTHETIC SUBSTITUTE, OPEN APPROACH: ICD-10-PCS | Performed by: ORTHOPAEDIC SURGERY

## 2019-06-13 PROCEDURE — 84132 ASSAY OF SERUM POTASSIUM: CPT | Performed by: ANESTHESIOLOGY

## 2019-06-13 PROCEDURE — 25800030 ZZH RX IP 258 OP 636: Performed by: NURSE ANESTHETIST, CERTIFIED REGISTERED

## 2019-06-13 PROCEDURE — C1776 JOINT DEVICE (IMPLANTABLE): HCPCS | Performed by: ORTHOPAEDIC SURGERY

## 2019-06-13 PROCEDURE — 12000000 ZZH R&B MED SURG/OB

## 2019-06-13 PROCEDURE — 36000093 ZZH SURGERY LEVEL 4 1ST 30 MIN: Performed by: ORTHOPAEDIC SURGERY

## 2019-06-13 DEVICE — IMPLANTABLE DEVICE: Type: IMPLANTABLE DEVICE | Site: SHOULDER | Status: FUNCTIONAL

## 2019-06-13 RX ORDER — SODIUM CHLORIDE, SODIUM LACTATE, POTASSIUM CHLORIDE, CALCIUM CHLORIDE 600; 310; 30; 20 MG/100ML; MG/100ML; MG/100ML; MG/100ML
INJECTION, SOLUTION INTRAVENOUS CONTINUOUS
Status: DISCONTINUED | OUTPATIENT
Start: 2019-06-13 | End: 2019-06-13 | Stop reason: HOSPADM

## 2019-06-13 RX ORDER — LIDOCAINE 40 MG/G
CREAM TOPICAL
Status: DISCONTINUED | OUTPATIENT
Start: 2019-06-13 | End: 2019-06-13 | Stop reason: HOSPADM

## 2019-06-13 RX ORDER — MELOXICAM 7.5 MG/1
15 TABLET ORAL EVERY EVENING
Status: DISCONTINUED | OUTPATIENT
Start: 2019-06-13 | End: 2019-06-14 | Stop reason: HOSPADM

## 2019-06-13 RX ORDER — LIDOCAINE 4 G/G
1 PATCH TOPICAL DAILY PRN
Status: DISCONTINUED | OUTPATIENT
Start: 2019-06-13 | End: 2019-06-14 | Stop reason: HOSPADM

## 2019-06-13 RX ORDER — CEFAZOLIN SODIUM 1 G/3ML
1 INJECTION, POWDER, FOR SOLUTION INTRAMUSCULAR; INTRAVENOUS
Status: DISCONTINUED | OUTPATIENT
Start: 2019-06-13 | End: 2019-06-13 | Stop reason: HOSPADM

## 2019-06-13 RX ORDER — CEFAZOLIN SODIUM 2 G/100ML
2 INJECTION, SOLUTION INTRAVENOUS EVERY 8 HOURS
Status: DISPENSED | OUTPATIENT
Start: 2019-06-13 | End: 2019-06-14

## 2019-06-13 RX ORDER — ACETAMINOPHEN 325 MG/1
975 TABLET ORAL EVERY 8 HOURS
Status: DISCONTINUED | OUTPATIENT
Start: 2019-06-13 | End: 2019-06-14 | Stop reason: HOSPADM

## 2019-06-13 RX ORDER — NALOXONE HYDROCHLORIDE 0.4 MG/ML
.1-.4 INJECTION, SOLUTION INTRAMUSCULAR; INTRAVENOUS; SUBCUTANEOUS
Status: DISCONTINUED | OUTPATIENT
Start: 2019-06-13 | End: 2019-06-13

## 2019-06-13 RX ORDER — ACETAMINOPHEN 325 MG/1
650 TABLET ORAL EVERY 4 HOURS PRN
Status: DISCONTINUED | OUTPATIENT
Start: 2019-06-16 | End: 2019-06-13

## 2019-06-13 RX ORDER — MAGNESIUM OXIDE 400 MG/1
400 TABLET ORAL EVERY EVENING
Status: DISCONTINUED | OUTPATIENT
Start: 2019-06-13 | End: 2019-06-14 | Stop reason: HOSPADM

## 2019-06-13 RX ORDER — DEXAMETHASONE SODIUM PHOSPHATE 4 MG/ML
INJECTION, SOLUTION INTRA-ARTICULAR; INTRALESIONAL; INTRAMUSCULAR; INTRAVENOUS; SOFT TISSUE PRN
Status: DISCONTINUED | OUTPATIENT
Start: 2019-06-13 | End: 2019-06-13

## 2019-06-13 RX ORDER — HYDROMORPHONE HYDROCHLORIDE 1 MG/ML
.3-.5 INJECTION, SOLUTION INTRAMUSCULAR; INTRAVENOUS; SUBCUTANEOUS
Status: DISCONTINUED | OUTPATIENT
Start: 2019-06-13 | End: 2019-06-14 | Stop reason: HOSPADM

## 2019-06-13 RX ORDER — AMOXICILLIN 250 MG
2 CAPSULE ORAL 2 TIMES DAILY
Status: DISCONTINUED | OUTPATIENT
Start: 2019-06-13 | End: 2019-06-14 | Stop reason: HOSPADM

## 2019-06-13 RX ORDER — LORAZEPAM 2 MG/ML
.5-1 INJECTION INTRAMUSCULAR
Status: DISCONTINUED | OUTPATIENT
Start: 2019-06-13 | End: 2019-06-13 | Stop reason: HOSPADM

## 2019-06-13 RX ORDER — NALOXONE HYDROCHLORIDE 0.4 MG/ML
.1-.4 INJECTION, SOLUTION INTRAMUSCULAR; INTRAVENOUS; SUBCUTANEOUS
Status: DISCONTINUED | OUTPATIENT
Start: 2019-06-13 | End: 2019-06-14 | Stop reason: HOSPADM

## 2019-06-13 RX ORDER — ONDANSETRON 4 MG/1
4 TABLET, ORALLY DISINTEGRATING ORAL EVERY 6 HOURS PRN
Status: DISCONTINUED | OUTPATIENT
Start: 2019-06-13 | End: 2019-06-13

## 2019-06-13 RX ORDER — EPHEDRINE SULFATE 50 MG/ML
INJECTION, SOLUTION INTRAMUSCULAR; INTRAVENOUS; SUBCUTANEOUS PRN
Status: DISCONTINUED | OUTPATIENT
Start: 2019-06-13 | End: 2019-06-13

## 2019-06-13 RX ORDER — FENTANYL CITRATE 50 UG/ML
INJECTION, SOLUTION INTRAMUSCULAR; INTRAVENOUS PRN
Status: DISCONTINUED | OUTPATIENT
Start: 2019-06-13 | End: 2019-06-13

## 2019-06-13 RX ORDER — LANOLIN ALCOHOL/MO/W.PET/CERES
400 CREAM (GRAM) TOPICAL DAILY
Status: DISCONTINUED | OUTPATIENT
Start: 2019-06-13 | End: 2019-06-14 | Stop reason: HOSPADM

## 2019-06-13 RX ORDER — ACETAMINOPHEN 325 MG/1
650 TABLET ORAL EVERY 4 HOURS PRN
Status: DISCONTINUED | OUTPATIENT
Start: 2019-06-16 | End: 2019-06-14 | Stop reason: HOSPADM

## 2019-06-13 RX ORDER — NEOSTIGMINE METHYLSULFATE 1 MG/ML
VIAL (ML) INJECTION PRN
Status: DISCONTINUED | OUTPATIENT
Start: 2019-06-13 | End: 2019-06-13

## 2019-06-13 RX ORDER — ALBUTEROL SULFATE 0.83 MG/ML
2.5 SOLUTION RESPIRATORY (INHALATION)
Status: DISCONTINUED | OUTPATIENT
Start: 2019-06-13 | End: 2019-06-13 | Stop reason: HOSPADM

## 2019-06-13 RX ORDER — CHLORAL HYDRATE 500 MG
1000 CAPSULE ORAL DAILY
Status: DISCONTINUED | OUTPATIENT
Start: 2019-06-14 | End: 2019-06-14 | Stop reason: HOSPADM

## 2019-06-13 RX ORDER — HYDROMORPHONE HYDROCHLORIDE 1 MG/ML
.3-.5 INJECTION, SOLUTION INTRAMUSCULAR; INTRAVENOUS; SUBCUTANEOUS EVERY 5 MIN PRN
Status: DISCONTINUED | OUTPATIENT
Start: 2019-06-13 | End: 2019-06-13 | Stop reason: HOSPADM

## 2019-06-13 RX ORDER — LIDOCAINE HYDROCHLORIDE 20 MG/ML
INJECTION, SOLUTION INFILTRATION; PERINEURAL PRN
Status: DISCONTINUED | OUTPATIENT
Start: 2019-06-13 | End: 2019-06-13

## 2019-06-13 RX ORDER — ONDANSETRON 4 MG/1
4 TABLET, ORALLY DISINTEGRATING ORAL EVERY 6 HOURS PRN
Status: DISCONTINUED | OUTPATIENT
Start: 2019-06-13 | End: 2019-06-14 | Stop reason: HOSPADM

## 2019-06-13 RX ORDER — ALBUTEROL SULFATE 0.83 MG/ML
2.5 SOLUTION RESPIRATORY (INHALATION) EVERY 4 HOURS PRN
Status: DISCONTINUED | OUTPATIENT
Start: 2019-06-13 | End: 2019-06-13 | Stop reason: HOSPADM

## 2019-06-13 RX ORDER — BUPIVACAINE HYDROCHLORIDE AND EPINEPHRINE 2.5; 5 MG/ML; UG/ML
INJECTION, SOLUTION EPIDURAL; INFILTRATION; INTRACAUDAL; PERINEURAL PRN
Status: DISCONTINUED | OUTPATIENT
Start: 2019-06-13 | End: 2019-06-13 | Stop reason: HOSPADM

## 2019-06-13 RX ORDER — GLYCOPYRROLATE 0.2 MG/ML
INJECTION, SOLUTION INTRAMUSCULAR; INTRAVENOUS PRN
Status: DISCONTINUED | OUTPATIENT
Start: 2019-06-13 | End: 2019-06-13

## 2019-06-13 RX ORDER — KETOROLAC TROMETHAMINE 15 MG/ML
15 INJECTION, SOLUTION INTRAMUSCULAR; INTRAVENOUS
Status: DISCONTINUED | OUTPATIENT
Start: 2019-06-13 | End: 2019-06-13 | Stop reason: HOSPADM

## 2019-06-13 RX ORDER — TRIAMTERENE/HYDROCHLOROTHIAZID 37.5-25 MG
1 TABLET ORAL EVERY MORNING
Status: DISCONTINUED | OUTPATIENT
Start: 2019-06-14 | End: 2019-06-14 | Stop reason: HOSPADM

## 2019-06-13 RX ORDER — OXYCODONE HYDROCHLORIDE 5 MG/1
5-10 TABLET ORAL EVERY 4 HOURS PRN
Status: DISCONTINUED | OUTPATIENT
Start: 2019-06-13 | End: 2019-06-14 | Stop reason: HOSPADM

## 2019-06-13 RX ORDER — ONDANSETRON 2 MG/ML
INJECTION INTRAMUSCULAR; INTRAVENOUS PRN
Status: DISCONTINUED | OUTPATIENT
Start: 2019-06-13 | End: 2019-06-13

## 2019-06-13 RX ORDER — CEFAZOLIN SODIUM 1 G/3ML
1 INJECTION, POWDER, FOR SOLUTION INTRAMUSCULAR; INTRAVENOUS SEE ADMIN INSTRUCTIONS
Status: DISCONTINUED | OUTPATIENT
Start: 2019-06-13 | End: 2019-06-13 | Stop reason: HOSPADM

## 2019-06-13 RX ORDER — CEFAZOLIN SODIUM 2 G/100ML
2 INJECTION, SOLUTION INTRAVENOUS
Status: COMPLETED | OUTPATIENT
Start: 2019-06-13 | End: 2019-06-13

## 2019-06-13 RX ORDER — FENTANYL CITRATE 50 UG/ML
25-100 INJECTION, SOLUTION INTRAMUSCULAR; INTRAVENOUS
Status: DISCONTINUED | OUTPATIENT
Start: 2019-06-13 | End: 2019-06-13 | Stop reason: HOSPADM

## 2019-06-13 RX ORDER — ONDANSETRON 2 MG/ML
4 INJECTION INTRAMUSCULAR; INTRAVENOUS EVERY 30 MIN PRN
Status: DISCONTINUED | OUTPATIENT
Start: 2019-06-13 | End: 2019-06-13 | Stop reason: HOSPADM

## 2019-06-13 RX ORDER — ALBUMIN, HUMAN INJ 5% 5 %
SOLUTION INTRAVENOUS CONTINUOUS PRN
Status: DISCONTINUED | OUTPATIENT
Start: 2019-06-13 | End: 2019-06-13

## 2019-06-13 RX ORDER — FLUTICASONE PROPIONATE 50 MCG
2 SPRAY, SUSPENSION (ML) NASAL DAILY
Status: DISCONTINUED | OUTPATIENT
Start: 2019-06-14 | End: 2019-06-14 | Stop reason: HOSPADM

## 2019-06-13 RX ORDER — FENTANYL CITRATE 50 UG/ML
25-50 INJECTION, SOLUTION INTRAMUSCULAR; INTRAVENOUS
Status: DISCONTINUED | OUTPATIENT
Start: 2019-06-13 | End: 2019-06-13 | Stop reason: HOSPADM

## 2019-06-13 RX ORDER — HYDROXYZINE HYDROCHLORIDE 10 MG/1
10 TABLET, FILM COATED ORAL EVERY 6 HOURS PRN
Status: DISCONTINUED | OUTPATIENT
Start: 2019-06-13 | End: 2019-06-14 | Stop reason: HOSPADM

## 2019-06-13 RX ORDER — LIDOCAINE 40 MG/G
CREAM TOPICAL
Status: DISCONTINUED | OUTPATIENT
Start: 2019-06-13 | End: 2019-06-14 | Stop reason: HOSPADM

## 2019-06-13 RX ORDER — AMOXICILLIN 250 MG
1 CAPSULE ORAL 2 TIMES DAILY
Status: DISCONTINUED | OUTPATIENT
Start: 2019-06-13 | End: 2019-06-14 | Stop reason: HOSPADM

## 2019-06-13 RX ORDER — ONDANSETRON 2 MG/ML
4 INJECTION INTRAMUSCULAR; INTRAVENOUS EVERY 6 HOURS PRN
Status: DISCONTINUED | OUTPATIENT
Start: 2019-06-13 | End: 2019-06-14 | Stop reason: HOSPADM

## 2019-06-13 RX ORDER — ONDANSETRON 4 MG/1
4 TABLET, ORALLY DISINTEGRATING ORAL EVERY 30 MIN PRN
Status: DISCONTINUED | OUTPATIENT
Start: 2019-06-13 | End: 2019-06-13 | Stop reason: HOSPADM

## 2019-06-13 RX ORDER — SODIUM CHLORIDE, SODIUM LACTATE, POTASSIUM CHLORIDE, CALCIUM CHLORIDE 600; 310; 30; 20 MG/100ML; MG/100ML; MG/100ML; MG/100ML
INJECTION, SOLUTION INTRAVENOUS CONTINUOUS
Status: DISCONTINUED | OUTPATIENT
Start: 2019-06-13 | End: 2019-06-14 | Stop reason: HOSPADM

## 2019-06-13 RX ORDER — ALBUTEROL SULFATE 90 UG/1
2 AEROSOL, METERED RESPIRATORY (INHALATION) EVERY 6 HOURS PRN
Status: DISCONTINUED | OUTPATIENT
Start: 2019-06-13 | End: 2019-06-14 | Stop reason: HOSPADM

## 2019-06-13 RX ORDER — TRIAMTERENE AND HYDROCHLOROTHIAZIDE 37.5; 25 MG/1; MG/1
1 CAPSULE ORAL EVERY MORNING
Status: DISCONTINUED | OUTPATIENT
Start: 2019-06-14 | End: 2019-06-13 | Stop reason: RX

## 2019-06-13 RX ORDER — MEPERIDINE HYDROCHLORIDE 25 MG/ML
12.5 INJECTION INTRAMUSCULAR; INTRAVENOUS; SUBCUTANEOUS EVERY 5 MIN PRN
Status: DISCONTINUED | OUTPATIENT
Start: 2019-06-13 | End: 2019-06-13 | Stop reason: HOSPADM

## 2019-06-13 RX ORDER — AMOXICILLIN 250 MG
1-2 CAPSULE ORAL 2 TIMES DAILY
Status: DISCONTINUED | OUTPATIENT
Start: 2019-06-13 | End: 2019-06-13

## 2019-06-13 RX ORDER — PROPOFOL 10 MG/ML
INJECTION, EMULSION INTRAVENOUS PRN
Status: DISCONTINUED | OUTPATIENT
Start: 2019-06-13 | End: 2019-06-13

## 2019-06-13 RX ADMIN — NEOSTIGMINE METHYLSULFATE 4 MG: 1 INJECTION, SOLUTION INTRAVENOUS at 11:06

## 2019-06-13 RX ADMIN — SENNOSIDES AND DOCUSATE SODIUM 1 TABLET: 8.6; 5 TABLET ORAL at 19:42

## 2019-06-13 RX ADMIN — PHENYLEPHRINE HYDROCHLORIDE 150 MCG: 10 INJECTION INTRAVENOUS at 07:49

## 2019-06-13 RX ADMIN — ROCURONIUM BROMIDE 5 MG: 10 INJECTION INTRAVENOUS at 09:57

## 2019-06-13 RX ADMIN — ALBUMIN HUMAN: 0.05 INJECTION, SOLUTION INTRAVENOUS at 08:10

## 2019-06-13 RX ADMIN — SODIUM CHLORIDE, POTASSIUM CHLORIDE, SODIUM LACTATE AND CALCIUM CHLORIDE: 600; 310; 30; 20 INJECTION, SOLUTION INTRAVENOUS at 07:22

## 2019-06-13 RX ADMIN — MELOXICAM 15 MG: 7.5 TABLET ORAL at 19:42

## 2019-06-13 RX ADMIN — ASPIRIN 325 MG: 325 TABLET, DELAYED RELEASE ORAL at 17:07

## 2019-06-13 RX ADMIN — ALBUMIN HUMAN: 0.05 INJECTION, SOLUTION INTRAVENOUS at 08:30

## 2019-06-13 RX ADMIN — PHENYLEPHRINE HYDROCHLORIDE 100 MCG: 10 INJECTION INTRAVENOUS at 08:16

## 2019-06-13 RX ADMIN — TRANEXAMIC ACID 1 G: 1 INJECTION, SOLUTION INTRAVENOUS at 08:15

## 2019-06-13 RX ADMIN — FENTANYL CITRATE 50 MCG: 50 INJECTION, SOLUTION INTRAMUSCULAR; INTRAVENOUS at 07:30

## 2019-06-13 RX ADMIN — Medication 5 MG: at 08:46

## 2019-06-13 RX ADMIN — PHENYLEPHRINE HYDROCHLORIDE 100 MCG: 10 INJECTION INTRAVENOUS at 09:00

## 2019-06-13 RX ADMIN — MAGNESIUM OXIDE TAB 400 MG (241.3 MG ELEMENTAL MG) 400 MG: 400 (241.3 MG) TAB at 19:42

## 2019-06-13 RX ADMIN — PHENYLEPHRINE HYDROCHLORIDE 150 MCG: 10 INJECTION INTRAVENOUS at 09:46

## 2019-06-13 RX ADMIN — PHENYLEPHRINE HYDROCHLORIDE 100 MCG: 10 INJECTION INTRAVENOUS at 07:45

## 2019-06-13 RX ADMIN — TRANEXAMIC ACID 1 G: 1 INJECTION, SOLUTION INTRAVENOUS at 10:56

## 2019-06-13 RX ADMIN — PHENYLEPHRINE HYDROCHLORIDE 100 MCG: 10 INJECTION INTRAVENOUS at 08:10

## 2019-06-13 RX ADMIN — PHENYLEPHRINE HYDROCHLORIDE 200 MCG: 10 INJECTION INTRAVENOUS at 08:46

## 2019-06-13 RX ADMIN — SODIUM CHLORIDE, POTASSIUM CHLORIDE, SODIUM LACTATE AND CALCIUM CHLORIDE: 600; 310; 30; 20 INJECTION, SOLUTION INTRAVENOUS at 14:34

## 2019-06-13 RX ADMIN — PHENYLEPHRINE HYDROCHLORIDE 200 MCG: 10 INJECTION INTRAVENOUS at 10:00

## 2019-06-13 RX ADMIN — PROPOFOL 160 MG: 10 INJECTION, EMULSION INTRAVENOUS at 07:42

## 2019-06-13 RX ADMIN — ROCURONIUM BROMIDE 40 MG: 10 INJECTION INTRAVENOUS at 07:42

## 2019-06-13 RX ADMIN — PHENYLEPHRINE HYDROCHLORIDE 100 MCG: 10 INJECTION INTRAVENOUS at 08:04

## 2019-06-13 RX ADMIN — GLYCOPYRROLATE 0.6 MG: 0.2 INJECTION, SOLUTION INTRAMUSCULAR; INTRAVENOUS at 11:06

## 2019-06-13 RX ADMIN — OXYCODONE HYDROCHLORIDE 5 MG: 5 TABLET ORAL at 21:43

## 2019-06-13 RX ADMIN — CEFAZOLIN SODIUM 2 G: 2 INJECTION, SOLUTION INTRAVENOUS at 17:07

## 2019-06-13 RX ADMIN — FENTANYL CITRATE 100 MCG: 50 INJECTION, SOLUTION INTRAMUSCULAR; INTRAVENOUS at 07:42

## 2019-06-13 RX ADMIN — ONDANSETRON 4 MG: 2 INJECTION INTRAMUSCULAR; INTRAVENOUS at 10:54

## 2019-06-13 RX ADMIN — SODIUM CHLORIDE, POTASSIUM CHLORIDE, SODIUM LACTATE AND CALCIUM CHLORIDE: 600; 310; 30; 20 INJECTION, SOLUTION INTRAVENOUS at 09:10

## 2019-06-13 RX ADMIN — MIDAZOLAM HYDROCHLORIDE 1 MG: 1 INJECTION, SOLUTION INTRAMUSCULAR; INTRAVENOUS at 07:30

## 2019-06-13 RX ADMIN — DEXAMETHASONE SODIUM PHOSPHATE 4 MG: 4 INJECTION, SOLUTION INTRA-ARTICULAR; INTRALESIONAL; INTRAMUSCULAR; INTRAVENOUS; SOFT TISSUE at 07:42

## 2019-06-13 RX ADMIN — Medication 5 MG: at 09:19

## 2019-06-13 RX ADMIN — CEFAZOLIN SODIUM 1 G: 2 INJECTION, SOLUTION INTRAVENOUS at 10:10

## 2019-06-13 RX ADMIN — LIDOCAINE HYDROCHLORIDE 100 MG: 20 INJECTION, SOLUTION INFILTRATION; PERINEURAL at 07:42

## 2019-06-13 RX ADMIN — PHENYLEPHRINE HYDROCHLORIDE 100 MCG: 10 INJECTION INTRAVENOUS at 09:19

## 2019-06-13 RX ADMIN — Medication 5 MG: at 08:10

## 2019-06-13 RX ADMIN — PHENYLEPHRINE HYDROCHLORIDE 0.25 MCG/KG/MIN: 10 INJECTION INTRAVENOUS at 08:10

## 2019-06-13 RX ADMIN — CEFAZOLIN SODIUM 2 G: 2 INJECTION, SOLUTION INTRAVENOUS at 08:10

## 2019-06-13 RX ADMIN — Medication 5 MG: at 07:58

## 2019-06-13 RX ADMIN — BUPIVACAINE HYDROCHLORIDE 15 ML GIVEN: 5 INJECTION, SOLUTION EPIDURAL; INTRACAUDAL; PERINEURAL at 07:33

## 2019-06-13 ASSESSMENT — ACTIVITIES OF DAILY LIVING (ADL)
ADLS_ACUITY_SCORE: 11
ADLS_ACUITY_SCORE: 11

## 2019-06-13 ASSESSMENT — MIFFLIN-ST. JEOR: SCORE: 1254.37

## 2019-06-13 ASSESSMENT — LIFESTYLE VARIABLES: TOBACCO_USE: 1

## 2019-06-13 ASSESSMENT — ENCOUNTER SYMPTOMS
SEIZURES: 0
DYSRHYTHMIAS: 0

## 2019-06-13 NOTE — ANESTHESIA CARE TRANSFER NOTE
Patient: Melissa Templeton    Procedure(s):  RIGHT TOTAL SHOULDER ARTHROPLASTY REVERSE (TORNIER)^    Diagnosis: PAINFUL SHOULDER  Diagnosis Additional Information: No value filed.    Anesthesia Type:   General, ETT     Note:  Airway :Face Mask  Patient transferred to:PACU  Comments: Neuromuscular blockade reversed after TOF 4/4, spontaneous respirations, adequate tidal volumes, followed commands to voice, oropharynx suctioned with soft flexible catheter, extubated atraumatically, extubated with suction, airway patent after extubation.  Oxygen via facemask at 8 liters per minute to PACU. Oxygen tubing connected to wall O2 in PACU, SpO2, NiBP, and EKG monitors and alarms on and functioning, Polina Hugger warmer connected to patient gown, report on patient's clinical status given to PACU RN, RN questions answered. Handoff Report: Identifed the Patient, Identified the Reponsible Provider, Reviewed the pertinent medical history, Discussed the surgical course, Reviewed Intra-OP anesthesia mangement and issues during anesthesia, Set expectations for post-procedure period and Allowed opportunity for questions and acknowledgement of understanding      Vitals: (Last set prior to Anesthesia Care Transfer)    CRNA VITALS  6/13/2019 1053 - 6/13/2019 1130      6/13/2019             NIBP:  103/65    Pulse:  95    NIBP Mean:  83    SpO2:  94 %                Electronically Signed By: INDIA Garcia CRNA  June 13, 2019  11:30 AM

## 2019-06-13 NOTE — ANESTHESIA POSTPROCEDURE EVALUATION
Patient: Melissa Templeton    Procedure(s):  RIGHT TOTAL SHOULDER ARTHROPLASTY REVERSE (TORNIER)^    Diagnosis:PAINFUL SHOULDER  Diagnosis Additional Information: No value filed.    Anesthesia Type:  General, ETT    Note:  Anesthesia Post Evaluation    Patient location during evaluation: Bedside  Patient participation: Able to fully participate in evaluation  Level of consciousness: awake and alert  Pain management: adequate  Airway patency: patent  Cardiovascular status: acceptable  Respiratory status: acceptable  Hydration status: acceptable  PONV: none             Last vitals:  Vitals:    06/13/19 1340 06/13/19 1430 06/13/19 1500   BP: 98/63 108/58 105/62   Pulse: 93 89 87   Resp: 18 18 (!) 1   Temp:  36.4  C (97.5  F)    SpO2: 93% 94% 95%         Electronically Signed By: Yogi Nash MD  June 13, 2019  3:05 PM

## 2019-06-13 NOTE — PROGRESS NOTES
hospitalist consult received; pt is a 67yo female with PMHx chronic constipation, KRISTIN-CPAP, OA of shoulder who underwent previously scheduled right total shoulder arthroplasty. Pt does not have chronic medical conditions requiring daily monitoring, hospitalist will sign-off. Please call with acute concerns.    Wei Beltran PA-C  6/13/2019, 3:12 PM  Pager: [phone]

## 2019-06-13 NOTE — ANESTHESIA PROCEDURE NOTES
Peripheral nerve/Neuraxial procedure note : Interscalene  Pre-Procedure  Performed by Yogi Nash MD  Location: pre-op      Pre-Anesthestic Checklist: patient identified, IV checked, site marked, risks and benefits discussed, informed consent, monitors and equipment checked, pre-op evaluation, at physician/surgeon's request and post-op pain management    Timeout  Correct Patient: Yes   Correct Procedure: Yes   Correct Site: Yes   Correct Laterality: Yes   Correct Position: Yes   Site Marked: Yes   .   Procedure Documentation    .    Procedure:  right  Interscalene.  Local skin infiltrated with mL of 1% lidocaine.     Ultrasound used to identify targeted nerve, plexus, or vascular marker and placed a needle adjacent to it., Ultrasound was used to visualize the spread of the anesthetic in close proximity to the above stated nerve. A permanent image is entered into the patient's record.  Patient Prep;mask, sterile gloves, chlorhexidine gluconate and isopropyl alcohol.  .  Needle: insulated Needle Gauge: 21.    Needle Length (Inches) 2  .       Assessment/Narrative  Paresthesias: Resolved.  Injection made incrementally with aspirations every 5 mL..  The placement was negative for: blood aspirated and painful injection.  . Comments:  Patient tolerated procedure well.  Total of 15 ml injected incrementally with no sign of intravascular injection.

## 2019-06-13 NOTE — ANESTHESIA PREPROCEDURE EVALUATION
Anesthesia Pre-Procedure Evaluation    Patient: Melissa Templeton   MRN: [id number] : 1950          Preoperative Diagnosis: PAINFUL SHOULDER    Procedure(s):  RIGHT TOTAL SHOULDER ARTHROPLASTY (TORNIER)^    Past Medical History:   Diagnosis Date     Allergic rhinitis, cause unspecified      Fluid overload      Hyperlipidemia      Hypoglycemia, unspecified      Obese      Osteoarthrosis, unspecified whether generalized or localized, unspecified site      Sleep apnea     cpap     Uncomplicated asthma      Unspecified cataract      Vitamin D deficiency      Past Surgical History:   Procedure Laterality Date     APPENDECTOMY       ARTHROPLASTY KNEE Right 2018    Procedure: ARTHROPLASTY KNEE;  RIGHT TOTAL KNEE ARTHROPLASTY    EBL: 5mL;  Surgeon: Ambar Wilson MD;  Location: [facility] OR     BACK SURGERY       C NONSPECIFIC PROCEDURE      Hysterectomy fibroids - bilat S&O     C NONSPECIFIC PROCEDURE      Discectomy L5-S1     C NONSPECIFIC PROCEDURE      Cosmetic below eyes     EYE SURGERY       GYN SURGERY       ORTHOPEDIC SURGERY      dulce. rotator cuffs       Anesthesia Evaluation     . Pt has had prior anesthetic. Type: General    History of anesthetic complications   - PONV        ROS/MED HX    ENT/Pulmonary:     (+)sleep apnea, allergic rhinitis, tobacco use, Past use Moderate Persistent asthma Treatment: Inhaler prn, Inhaled steroids and Oral steroids,  , . .    Neurologic:      (-) seizures and CVA   Cardiovascular:     (+) Dyslipidemia, ----. : . . . :. . Previous cardiac testing date:results:date: results:ECG reviewed date:2019 results:NSR date: results:         (-) CAD, CHF and arrhythmias   METS/Exercise Tolerance:  >4 METS   Hematologic:         Musculoskeletal:         GI/Hepatic:        (-) GERD and liver disease   Renal/Genitourinary:      (-) renal disease   Endo: Comment: BMI 35    (+) Obesity, .   (-) Type I DM and Type II DM   Psychiatric:         Infectious Disease:          Malignancy:         Other:                          Physical Exam  Normal systems: dental    Airway   Mallampati: III  TM distance: >3 FB  Neck ROM: full    Dental     Cardiovascular   Rhythm and rate: regular      Pulmonary    breath sounds clear to auscultation            Lab Results   Component Value Date    WBC 6.1 11/17/2008    HGB 11.3 (L) 01/25/2018    HCT 38.7 11/17/2008     11/17/2008     11/17/2008    POTASSIUM 4.2 11/17/2008    CHLORIDE 105 11/17/2008    CO2 26 11/17/2008    BUN 19 11/17/2008    CR 0.70 01/24/2018     (H) 01/24/2018    MATTI 9.1 11/17/2008    ALBUMIN 4.6 11/17/2008    PROTTOTAL 8.4 11/17/2008    ALT 27 11/17/2008    AST 34 11/17/2008    ALKPHOS 87 11/17/2008    BILITOTAL <0.1 (L) 11/17/2008       Preop Vitals  BP Readings from Last 3 Encounters:   01/25/18 126/73   11/17/12 106/60   11/17/08 124/78    Pulse Readings from Last 3 Encounters:   01/25/18 93   11/17/08 90   07/29/04 88      Resp Readings from Last 3 Encounters:   01/25/18 16   07/29/04 20    SpO2 Readings from Last 3 Encounters:   01/25/18 91%   11/17/08 96%      Temp Readings from Last 1 Encounters:   01/25/18 36.8  C (98.2  F) (Oral)    Ht Readings from Last 1 Encounters:   01/23/18 1.524 m (5')      Wt Readings from Last 1 Encounters:   01/23/18 82.1 kg (180 lb 14.4 oz)    Estimated body mass index is 35.33 kg/m  as calculated from the following:    Height as of 1/23/18: 1.524 m (5').    Weight as of 1/23/18: 82.1 kg (180 lb 14.4 oz).       Anesthesia Plan      History & Physical Review  History and physical reviewed and following examination; no interval change.    ASA Status:  2 .    NPO Status:  > 8 hours    Plan for General and ETT with Intravenous and Propofol induction. Maintenance will be Balanced.    PONV prophylaxis:  Ondansetron (or other 5HT-3) and Dexamethasone or Solumedrol  Additional equipment: Videolaryngoscope Glidescope as backup to DL      Postoperative Care  Postoperative pain  management:  Peripheral nerve block (Single Shot).      Consents  Anesthetic plan, risks, benefits and alternatives discussed with:  Patient..                 Yogi Nash MD

## 2019-06-13 NOTE — BRIEF OP NOTE
[facility]    Brief Operative Note    Pre-operative diagnosis: PAINFUL SHOULDER  Post-operative diagnosis * No post-op diagnosis entered *  Procedure: Procedure(s):  RIGHT TOTAL SHOULDER ARTHROPLASTY REVERSE (TORNIER)^  Surgeon: Surgeon(s) and Role:     * Ambar Wilson MD - Primary     * Ra Prieto MD - Assisting     * Vito Garcia PA-C - Assisting  Anesthesia: General   Estimated blood loss: Less than 100 ml  Drains: None  Specimens: * No specimens in log *  Findings:   None.  Complications: None.  Implants:    Implant Name Type Inv. Item Serial No.  Lot No. LRB No. Used   lateralized baseplate    [id number] TORNIER [employer]  Right 1   central screw 6.5x30     [employer]  Right 1    5.0x38 peripheral screw     [employer]  Right 1   5.0x34 peripheral screw     [employer]  Right 1   5.0x22 peripheral screw    [employer]  Right 1   standard glenpshere    [id number] [employer]  Right 1   reversed insert    [id number] [employer]  Right 1   reversed tray    [id number] [employer]  Right 1   standard ptc humeral stem    [id number] [employer]  Right 1

## 2019-06-13 NOTE — OP NOTE
Procedure Date: 06/13/2019      PREOPERATIVE DIAGNOSIS:  Rotator cuff arthropathy, right shoulder.      POSTOPERATIVE DIAGNOSIS:  Rotator cuff arthropathy, right shoulder.      PROCEDURE:  Right shoulder reverse total shoulder.      INDICATIONS FOR PROCEDURE:  The patient is a 68-year-old female who many years ago had an open rotator cuff repair done of the right shoulder.  She has gone on to have recurrent tear of the rotator cuff and she has developed rotator cuff arthropathy.  I saw the patient in consultation.  She had been getting injection treatment with good short-term relief.  I have explained to her the risks, benefits, potential complications of both continued nonoperative treatment versus operative treatment.  She voiced understanding of this and wanted to proceed.  The discussion included, but was not specific to infection, vascular neurologic complications, DVT, instability of the shoulder, chronic pain, the possible need for further revision surgery.  After this discussion, the patient wanted to proceed with surgery.        The patient had extensive osteophyte development of the glenoid and the humeral side.  Because of the difficult nature of the surgery, it was elected to have 2 surgeons.      PRIMARY SURGEON:  Ambar Wilson MD      ASSISTANTS:     1.  Ra Prieto MD    2.  Vito Garcia PA-C      DESCRIPTION OF PROCEDURE:  The patient was taken to the operating room and placed on the operating table in supine position.  After adequate induction of general anesthetic, he was placed in the beach chair position.  Head and neck were stabilized in neutral fashion.  The patient was given 2 grams of Ancef for infection prophylaxis given 1 hour prior to incision.  We performed a sterile prep and drape of the right shoulder, right upper extremity.  After sterile prep and drape, the old deltopectoral scar was used and we proceeded with a deltopectoral incision extending it both proximally and distally.   I then identified the cephalic vein and retracted it laterally with the deltoid and went through the deltopectoral interval.  She had extensive scarring in this interval and care was taken to release all the scar tissue.  We then identified the biceps tendon and bicipital groove and took down the capsule and the subscapularis in 1 layer.  This extensive scarring involving the capsule was removed using sharp dissection.  I then released the subscapularis superiorly all the way to the coracoid and released it inferiorly all the way to the glenoid.  We then removed the labrum.  There were extensive osteophytes anteriorly, posteriorly, superiorly and inferiorly on the glenoid.  Osteotome was used to remove these osteophytes and there were loose bodies within the subscap recess.  These were also removed.  Care was taken to not damage the axillary nerve.  The capsule was released all the way to the 7 o'clock position.  We then removed the osteophytes off the humerus and osteotomized the humerus and fit the humeral stem.  It was left in place.  A cap was placed on this.  We then directed our attention to the glenoid.  We removed all the osteophytes off the glenoid to establish a true glenoid.  We then used the guide to establish our guidewire position for the glenosphere component.  We then reamed the appropriate depth and we were able to level off the glenoid using the reamer.  We then put in the Glenosphere baseplate, made good fixation with the screw.  We then applied glenosphere baseplate screws superiorly, anteriorly and inferiorly.  This allowed for ideal fixation of the baseplate of the glenosphere.  We then applied the glenosphere.  We then did a trial reduction with the humeral component and we had a good fit.  The shoulder was very stable.  There was good tension on the deltoid and the conjoined tendon.  We then were able to dislocate the shoulder, removed the trial component on the humeral side and removed the  humeral stem and put down the actual humeral stem.  Prior to doing so, we made drill holes in the anterior aspect of the humerus to repair the subscap and passed #5 Ethibond sutures through the drill holes.  Once the humeral component was fully seated, we then applied the polyethylene.  We were then able to reduce the shoulder.  Again, the shoulder was very stable with good tensioning of both the deltoid and the conjoined tendon.  We then irrigated using the pulse jet lavage and soaked the shoulder in a dilute solution of Betadine for 3 minutes and again irrigated using pulse jet lavage.  We then repaired the subscap using #5 Ethibond sutures.  This allowed for rigid repair of the subscap.  We then reapproximated the deltopectoral interval using 0 Vicryl sutures.  The deep subQ was closed using 0 Vicryl, superficial subQ closed with 2-0 Vicryl, skin was closed with a running 3-0 Monocryl subcuticular stitch followed by Prineo dressing.  Sterile dressing was applied followed by a shoulder immobilizer.  The patient tolerated the operative procedure.  There were no intraoperative complications.  The patient went to [facility] in stable condition.  The plan will be for the patient to get 24 hours of Ancef for infection prophylaxis, 30 days of aspirin for DVT prophylaxis.  We will start her on Codmans on postop day #2 and we will see her back in clinic in 2 weeks, at which time we will start passive range of motion of the shoulder.         BREEZY VELÁZQUEZ MD             D: 2019   T: 2019   MT: DIEGO      Name:     LATONIA MARSH   MRN:      [id number]        Account:        [id number]   :      1950           Procedure Date: 2019      Document: [id number]

## 2019-06-13 NOTE — PLAN OF CARE
Pt. Got to the floor at 1430, VSS on 5L NC, cms intact except base line numbness on left pinky and ring fingers, zabala patent, denied any pain, dressing CDI, Will continue to monitor.

## 2019-06-14 ENCOUNTER — APPOINTMENT (OUTPATIENT)
Dept: OCCUPATIONAL THERAPY | Facility: CLINIC | Age: 69
DRG: 483 | End: 2019-06-14
Attending: ORTHOPAEDIC SURGERY
Payer: COMMERCIAL

## 2019-06-14 VITALS
HEART RATE: 90 BPM | WEIGHT: 177 LBS | DIASTOLIC BLOOD PRESSURE: 56 MMHG | OXYGEN SATURATION: 94 % | BODY MASS INDEX: 34.75 KG/M2 | RESPIRATION RATE: 16 BRPM | HEIGHT: 60 IN | TEMPERATURE: 98.2 F | SYSTOLIC BLOOD PRESSURE: 96 MMHG

## 2019-06-14 LAB
CREAT SERPL-MCNC: 0.71 MG/DL (ref 0.52–1.04)
GFR SERPL CREATININE-BSD FRML MDRD: 87 ML/MIN/{1.73_M2}
GLUCOSE SERPL-MCNC: 138 MG/DL (ref 70–99)
HGB BLD-MCNC: 11.3 G/DL (ref 11.7–15.7)

## 2019-06-14 PROCEDURE — A9270 NON-COVERED ITEM OR SERVICE: HCPCS | Performed by: ORTHOPAEDIC SURGERY

## 2019-06-14 PROCEDURE — 97535 SELF CARE MNGMENT TRAINING: CPT | Mod: GO

## 2019-06-14 PROCEDURE — 36415 COLL VENOUS BLD VENIPUNCTURE: CPT | Performed by: ORTHOPAEDIC SURGERY

## 2019-06-14 PROCEDURE — 97110 THERAPEUTIC EXERCISES: CPT | Mod: GO

## 2019-06-14 PROCEDURE — 97165 OT EVAL LOW COMPLEX 30 MIN: CPT | Mod: GO | Performed by: OCCUPATIONAL THERAPIST

## 2019-06-14 PROCEDURE — 85018 HEMOGLOBIN: CPT | Performed by: ORTHOPAEDIC SURGERY

## 2019-06-14 PROCEDURE — 82947 ASSAY GLUCOSE BLOOD QUANT: CPT | Performed by: ORTHOPAEDIC SURGERY

## 2019-06-14 PROCEDURE — 25000132 ZZH RX MED GY IP 250 OP 250 PS 637: Performed by: ORTHOPAEDIC SURGERY

## 2019-06-14 PROCEDURE — 82565 ASSAY OF CREATININE: CPT | Performed by: ORTHOPAEDIC SURGERY

## 2019-06-14 PROCEDURE — 25800030 ZZH RX IP 258 OP 636: Performed by: ORTHOPAEDIC SURGERY

## 2019-06-14 PROCEDURE — 97535 SELF CARE MNGMENT TRAINING: CPT | Mod: GO | Performed by: OCCUPATIONAL THERAPIST

## 2019-06-14 RX ORDER — ASPIRIN 325 MG
325 TABLET, DELAYED RELEASE (ENTERIC COATED) ORAL DAILY
Qty: 30 TABLET | Refills: 0 | Status: ON HOLD | OUTPATIENT
Start: 2019-06-14 | End: 2020-01-07

## 2019-06-14 RX ORDER — OXYCODONE HYDROCHLORIDE 5 MG/1
5-10 TABLET ORAL EVERY 4 HOURS PRN
Qty: 30 TABLET | Refills: 0 | Status: ON HOLD | OUTPATIENT
Start: 2019-06-14 | End: 2020-01-07

## 2019-06-14 RX ADMIN — SENNOSIDES AND DOCUSATE SODIUM 2 TABLET: 8.6; 5 TABLET ORAL at 07:50

## 2019-06-14 RX ADMIN — SODIUM CHLORIDE, POTASSIUM CHLORIDE, SODIUM LACTATE AND CALCIUM CHLORIDE: 600; 310; 30; 20 INJECTION, SOLUTION INTRAVENOUS at 04:31

## 2019-06-14 RX ADMIN — OXYCODONE HYDROCHLORIDE 10 MG: 5 TABLET ORAL at 04:31

## 2019-06-14 RX ADMIN — OXYCODONE HYDROCHLORIDE 10 MG: 5 TABLET ORAL at 11:13

## 2019-06-14 RX ADMIN — ASPIRIN 325 MG: 325 TABLET, DELAYED RELEASE ORAL at 07:49

## 2019-06-14 RX ADMIN — ACETAMINOPHEN 975 MG: 325 TABLET, FILM COATED ORAL at 00:00

## 2019-06-14 RX ADMIN — ACETAMINOPHEN 975 MG: 325 TABLET, FILM COATED ORAL at 07:49

## 2019-06-14 ASSESSMENT — ACTIVITIES OF DAILY LIVING (ADL)
ADLS_ACUITY_SCORE: 13
PREVIOUS_RESPONSIBILITIES: MEAL PREP;HOUSEKEEPING;LAUNDRY;SHOPPING;MEDICATION MANAGEMENT;FINANCES;DRIVING;WORK
ADLS_ACUITY_SCORE: 11

## 2019-06-14 NOTE — PLAN OF CARE
Discharge Planner OT   Patient plan for discharge: Home with spouse assist   Current status: While seated/standing pt completed UE dressing with moderate assist, and required minimum assist  for LE dressing. Pt reported that her spouse will assist as needed. Pt completed RUE exercises for 1 set of 10 reps each exercise.   Barriers to return to prior living situation: current level of assist for I/ADLs   Recommendations for discharge: Home with assist from spouse for ADLs, IADLs and stairs. OP OT or PT for shoulder strengthening and ROM.  Rationale for recommendations: Pt will have spouse assist upon discharge. OP OT or PT for shoulder strengthening and ROM.         Entered by: Wilmer Fong 06/14/2019 2:02 PM            Occupational Therapy Discharge Summary    Reason for therapy discharge:    Discharged to home with outpatient therapy.    Progress towards therapy goal(s). See goals on Care Plan in [facility] electronic health record for goal details.  Goals not met.  Barriers to achieving goals:   discharge from facility.    Therapy recommendation(s):    Home with assist from spouse for ADLs, IADLs and stairs. OP OT or PT for shoulder strengthening and ROM.

## 2019-06-14 NOTE — PROGRESS NOTES
Melissa Templeton  2019  POD # 1 s/p Right reverse TSA  Admit Date:  2019      Doing well.  Objective: no chest pain or shortness of breath. Pain controlled. Wants to go home today.   Blood pressure 96/56, pulse 90, temperature 98.2  F (36.8  C), temperature source Oral, resp. rate 16, height 1.524 m (5'), weight 80.3 kg (177 lb), SpO2 94 %.    Temperatures:  Current - Temp: 98.2  F (36.8  C); Max - Temp  Av.1  F (36.7  C)  Min: 97.5  F (36.4  C)  Max: 99.2  F (37.3  C)  Pulse range: Pulse  Av.3  Min: 87  Max: 93  Blood pressure range: Systolic (24hrs), Av , Min:94 , Max:119   ; Diastolic (24hrs), Av, Min:52, Max:67    Exam:  CMS: intact  alert, stable, wound ok  Dressing c/d/i  Sensation intact to light touch about the lateral deltoid  M/u/r/ nerve distribution intact   strength 5/5  Communicates clearly with examiner    Labs:  Recent Labs   Lab Test 19  0642   POTASSIUM 3.5     Recent Labs   Lab Test 19  0607 18  0640 18  0715   HGB 11.3* 11.3* 11.7     No results for input(s): INR in the last 23407 hours.  No results for input(s): PLT in the last 65317 hours.    PLAN: continue current therapy regimen. ASpirin for dvt ppx. Discharge home today.

## 2019-06-14 NOTE — PROGRESS NOTES
This patient has been seen and evaluated by me, BREEZY VELÁZQUEZ MD. Discussed with the house staff team or resident(s) and agree with the findings and plan in this note.

## 2019-06-14 NOTE — PROGRESS NOTES
06/14/19 0800   Quick Adds   Type of Visit Initial Occupational Therapy Evaluation   Living Environment   Lives With spouse   Living Arrangements house   Home Accessibility stairs to enter home   Number of Stairs, Main Entrance 3   Stair Railings, Main Entrance none   Transportation Anticipated family or friend will provide   Living Environment Comment Pt lives in house with laundry in basement,  does laundry, tub/shower   Functional Level   Ambulation 0-->independent   Transferring 0-->independent   Toileting 0-->independent   Bathing 0-->independent   Dressing 0-->independent   Eating 0-->independent   Communication 0-->understands/communicates without difficulty   Swallowing 0-->swallows foods/liquids without difficulty   Cognition 0 - no cognition issues reported   Fall history within last six months no   Which of the above functional risks had a recent onset or change? 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    General Information   Onset of Illness/Injury or Date of Surgery - Date 06/13/19   Referring Physician Ambar Wilson MD   Patient/Family Goals Statement to return to home and work in the fall   Additional Occupational Profile Info/Pertinent History of Current Problem Pt is a 67 yo female POD 1 reverse R TSA.    Precautions/Limitations fall precautions;oxygen therapy device and L/min   Weight-Bearing Status - RUE nonweight-bearing   General Observations sling on at all times, robert's POD 2, no ROM   General Info Comments Pt on 2L via CPAP, pt states she has been wearing it since surury but usuallyy only wears at night   Cognitive Status Examination   Orientation orientation to person, place and time   Level of Consciousness alert   Visual Perception   Visual Perception Wears glasses   Sensory Examination   Sensory Comments numbness in 4th and 5th fingers on R hand which pt states was  baseline prior to surgury   Pain Assessment   Patient Currently in Pain Yes, see Vital Sign flowsheet   Range of Motion (ROM)   ROM Comment NO ROM to RUE per precautions, LUE WFL for ADLs   Strength   Strength Comments LUE WFL for ADLs, R side not tested   Hand Strength   Hand Strength Comments LUE WFL for ADLs, R side not tested   Coordination   Upper Extremity Coordination No deficits were identified   Mobility   Bed Mobility Comments MOD A   Transfer Skill: Bed to Chair/Chair to Bed   Level of Hitchcock: Bed to Chair contact guard   Physical Assist/Nonphysical Assist: Bed to Chair supervision;verbal cues   Transfer Skill: Sit to Stand   Level of Hitchcock: Sit/Stand stand-by assist   Physical Assist/Nonphysical Assist: Sit/Stand supervision;verbal cues   Transfer Skill: Toilet Transfer   Level of Hitchcock: Toilet contact guard   Physical Assist/Nonphysical Assist: Toilet supervision;verbal cues   Balance   Balance Comments some unsteadiness noted during ambulation, short steps   Upper Body Dressing   Level of Hitchcock: Dress Upper Body moderate assist (50% patients effort)   Lower Body Dressing   Level of Hitchcock: Dress Lower Body moderate assist (50% patients effort)   Toileting   Level of Hitchcock: Toilet minimum assist (75% patients effort)   Grooming   Level of Hitchcock: Grooming minimum assist (75% patients effort)   Eating/Self Feeding   Level of Hitchcock: Eating stand-by assist   Instrumental Activities of Daily Living (IADL)   Previous Responsibilities meal prep;housekeeping;laundry;shopping;medication management;finances;driving;work   Activities of Daily Living Analysis   Impairments Contributing to Impaired Activities of Daily Living pain;post surgical precautions;ROM decreased;sensation decreased;strength decreased   General Therapy Interventions   Planned Therapy Interventions ADL retraining;IADL retraining;bed mobility training;transfer training   Clinical Impression  "  Criteria for Skilled Therapeutic Interventions Met yes, treatment indicated   OT Diagnosis reduced IND In ADLs   Influenced by the following impairments Post surgical precautions,    Assessment of Occupational Performance 3-5 Performance Deficits   Identified Performance Deficits dressing, bathing, toileting, bed mobility,    Clinical Decision Making (Complexity) Low complexity   Therapy Frequency 2x/day   Predicted Duration of Therapy Intervention (days/wks) 2 days   Anticipated Discharge Disposition Home with Outpatient Therapy;Home with Assist   Risks and Benefits of Treatment have been explained. Yes   Patient, Family & other staff in agreement with plan of care Yes   [facility] TM \"6 Clicks\"   2016, Trustees of [facility], under license to [employer].  All rights reserved.   6 Clicks Short Forms Daily Activity Inpatient Short Form   [facility]  \"6 Clicks\" Daily Activity Inpatient Short Form   1. Putting on and taking off regular lower body clothing? 2 - A Lot   2. Bathing (including washing, rinsing, drying)? 2 - A Lot   3. Toileting, which includes using toilet, bedpan or urinal? 3 - A Little   4. Putting on and taking off regular upper body clothing? 2 - A Lot   5. Taking care of personal grooming such as brushing teeth? 3 - A Little   6. Eating meals? 3 - A Little   Daily Activity Raw Score (Score out of 24.Lower scores equate to lower levels of function) 15   Total Evaluation Time   Total Evaluation Time (Minutes) 10     "

## 2019-06-14 NOTE — PLAN OF CARE
Discharge Planner OT   Patient plan for discharge: home with assist from spouse and OP OT/PT  Current status: Orders received, eval completed, treatment initiated. Pt is POD 1 R reverse TSA. Per ortho note NO ROM, codman's to be initiated on POD 2, sling on at all times. Pt completed bed mobility with MIN A for supine to sit, SBA for sit to supine, pt transferred and ambulated with SB-CGA and VC, pt somewhat unsteady at times and reaching out for balance, pt states she has cane available at home if needed. Pt completed toileting with MIN A for clothing management, will need additional support depending on clothing. Pt oriented to sling. Pain at 1/10. Pt on RA throughout session, O2 sats down to 85% at times but recovered to >92% with PLB.   Barriers to return to prior living situation: stairs with no railing  Recommendations for discharge: Home with assist from spouse for ADLs, IADLs and stairs. OP OT or PT for shoulder recovery.  Rationale for recommendations: Anticipate pt will progress and be able to follow recommendations and plan for safe discharge to home with OP OT for continued recovery of RUE.        Entered by: Noni Kang 06/14/2019 9:11 AM

## 2019-06-14 NOTE — PLAN OF CARE
A&Ox4. VSS on 2L NC, except Temp 99.2. On CPAP overnight. Using IS. L/s clear. Denies SOB or Chest pain. Right shoulder dressing CDI. Immobilizer in place to RUE. Radial pulses +2palplable, mild numbness/tingling to R fingers-present before surgery per Pt. Pain managed w/ PRN oxycodone and scheduled Tylenol. +BS. +flatus. Concepcion w/ adequate UOP-removed this AM around 0630. Pt due to void. Up SBA. Plan is to discharge home today. Continue to monitor.

## 2019-06-14 NOTE — DISCHARGE SUMMARY
Orthopedic Discharge Summary   Patient: Melissa Templeton  Admission Date: 6/13/2019  Discharge Date: 6/14/19  Date of Service: 6/14/2019  Attending Provider: Ambar Wilson MD  Admission Diagnosis: PAINFUL SHOULDER  S/p reverse total shoulder arthroplasty  S/p reverse total shoulder arthroplasty  H/O total shoulder replacement  S/p reverse total shoulder arthroplasty  Discharge Diagnosis: right shoulder osteoarthritis  Procedures Performed: right reverse TSA  Complications: None apparent   History of Present Illness: see operative report for full HPI  Past Medical History:   Past Medical History:   Diagnosis Date     Allergic rhinitis, cause unspecified      Fluid overload      Hyperlipidemia      Hypoglycemia, unspecified      Obese      Osteoarthrosis, unspecified whether generalized or localized, unspecified site      Sleep apnea     cpap     Uncomplicated asthma      Unspecified cataract      Vitamin D deficiency      Patient Active Problem List   Diagnosis     Pain in limb     Preop examination     Total knee replacement status     S/p reverse total shoulder arthroplasty     H/O total shoulder replacement     Past Surgical History:   Procedure Laterality Date     APPENDECTOMY       ARTHROPLASTY KNEE Right 1/23/2018    Procedure: ARTHROPLASTY KNEE;  RIGHT TOTAL KNEE ARTHROPLASTY    EBL: 5mL;  Surgeon: Ambar Wilson MD;  Location: [facility] OR     BACK SURGERY       C NONSPECIFIC PROCEDURE      Hysterectomy fibroids - bilat S&O     C NONSPECIFIC PROCEDURE  4-98    Discectomy L5-S1     C NONSPECIFIC PROCEDURE  1990    Cosmetic below eyes     EYE SURGERY       GYN SURGERY       ORTHOPEDIC SURGERY      dulce. rotator cuffs     Social History     Socioeconomic History     Marital status:      Spouse name: Not on file     Number of children: Not on file     Years of education: Not on file     Highest education level: Not on file   Occupational History     Not on file   Social Needs     Financial resource  strain: Not on file     Food insecurity:     Worry: Not on file     Inability: Not on file     Transportation needs:     Medical: Not on file     Non-medical: Not on file   Tobacco Use     Smoking status: Former Smoker     Years: 10.00     Last attempt to quit:      Years since quittin.4     Smokeless tobacco: Never Used   Substance and Sexual Activity     Alcohol use: Yes     Comment: OCCASIONALLY--2/month     Drug use: Never     Sexual activity: Not on file   Lifestyle     Physical activity:     Days per week: Not on file     Minutes per session: Not on file     Stress: Not on file   Relationships     Social connections:     Talks on phone: Not on file     Gets together: Not on file     Attends Druze service: Not on file     Active member of club or organization: Not on file     Attends meetings of clubs or organizations: Not on file     Relationship status: Not on file     Intimate partner violence:     Fear of current or ex partner: Not on file     Emotionally abused: Not on file     Physically abused: Not on file     Forced sexual activity: Not on file   Other Topics Concern     Parent/sibling w/ CABG, MI or angioplasty before 65F 55M? Not Asked   Social History Narrative     Not on file     Medications on admission:   Medications Prior to Admission   Medication Sig Dispense Refill Last Dose     Acetaminophen (TYLENOL PO) Take 650 mg by mouth 4 times daily as needed for mild pain or fever   Past Month at Unknown time     albuterol (PROAIR HFA/PROVENTIL HFA/VENTOLIN HFA) 108 (90 BASE) MCG/ACT Inhaler Inhale 2 puffs into the lungs every 6 hours as needed for shortness of breath / dyspnea or wheezing   Past Week at Unknown time     Calcium-Magnesium-Vitamin D (CITRACAL SLOW RELEASE) 600- MG-MG-UNIT TB24 Take 1 tablet by mouth 2 times daily   2019 at Unknown time     FIBER ADULT GUMMIES PO Take 2 chew tab by mouth daily (FIBERWELL)   Past Week at Unknown time     fluticasone (FLONASE) 50  MCG/ACT spray Spray 2 sprays into both nostrils daily   6/12/2019 at Unknown time     fluticasone-vilanterol (BREO ELLIPTA) 100-25 MCG/INH inhaler Inhale 1 puff into the lungs daily    6/12/2019 at Unknown time     folic acid (FOLVITE) 400 MCG tablet Take 400 mcg by mouth daily   6/12/2019 at Unknown time     GuaiFENesin (MUCINEX PO) Take 1,200 mg by mouth daily   6/12/2019 at Unknown time     LORATADINE PO Take 10 mg by mouth daily   6/12/2019 at Unknown time     magnesium oxide 400 MG CAPS Take 400 mg by mouth every evening   6/12/2019 at Unknown time     meloxicam (MOBIC) 15 MG tablet Take 15 mg by mouth every evening   Past Month at Unknown time     Misc Natural Products (GLUCOS-CHONDROIT-MSM COMPLEX PO) Take 1 tablet by mouth every evening   6/12/2019 at Unknown time     mometasone (NASONEX) 50 MCG/ACT nasal spray Spray 2 sprays into both nostrils daily   6/12/2019 at Unknown time     Montelukast Sodium (SINGULAIR PO) Take 10 mg by mouth daily   6/12/2019 at Unknown time     Multiple Vitamins-Minerals (PRESERVISION AREDS PO) Take 1 tablet by mouth 2 times daily   6/12/2019 at Unknown time     Multiple Vitamins-Minerals (PRESERVISION/LUTEIN PO) Take 1 tablet by mouth 2 times daily   6/12/2019 at Unknown time     Omega-3 Fatty Acids (FISH OIL PO) Take 1,200 mg by mouth daily   Past Month at Unknown time     Prenatal Vit-Fe Fumarate-FA (PRENATAL 19) 29-1 MG CHEW Take 1 chew tab by mouth 2 times daily   6/12/2019 at Unknown time     Probiotic Product (PROBIOTIC PO) Take 1 tablet by mouth every evening   Past Week at Unknown time     pseudoePHEDrine (SUDOGEST 12 HOUR) 120 MG 12 hr tablet Take 120 mg by mouth every evening   6/12/2019 at Unknown time     triamterene-HCTZ (DYAZIDE) 37.5-25 MG capsule Take 1 capsule by mouth every morning   6/12/2019 at Unknown time     VITAMIN D, CHOLECALCIFEROL, PO Take 2,000 Units by mouth Every Mon, Wed, Fri Morning    6/12/2019 at Unknown time     diclofenac (VOLTAREN) 1 % GEL  topical gel Place onto the skin 3 times daily as needed for moderate pain   1/21/2018 at prn     hydrOXYzine (ATARAX) 10 MG tablet Take 1 tablet (10 mg) by mouth every 6 hours as needed for itching 40 tablet 0 Unknown at Unknown time     lidocaine (LIDODERM) 5 % Patch Place 1 patch onto the skin daily as needed for moderate pain   More than a month at Unknown time     Misc Natural Products (NARCOSOFT HERBAL LAX PO) Take 1-2 capsules by mouth daily (HERB-LAX)   More than a month at Unknown time     ondansetron (ZOFRAN-ODT) 4 MG ODT tab Take 1 tablet (4 mg) by mouth every 6 hours as needed for nausea or vomiting 40 tablet 0 More than a month at Unknown time     order for DME Equipment being ordered: Walker Wheels () and Walker ()  Treatment Diagnosis: difficulty with gait 1 each 0      ORDER FOR DME Equipment being ordered: rt short cam walker 1 each 0      PREDNISONE PO Take 20 mg by mouth daily as needed (to clear lungs)   More than a month at Unknown time     senna-docusate (SENOKOT-S;PERICOLACE) 8.6-50 MG per tablet Take 1-2 tablets by mouth 2 times daily 40 tablet 0 More than a month at Unknown time     [DISCONTINUED] aspirin 81 MG EC tablet Take 81 mg by mouth daily   Past Month at Unknown time     [DISCONTINUED] oxyCODONE IR (ROXICODONE) 5 MG tablet Take 1-2 tablets (5-10 mg) by mouth every 3 hours as needed for moderate to severe pain 30 tablet 0 More than a month at Unknown time     Allergies:    Allergies   Allergen Reactions     Magnesium Citrate Shortness Of Breath and Other (See Comments)     And CONFUSION     Erythromycin      nausea     Hmg-Coa-R Inhibitors Muscle Pain (Myalgia)       Hospital Course: Patient was admitted to Orthopedics and brought to the OR on where she underwent the above named procedure. Postoperatively she did very well with no apparent complications, and she had an uneventful hospital stay. Ancef was given for 24 hours after surgery. She was given aspirin for DVT  prophylaxis and her pain was well controlled with oral medications, then transitioned to oral pain medications during her hospital stay. She progressed well with physical therapy and discharge to home was recommended. Her chronic medical problems were followed by the medicine team during her hospital stay and there were no significant changes to conditions or treatment plans. No new medical problems presented during her hospital stay.   Consultations Obtained During Hospitalization:  1. Internal medicine for management of comorbid conditions and home medication management.  2. Physical Therapy for gait training, mobilization, range of motion and strengthening exercises  3. Occupational Therapy for activities of daily living.  4. Social Work for disposition planning.  Active Problems:    S/p reverse total shoulder arthroplasty    H/O total shoulder replacement    Discharge Disposition: patient improving  Discharge Diet: resume normal pre op diet  Discharge Medications:   Current Discharge Medication List      CONTINUE these medications which have CHANGED    Details   aspirin (ASA) 325 MG EC tablet Take 1 tablet (325 mg) by mouth daily  Qty: 30 tablet, Refills: 0    Associated Diagnoses: Status post reverse total replacement of right shoulder      oxyCODONE (ROXICODONE) 5 MG tablet Take 1-2 tablets (5-10 mg) by mouth every 4 hours as needed for breakthrough pain  Qty: 30 tablet, Refills: 0    Associated Diagnoses: Status post reverse total replacement of right shoulder         CONTINUE these medications which have NOT CHANGED    Details   Acetaminophen (TYLENOL PO) Take 650 mg by mouth 4 times daily as needed for mild pain or fever      albuterol (PROAIR HFA/PROVENTIL HFA/VENTOLIN HFA) 108 (90 BASE) MCG/ACT Inhaler Inhale 2 puffs into the lungs every 6 hours as needed for shortness of breath / dyspnea or wheezing      Calcium-Magnesium-Vitamin D (CITRACAL SLOW RELEASE) 600- MG-MG-UNIT TB24 Take 1 tablet by mouth  2 times daily      FIBER ADULT GUMMIES PO Take 2 chew tab by mouth daily (FIBERWELL)      fluticasone (FLONASE) 50 MCG/ACT spray Spray 2 sprays into both nostrils daily      fluticasone-vilanterol (BREO ELLIPTA) 100-25 MCG/INH inhaler Inhale 1 puff into the lungs daily       folic acid (FOLVITE) 400 MCG tablet Take 400 mcg by mouth daily      GuaiFENesin (MUCINEX PO) Take 1,200 mg by mouth daily      LORATADINE PO Take 10 mg by mouth daily      magnesium oxide 400 MG CAPS Take 400 mg by mouth every evening      meloxicam (MOBIC) 15 MG tablet Take 15 mg by mouth every evening      Misc Natural Products (GLUCOS-CHONDROIT-MSM COMPLEX PO) Take 1 tablet by mouth every evening      mometasone (NASONEX) 50 MCG/ACT nasal spray Spray 2 sprays into both nostrils daily      Montelukast Sodium (SINGULAIR PO) Take 10 mg by mouth daily      !! Multiple Vitamins-Minerals (PRESERVISION AREDS PO) Take 1 tablet by mouth 2 times daily      !! Multiple Vitamins-Minerals (PRESERVISION/LUTEIN PO) Take 1 tablet by mouth 2 times daily      Omega-3 Fatty Acids (FISH OIL PO) Take 1,200 mg by mouth daily      Prenatal Vit-Fe Fumarate-FA (PRENATAL 19) 29-1 MG CHEW Take 1 chew tab by mouth 2 times daily      Probiotic Product (PROBIOTIC PO) Take 1 tablet by mouth every evening      pseudoePHEDrine (SUDOGEST 12 HOUR) 120 MG 12 hr tablet Take 120 mg by mouth every evening      triamterene-HCTZ (DYAZIDE) 37.5-25 MG capsule Take 1 capsule by mouth every morning      VITAMIN D, CHOLECALCIFEROL, PO Take 2,000 Units by mouth Every Mon, Wed, Fri Morning       diclofenac (VOLTAREN) 1 % GEL topical gel Place onto the skin 3 times daily as needed for moderate pain      hydrOXYzine (ATARAX) 10 MG tablet Take 1 tablet (10 mg) by mouth every 6 hours as needed for itching  Qty: 40 tablet, Refills: 0    Associated Diagnoses: Status post total right knee replacement      lidocaine (LIDODERM) 5 % Patch Place 1 patch onto the skin daily as needed for moderate  pain      Misc Natural Products (NARCOSOFT HERBAL LAX PO) Take 1-2 capsules by mouth daily (HERB-LAX)      ondansetron (ZOFRAN-ODT) 4 MG ODT tab Take 1 tablet (4 mg) by mouth every 6 hours as needed for nausea or vomiting  Qty: 40 tablet, Refills: 0    Associated Diagnoses: Status post total right knee replacement      !! order for DME Equipment being ordered: Walker Wheels () and Walker ()  Treatment Diagnosis: difficulty with gait  Qty: 1 each, Refills: 0    Associated Diagnoses: Status post total right knee replacement      !! ORDER FOR DME Equipment being ordered: rt short cam walker  Qty: 1 each, Refills: 0    Associated Diagnoses: Metatarsal fracture      PREDNISONE PO Take 20 mg by mouth daily as needed (to clear lungs)      senna-docusate (SENOKOT-S;PERICOLACE) 8.6-50 MG per tablet Take 1-2 tablets by mouth 2 times daily  Qty: 40 tablet, Refills: 0    Associated Diagnoses: Status post total right knee replacement       !! - Potential duplicate medications found. Please discuss with provider.        Code Status:   X-rays needed at the follow up visit:   Vito Garcia PA-C  6/14/2019 7:36 AM   [place]  [place]  [phone]  Please fax copy to Sonal at [facility]

## 2019-06-14 NOTE — PLAN OF CARE
PRN Oxycodone with scheduled Tylenol given for pain control, up with SBA and gait belt, voiding in bathroom, SL discontinued, dressing changed, discharge instructions reviewed with patient, and patient discharged home with spouse assist.

## 2019-06-14 NOTE — DISCHARGE INSTRUCTIONS
Discharge instructions following shoulder surgery    Activity Level  1. Physical activity may be resumed gradually according to your comfort level and your surgeons instructions.  2. If you have a shoulder immobilizer on, continue to use it as instructed by your surgeon or nurse. If you have a sling on, ask your doctor when and if it may be removed.  3. Follow your exercise program as instructed by your therapist, if you have any questions about your restrictions please contact your surgeon's office.   4. Continue to bend and move your fingers and hands. This will help prevent swelling and numbness and also help to improve circulation to that arm.  5. It is helpful to ice your shoulder 3-4 times per day to decrease swelling and discomfort.      Good Health Practices:  1. Maintain an adequate fluid intake and eat a well balanced diet to promote healing.   2. Surgery, decreased activity and pain medication all contribute to a decrease in bowel activity that can result in constipation. It is recommended that you increase your fluid intake, add fiber to your diet, and add an over the counter laxative to your daily schedule if you experience these problems.  3. Change your dressing everyday until seen by your doctor. The incision needs to be kept dry during showers, we recommend securing a bag over your dressing and then change the dressing after your bath or shower.   4. Keep the skin under your armpit and around your incision clean and dry. Check your skin daily under your arm to prevent skin irritation.   5.  You may use powder under arms but keep away from incision. May use roll on deodorant or stick.     Things to watch for:  1. Check your incision daily for increased redness, swelling, or drainage along the incision line. If these occur, please notify your doctor. Also, call if you develop a fever above 101 degrees.  2. Report any numbness, tingling, weakness, or loss of sensation in your hand or arm to your  doctor.    Follow Up:  If you have stitches or staples in your incision you should follow up with your doctor in 7-10 days. Call _______________ to schedule your appointment.    Revised 01/2014

## 2019-06-14 NOTE — PLAN OF CARE
Pt is A&O x4. O2 on 2LNC. Slight numbness on R shoulder. Immobilizer in place on RUE. Pain well managed w/ oxycodone and ice. Ambulated in hallway w/ one assist, tolerated well. Concepcion patent w/ adequate output. IVF infusing. Denies chest pain or SOB. Will continue to monitor.

## 2019-09-23 LAB
ALT SERPL-CCNC: 29 IU/L (ref 8–45)
AST SERPL-CCNC: 35 IU/L (ref 2–40)
CREAT SERPL-MCNC: 0.74 MG/DL (ref 0.57–1.11)
GFR SERPL CREATININE-BSD FRML MDRD: >60 ML/MIN/1.73M2
GLUCOSE SERPL-MCNC: 106 MG/DL (ref 65–100)
POTASSIUM SERPL-SCNC: 4 MMOL/L (ref 3.5–5)

## 2019-10-01 ENCOUNTER — HEALTH MAINTENANCE LETTER (OUTPATIENT)
Age: 69
End: 2019-10-01

## 2019-12-15 ENCOUNTER — HEALTH MAINTENANCE LETTER (OUTPATIENT)
Age: 69
End: 2019-12-15

## 2019-12-30 ENCOUNTER — TRANSFERRED RECORDS (OUTPATIENT)
Dept: HEALTH INFORMATION MANAGEMENT | Facility: CLINIC | Age: 69
End: 2019-12-30

## 2019-12-30 LAB
CREAT SERPL-MCNC: 0.71 MG/DL (ref 0.57–1.11)
GFR SERPL CREATININE-BSD FRML MDRD: >60 ML/MIN/1.73M2
GLUCOSE SERPL-MCNC: 119 MG/DL (ref 65–100)
HBA1C MFR BLD: 6.5 % (ref 0–5.7)
POTASSIUM SERPL-SCNC: 3.9 MMOL/L (ref 3.5–5)

## 2020-01-06 ENCOUNTER — ANESTHESIA EVENT (OUTPATIENT)
Dept: SURGERY | Facility: CLINIC | Age: 70
DRG: 455 | End: 2020-01-06
Payer: COMMERCIAL

## 2020-01-07 ENCOUNTER — APPOINTMENT (OUTPATIENT)
Dept: GENERAL RADIOLOGY | Facility: CLINIC | Age: 70
DRG: 455 | End: 2020-01-07
Attending: ORTHOPAEDIC SURGERY
Payer: COMMERCIAL

## 2020-01-07 ENCOUNTER — HOSPITAL ENCOUNTER (INPATIENT)
Facility: CLINIC | Age: 70
LOS: 3 days | Discharge: NURSING, REHAB OR LONG-TERM CARE | DRG: 455 | End: 2020-01-10
Attending: ORTHOPAEDIC SURGERY | Admitting: ORTHOPAEDIC SURGERY
Payer: COMMERCIAL

## 2020-01-07 ENCOUNTER — ANESTHESIA (OUTPATIENT)
Dept: SURGERY | Facility: CLINIC | Age: 70
DRG: 455 | End: 2020-01-07
Payer: COMMERCIAL

## 2020-01-07 DIAGNOSIS — M48.062 SPINAL STENOSIS OF LUMBAR REGION WITH NEUROGENIC CLAUDICATION: Primary | ICD-10-CM

## 2020-01-07 LAB
ABO + RH BLD: NORMAL
ABO + RH BLD: NORMAL
BLD GP AB SCN SERPL QL: NORMAL
BLOOD BANK CMNT PATIENT-IMP: NORMAL
DEPRECATED CALCIDIOL+CALCIFEROL SERPL-MC: 49 UG/L (ref 20–75)
GLUCOSE BLDC GLUCOMTR-MCNC: 128 MG/DL (ref 70–99)
POTASSIUM SERPL-SCNC: 3.3 MMOL/L (ref 3.4–5.3)
SPECIMEN EXP DATE BLD: NORMAL

## 2020-01-07 PROCEDURE — 0SG1071 FUSION OF 2 OR MORE LUMBAR VERTEBRAL JOINTS WITH AUTOLOGOUS TISSUE SUBSTITUTE, POSTERIOR APPROACH, POSTERIOR COLUMN, OPEN APPROACH: ICD-10-PCS | Performed by: ORTHOPAEDIC SURGERY

## 2020-01-07 PROCEDURE — 72100 X-RAY EXAM L-S SPINE 2/3 VWS: CPT

## 2020-01-07 PROCEDURE — 99207 ZZC CONSULT E&M CHANGED TO INITIAL LEVEL: CPT | Performed by: PHYSICIAN ASSISTANT

## 2020-01-07 PROCEDURE — 40000170 ZZH STATISTIC PRE-PROCEDURE ASSESSMENT II: Performed by: ORTHOPAEDIC SURGERY

## 2020-01-07 PROCEDURE — 25000125 ZZHC RX 250: Performed by: ORTHOPAEDIC SURGERY

## 2020-01-07 PROCEDURE — C1713 ANCHOR/SCREW BN/BN,TIS/BN: HCPCS | Performed by: ORTHOPAEDIC SURGERY

## 2020-01-07 PROCEDURE — 25000132 ZZH RX MED GY IP 250 OP 250 PS 637: Performed by: ORTHOPAEDIC SURGERY

## 2020-01-07 PROCEDURE — 25000128 H RX IP 250 OP 636

## 2020-01-07 PROCEDURE — 86850 RBC ANTIBODY SCREEN: CPT | Performed by: ORTHOPAEDIC SURGERY

## 2020-01-07 PROCEDURE — 99222 1ST HOSP IP/OBS MODERATE 55: CPT | Performed by: PHYSICIAN ASSISTANT

## 2020-01-07 PROCEDURE — 84132 ASSAY OF SERUM POTASSIUM: CPT | Performed by: ORTHOPAEDIC SURGERY

## 2020-01-07 PROCEDURE — 37000008 ZZH ANESTHESIA TECHNICAL FEE, 1ST 30 MIN: Performed by: ORTHOPAEDIC SURGERY

## 2020-01-07 PROCEDURE — 12000000 ZZH R&B MED SURG/OB

## 2020-01-07 PROCEDURE — 25800030 ZZH RX IP 258 OP 636: Performed by: NURSE ANESTHETIST, CERTIFIED REGISTERED

## 2020-01-07 PROCEDURE — 25000125 ZZHC RX 250

## 2020-01-07 PROCEDURE — 27210794 ZZH OR GENERAL SUPPLY STERILE: Performed by: ORTHOPAEDIC SURGERY

## 2020-01-07 PROCEDURE — 0SG3071 FUSION OF LUMBOSACRAL JOINT WITH AUTOLOGOUS TISSUE SUBSTITUTE, POSTERIOR APPROACH, POSTERIOR COLUMN, OPEN APPROACH: ICD-10-PCS | Performed by: ORTHOPAEDIC SURGERY

## 2020-01-07 PROCEDURE — 71000013 ZZH RECOVERY PHASE 1 LEVEL 1 EA ADDTL HR: Performed by: ORTHOPAEDIC SURGERY

## 2020-01-07 PROCEDURE — 86901 BLOOD TYPING SEROLOGIC RH(D): CPT | Performed by: ORTHOPAEDIC SURGERY

## 2020-01-07 PROCEDURE — 25000132 ZZH RX MED GY IP 250 OP 250 PS 637: Performed by: PHYSICIAN ASSISTANT

## 2020-01-07 PROCEDURE — 0SG30AJ FUSION OF LUMBOSACRAL JOINT WITH INTERBODY FUSION DEVICE, POSTERIOR APPROACH, ANTERIOR COLUMN, OPEN APPROACH: ICD-10-PCS | Performed by: ORTHOPAEDIC SURGERY

## 2020-01-07 PROCEDURE — 25000128 H RX IP 250 OP 636: Performed by: NURSE ANESTHETIST, CERTIFIED REGISTERED

## 2020-01-07 PROCEDURE — 25000125 ZZHC RX 250: Performed by: NURSE ANESTHETIST, CERTIFIED REGISTERED

## 2020-01-07 PROCEDURE — 25800030 ZZH RX IP 258 OP 636: Performed by: ANESTHESIOLOGY

## 2020-01-07 PROCEDURE — 25000128 H RX IP 250 OP 636: Performed by: ORTHOPAEDIC SURGERY

## 2020-01-07 PROCEDURE — 82306 VITAMIN D 25 HYDROXY: CPT | Performed by: ORTHOPAEDIC SURGERY

## 2020-01-07 PROCEDURE — C1762 CONN TISS, HUMAN(INC FASCIA): HCPCS | Performed by: ORTHOPAEDIC SURGERY

## 2020-01-07 PROCEDURE — 71000012 ZZH RECOVERY PHASE 1 LEVEL 1 FIRST HR: Performed by: ORTHOPAEDIC SURGERY

## 2020-01-07 PROCEDURE — 25000566 ZZH SEVOFLURANE, EA 15 MIN: Performed by: ORTHOPAEDIC SURGERY

## 2020-01-07 PROCEDURE — 00000146 ZZHCL STATISTIC GLUCOSE BY METER IP

## 2020-01-07 PROCEDURE — 36000069 ZZH SURGERY LEVEL 5 EA 15 ADDTL MIN: Performed by: ORTHOPAEDIC SURGERY

## 2020-01-07 PROCEDURE — 01NB0ZZ RELEASE LUMBAR NERVE, OPEN APPROACH: ICD-10-PCS | Performed by: ORTHOPAEDIC SURGERY

## 2020-01-07 PROCEDURE — 25800030 ZZH RX IP 258 OP 636: Performed by: ORTHOPAEDIC SURGERY

## 2020-01-07 PROCEDURE — 40000985 XR LUMBAR SPINE PORT 1 VW

## 2020-01-07 PROCEDURE — 25000301 ZZH OR RX SURGIFLO W/THROMBIN KIT 2ML 1991 OPNP: Performed by: ORTHOPAEDIC SURGERY

## 2020-01-07 PROCEDURE — 25800030 ZZH RX IP 258 OP 636

## 2020-01-07 PROCEDURE — 0SG10AJ FUSION OF 2 OR MORE LUMBAR VERTEBRAL JOINTS WITH INTERBODY FUSION DEVICE, POSTERIOR APPROACH, ANTERIOR COLUMN, OPEN APPROACH: ICD-10-PCS | Performed by: ORTHOPAEDIC SURGERY

## 2020-01-07 PROCEDURE — 25000125 ZZHC RX 250: Performed by: ANESTHESIOLOGY

## 2020-01-07 PROCEDURE — 37000009 ZZH ANESTHESIA TECHNICAL FEE, EACH ADDTL 15 MIN: Performed by: ORTHOPAEDIC SURGERY

## 2020-01-07 PROCEDURE — 25000132 ZZH RX MED GY IP 250 OP 250 PS 637

## 2020-01-07 PROCEDURE — 0SB20ZZ EXCISION OF LUMBAR VERTEBRAL DISC, OPEN APPROACH: ICD-10-PCS | Performed by: ORTHOPAEDIC SURGERY

## 2020-01-07 PROCEDURE — 36000067 ZZH SURGERY LEVEL 5 1ST 30 MIN: Performed by: ORTHOPAEDIC SURGERY

## 2020-01-07 PROCEDURE — 86900 BLOOD TYPING SEROLOGIC ABO: CPT | Performed by: ORTHOPAEDIC SURGERY

## 2020-01-07 PROCEDURE — P9041 ALBUMIN (HUMAN),5%, 50ML: HCPCS

## 2020-01-07 PROCEDURE — C1776 JOINT DEVICE (IMPLANTABLE): HCPCS | Performed by: ORTHOPAEDIC SURGERY

## 2020-01-07 PROCEDURE — 40000985 XR LUMBAR SPINE PORT 2-3VW

## 2020-01-07 PROCEDURE — 36415 COLL VENOUS BLD VENIPUNCTURE: CPT | Performed by: ORTHOPAEDIC SURGERY

## 2020-01-07 PROCEDURE — A9270 NON-COVERED ITEM OR SERVICE: HCPCS

## 2020-01-07 DEVICE — GRAFT BONE MIX DBM 10ML: Type: IMPLANTABLE DEVICE | Site: SPINE LUMBAR | Status: FUNCTIONAL

## 2020-01-07 DEVICE — IMP SCR SPINE EVEREST PEDICLE 6.5X45MM TI THRD 2911-06545: Type: IMPLANTABLE DEVICE | Site: SPINE LUMBAR | Status: FUNCTIONAL

## 2020-01-07 DEVICE — IMP SCR SPINE EVEREST PEDICLE 6.5X50MM TI THRD 2911-06550: Type: IMPLANTABLE DEVICE | Site: SPINE LUMBAR | Status: FUNCTIONAL

## 2020-01-07 DEVICE — IMPLANTABLE DEVICE: Type: IMPLANTABLE DEVICE | Site: SPINE LUMBAR | Status: FUNCTIONAL

## 2020-01-07 DEVICE — IMP SCR SET BONE EVEREST PA TI 2901-10001: Type: IMPLANTABLE DEVICE | Site: SPINE LUMBAR | Status: FUNCTIONAL

## 2020-01-07 RX ORDER — HYDROMORPHONE HYDROCHLORIDE 1 MG/ML
.3-.5 INJECTION, SOLUTION INTRAMUSCULAR; INTRAVENOUS; SUBCUTANEOUS
Status: DISCONTINUED | OUTPATIENT
Start: 2020-01-07 | End: 2020-01-10 | Stop reason: HOSPADM

## 2020-01-07 RX ORDER — ACETAMINOPHEN 500 MG
2 TABLET ORAL 4 TIMES DAILY
Status: ON HOLD | COMMUNITY
End: 2023-12-21

## 2020-01-07 RX ORDER — MONTELUKAST SODIUM 10 MG/1
10 TABLET ORAL EVERY MORNING
Status: DISCONTINUED | OUTPATIENT
Start: 2020-01-08 | End: 2020-01-10 | Stop reason: HOSPADM

## 2020-01-07 RX ORDER — SCOLOPAMINE TRANSDERMAL SYSTEM 1 MG/1
1 PATCH, EXTENDED RELEASE TRANSDERMAL ONCE
Status: COMPLETED | OUTPATIENT
Start: 2020-01-07 | End: 2020-01-10

## 2020-01-07 RX ORDER — TRIAMTERENE AND HYDROCHLOROTHIAZIDE 37.5; 25 MG/1; MG/1
1 CAPSULE ORAL EVERY MORNING
Status: DISCONTINUED | OUTPATIENT
Start: 2020-01-08 | End: 2020-01-07

## 2020-01-07 RX ORDER — PSEUDOEPHEDRINE HCL 120 MG/1
120 TABLET, FILM COATED, EXTENDED RELEASE ORAL AT BEDTIME
Status: DISCONTINUED | OUTPATIENT
Start: 2020-01-07 | End: 2020-01-07

## 2020-01-07 RX ORDER — PROPOFOL 10 MG/ML
INJECTION, EMULSION INTRAVENOUS PRN
Status: DISCONTINUED | OUTPATIENT
Start: 2020-01-07 | End: 2020-01-07

## 2020-01-07 RX ORDER — POTASSIUM CL/LIDO/0.9 % NACL 10MEQ/0.1L
10 INTRAVENOUS SOLUTION, PIGGYBACK (ML) INTRAVENOUS
Status: DISCONTINUED | OUTPATIENT
Start: 2020-01-07 | End: 2020-01-10 | Stop reason: HOSPADM

## 2020-01-07 RX ORDER — LIDOCAINE HYDROCHLORIDE 20 MG/ML
INJECTION, SOLUTION INFILTRATION; PERINEURAL PRN
Status: DISCONTINUED | OUTPATIENT
Start: 2020-01-07 | End: 2020-01-07

## 2020-01-07 RX ORDER — CHLORAL HYDRATE 500 MG
1000 CAPSULE ORAL DAILY
Status: DISCONTINUED | OUTPATIENT
Start: 2020-01-07 | End: 2020-01-07

## 2020-01-07 RX ORDER — ONDANSETRON 2 MG/ML
4 INJECTION INTRAMUSCULAR; INTRAVENOUS EVERY 6 HOURS PRN
Status: DISCONTINUED | OUTPATIENT
Start: 2020-01-07 | End: 2020-01-10 | Stop reason: HOSPADM

## 2020-01-07 RX ORDER — CEFAZOLIN SODIUM 1 G/3ML
1 INJECTION, POWDER, FOR SOLUTION INTRAMUSCULAR; INTRAVENOUS SEE ADMIN INSTRUCTIONS
Status: DISCONTINUED | OUTPATIENT
Start: 2020-01-07 | End: 2020-01-07 | Stop reason: HOSPADM

## 2020-01-07 RX ORDER — POTASSIUM CHLORIDE 1.5 G/1.58G
20-40 POWDER, FOR SOLUTION ORAL
Status: DISCONTINUED | OUTPATIENT
Start: 2020-01-07 | End: 2020-01-10 | Stop reason: HOSPADM

## 2020-01-07 RX ORDER — ACETAMINOPHEN 325 MG/1
975 TABLET ORAL ONCE
Status: DISCONTINUED | OUTPATIENT
Start: 2020-01-07 | End: 2020-01-07 | Stop reason: HOSPADM

## 2020-01-07 RX ORDER — AMOXICILLIN 250 MG
1-2 CAPSULE ORAL 2 TIMES DAILY
Status: DISCONTINUED | OUTPATIENT
Start: 2020-01-07 | End: 2020-01-10 | Stop reason: HOSPADM

## 2020-01-07 RX ORDER — BUPIVACAINE HYDROCHLORIDE AND EPINEPHRINE 2.5; 5 MG/ML; UG/ML
INJECTION, SOLUTION EPIDURAL; INFILTRATION; INTRACAUDAL; PERINEURAL PRN
Status: DISCONTINUED | OUTPATIENT
Start: 2020-01-07 | End: 2020-01-07 | Stop reason: HOSPADM

## 2020-01-07 RX ORDER — POLYETHYLENE GLYCOL 3350 17 G/17G
17 POWDER, FOR SOLUTION ORAL DAILY
Status: DISCONTINUED | OUTPATIENT
Start: 2020-01-07 | End: 2020-01-10 | Stop reason: HOSPADM

## 2020-01-07 RX ORDER — CEFAZOLIN SODIUM 1 G/3ML
INJECTION, POWDER, FOR SOLUTION INTRAMUSCULAR; INTRAVENOUS PRN
Status: DISCONTINUED | OUTPATIENT
Start: 2020-01-07 | End: 2020-01-07

## 2020-01-07 RX ORDER — ALBUTEROL SULFATE 0.83 MG/ML
2.5 SOLUTION RESPIRATORY (INHALATION)
Status: DISCONTINUED | OUTPATIENT
Start: 2020-01-07 | End: 2020-01-10 | Stop reason: HOSPADM

## 2020-01-07 RX ORDER — METOCLOPRAMIDE HYDROCHLORIDE 5 MG/ML
5 INJECTION INTRAMUSCULAR; INTRAVENOUS EVERY 6 HOURS PRN
Status: DISCONTINUED | OUTPATIENT
Start: 2020-01-07 | End: 2020-01-10 | Stop reason: HOSPADM

## 2020-01-07 RX ORDER — GUAIFENESIN AND DEXTROMETHORPHAN HYDROBROMIDE 600; 30 MG/1; MG/1
1 TABLET, EXTENDED RELEASE ORAL EVERY MORNING
COMMUNITY
End: 2021-02-08

## 2020-01-07 RX ORDER — KETAMINE HYDROCHLORIDE 10 MG/ML
INJECTION, SOLUTION INTRAMUSCULAR; INTRAVENOUS PRN
Status: DISCONTINUED | OUTPATIENT
Start: 2020-01-07 | End: 2020-01-07

## 2020-01-07 RX ORDER — DIPHENHYDRAMINE HYDROCHLORIDE 50 MG/ML
12.5 INJECTION INTRAMUSCULAR; INTRAVENOUS EVERY 6 HOURS PRN
Status: DISCONTINUED | OUTPATIENT
Start: 2020-01-07 | End: 2020-01-10 | Stop reason: HOSPADM

## 2020-01-07 RX ORDER — ALBUMIN, HUMAN INJ 5% 5 %
SOLUTION INTRAVENOUS CONTINUOUS PRN
Status: DISCONTINUED | OUTPATIENT
Start: 2020-01-07 | End: 2020-01-07

## 2020-01-07 RX ORDER — FENTANYL CITRATE 50 UG/ML
25-50 INJECTION, SOLUTION INTRAMUSCULAR; INTRAVENOUS
Status: DISCONTINUED | OUTPATIENT
Start: 2020-01-07 | End: 2020-01-07 | Stop reason: HOSPADM

## 2020-01-07 RX ORDER — SODIUM CHLORIDE AND POTASSIUM CHLORIDE 150; 900 MG/100ML; MG/100ML
INJECTION, SOLUTION INTRAVENOUS CONTINUOUS
Status: DISCONTINUED | OUTPATIENT
Start: 2020-01-07 | End: 2020-01-09 | Stop reason: CLARIF

## 2020-01-07 RX ORDER — ONDANSETRON 4 MG/1
4 TABLET, ORALLY DISINTEGRATING ORAL EVERY 30 MIN PRN
Status: DISCONTINUED | OUTPATIENT
Start: 2020-01-07 | End: 2020-01-07 | Stop reason: HOSPADM

## 2020-01-07 RX ORDER — POTASSIUM CHLORIDE 1500 MG/1
20-40 TABLET, EXTENDED RELEASE ORAL
Status: DISCONTINUED | OUTPATIENT
Start: 2020-01-07 | End: 2020-01-10 | Stop reason: HOSPADM

## 2020-01-07 RX ORDER — MAGNESIUM AMINO ACID CHELATE 27 MG
250 TABLET ORAL EVERY EVENING
Status: DISCONTINUED | OUTPATIENT
Start: 2020-01-07 | End: 2020-01-10 | Stop reason: HOSPADM

## 2020-01-07 RX ORDER — ASPIRIN 81 MG/1
81 TABLET ORAL DAILY
Status: ON HOLD | COMMUNITY
End: 2020-01-10

## 2020-01-07 RX ORDER — FENTANYL CITRATE 50 UG/ML
INJECTION, SOLUTION INTRAMUSCULAR; INTRAVENOUS PRN
Status: DISCONTINUED | OUTPATIENT
Start: 2020-01-07 | End: 2020-01-07

## 2020-01-07 RX ORDER — LORATADINE 10 MG/1
10 TABLET ORAL DAILY
Status: DISCONTINUED | OUTPATIENT
Start: 2020-01-07 | End: 2020-01-10 | Stop reason: HOSPADM

## 2020-01-07 RX ORDER — DIPHENHYDRAMINE HCL 12.5MG/5ML
12.5 LIQUID (ML) ORAL EVERY 6 HOURS PRN
Status: DISCONTINUED | OUTPATIENT
Start: 2020-01-07 | End: 2020-01-10 | Stop reason: HOSPADM

## 2020-01-07 RX ORDER — VIT C/E/ZN/COPPR/LUTEIN/ZEAXAN 60 MG-6 MG
1 CAPSULE ORAL 2 TIMES DAILY
Status: DISCONTINUED | OUTPATIENT
Start: 2020-01-07 | End: 2020-01-10 | Stop reason: HOSPADM

## 2020-01-07 RX ORDER — ONDANSETRON 2 MG/ML
INJECTION INTRAMUSCULAR; INTRAVENOUS PRN
Status: DISCONTINUED | OUTPATIENT
Start: 2020-01-07 | End: 2020-01-07

## 2020-01-07 RX ORDER — ACETAMINOPHEN 325 MG/1
650 TABLET ORAL EVERY 4 HOURS PRN
Status: DISCONTINUED | OUTPATIENT
Start: 2020-01-10 | End: 2020-01-10 | Stop reason: HOSPADM

## 2020-01-07 RX ORDER — NALOXONE HYDROCHLORIDE 0.4 MG/ML
.1-.4 INJECTION, SOLUTION INTRAMUSCULAR; INTRAVENOUS; SUBCUTANEOUS
Status: DISCONTINUED | OUTPATIENT
Start: 2020-01-07 | End: 2020-01-07

## 2020-01-07 RX ORDER — SODIUM CHLORIDE, SODIUM LACTATE, POTASSIUM CHLORIDE, CALCIUM CHLORIDE 600; 310; 30; 20 MG/100ML; MG/100ML; MG/100ML; MG/100ML
INJECTION, SOLUTION INTRAVENOUS CONTINUOUS PRN
Status: DISCONTINUED | OUTPATIENT
Start: 2020-01-07 | End: 2020-01-07

## 2020-01-07 RX ORDER — PROPOFOL 10 MG/ML
INJECTION, EMULSION INTRAVENOUS CONTINUOUS PRN
Status: DISCONTINUED | OUTPATIENT
Start: 2020-01-07 | End: 2020-01-07

## 2020-01-07 RX ORDER — ALBUTEROL SULFATE 90 UG/1
AEROSOL, METERED RESPIRATORY (INHALATION) PRN
Status: DISCONTINUED | OUTPATIENT
Start: 2020-01-07 | End: 2020-01-07

## 2020-01-07 RX ORDER — POTASSIUM CHLORIDE 29.8 MG/ML
20 INJECTION INTRAVENOUS
Status: DISCONTINUED | OUTPATIENT
Start: 2020-01-07 | End: 2020-01-10 | Stop reason: HOSPADM

## 2020-01-07 RX ORDER — CEPHALEXIN 500 MG/1
1000 CAPSULE ORAL 2 TIMES DAILY PRN
COMMUNITY
End: 2022-11-10

## 2020-01-07 RX ORDER — SODIUM CHLORIDE, SODIUM LACTATE, POTASSIUM CHLORIDE, CALCIUM CHLORIDE 600; 310; 30; 20 MG/100ML; MG/100ML; MG/100ML; MG/100ML
INJECTION, SOLUTION INTRAVENOUS CONTINUOUS
Status: DISCONTINUED | OUTPATIENT
Start: 2020-01-07 | End: 2020-01-07 | Stop reason: HOSPADM

## 2020-01-07 RX ORDER — OXYCODONE HYDROCHLORIDE 5 MG/1
5-10 TABLET ORAL EVERY 4 HOURS PRN
Status: DISCONTINUED | OUTPATIENT
Start: 2020-01-07 | End: 2020-01-10 | Stop reason: HOSPADM

## 2020-01-07 RX ORDER — MULTIVIT,TX WITH IRON,MINERALS
250 TABLET, EXTENDED RELEASE ORAL EVERY EVENING
COMMUNITY
End: 2023-12-19

## 2020-01-07 RX ORDER — ACETAMINOPHEN 325 MG/1
975 TABLET ORAL EVERY 8 HOURS
Status: COMPLETED | OUTPATIENT
Start: 2020-01-07 | End: 2020-01-10

## 2020-01-07 RX ORDER — METHOCARBAMOL 500 MG/1
500 TABLET, FILM COATED ORAL 4 TIMES DAILY PRN
Status: DISCONTINUED | OUTPATIENT
Start: 2020-01-07 | End: 2020-01-10 | Stop reason: HOSPADM

## 2020-01-07 RX ORDER — LANOLIN ALCOHOL/MO/W.PET/CERES
400 CREAM (GRAM) TOPICAL DAILY
Status: DISCONTINUED | OUTPATIENT
Start: 2020-01-07 | End: 2020-01-10 | Stop reason: HOSPADM

## 2020-01-07 RX ORDER — NALOXONE HYDROCHLORIDE 0.4 MG/ML
.1-.4 INJECTION, SOLUTION INTRAMUSCULAR; INTRAVENOUS; SUBCUTANEOUS
Status: DISCONTINUED | OUTPATIENT
Start: 2020-01-07 | End: 2020-01-10 | Stop reason: HOSPADM

## 2020-01-07 RX ORDER — METOCLOPRAMIDE 5 MG/1
5 TABLET ORAL EVERY 6 HOURS PRN
Status: DISCONTINUED | OUTPATIENT
Start: 2020-01-07 | End: 2020-01-10 | Stop reason: HOSPADM

## 2020-01-07 RX ORDER — LIDOCAINE 40 MG/G
CREAM TOPICAL
Status: DISCONTINUED | OUTPATIENT
Start: 2020-01-07 | End: 2020-01-10 | Stop reason: HOSPADM

## 2020-01-07 RX ORDER — SENNOSIDES 8.6 MG
650 CAPSULE ORAL 3 TIMES DAILY PRN
COMMUNITY
End: 2020-01-13

## 2020-01-07 RX ORDER — POTASSIUM CHLORIDE 7.45 MG/ML
10 INJECTION INTRAVENOUS
Status: DISCONTINUED | OUTPATIENT
Start: 2020-01-07 | End: 2020-01-10 | Stop reason: HOSPADM

## 2020-01-07 RX ORDER — ONDANSETRON 2 MG/ML
4 INJECTION INTRAMUSCULAR; INTRAVENOUS EVERY 30 MIN PRN
Status: DISCONTINUED | OUTPATIENT
Start: 2020-01-07 | End: 2020-01-07 | Stop reason: HOSPADM

## 2020-01-07 RX ORDER — CALCIUM POLYCARBOPHIL 625 MG 625 MG/1
1250 TABLET ORAL EVERY MORNING
Status: DISCONTINUED | OUTPATIENT
Start: 2020-01-08 | End: 2020-01-10 | Stop reason: HOSPADM

## 2020-01-07 RX ORDER — FLUTICASONE PROPIONATE 50 MCG
2 SPRAY, SUSPENSION (ML) NASAL DAILY
Status: DISCONTINUED | OUTPATIENT
Start: 2020-01-08 | End: 2020-01-10 | Stop reason: HOSPADM

## 2020-01-07 RX ORDER — HYDROMORPHONE HYDROCHLORIDE 1 MG/ML
.3-.5 INJECTION, SOLUTION INTRAMUSCULAR; INTRAVENOUS; SUBCUTANEOUS EVERY 5 MIN PRN
Status: DISCONTINUED | OUTPATIENT
Start: 2020-01-07 | End: 2020-01-07 | Stop reason: HOSPADM

## 2020-01-07 RX ORDER — BISACODYL 10 MG
10 SUPPOSITORY, RECTAL RECTAL DAILY PRN
Status: DISCONTINUED | OUTPATIENT
Start: 2020-01-07 | End: 2020-01-10 | Stop reason: HOSPADM

## 2020-01-07 RX ORDER — CEFAZOLIN SODIUM 2 G/100ML
2 INJECTION, SOLUTION INTRAVENOUS EVERY 8 HOURS
Status: COMPLETED | OUTPATIENT
Start: 2020-01-07 | End: 2020-01-08

## 2020-01-07 RX ORDER — CEFAZOLIN SODIUM 2 G/100ML
2 INJECTION, SOLUTION INTRAVENOUS
Status: COMPLETED | OUTPATIENT
Start: 2020-01-07 | End: 2020-01-07

## 2020-01-07 RX ORDER — ONDANSETRON 4 MG/1
4 TABLET, ORALLY DISINTEGRATING ORAL EVERY 6 HOURS PRN
Status: DISCONTINUED | OUTPATIENT
Start: 2020-01-07 | End: 2020-01-10 | Stop reason: HOSPADM

## 2020-01-07 RX ORDER — ALBUTEROL SULFATE 90 UG/1
2 AEROSOL, METERED RESPIRATORY (INHALATION) 2 TIMES DAILY PRN
Status: DISCONTINUED | OUTPATIENT
Start: 2020-01-07 | End: 2020-01-10 | Stop reason: HOSPADM

## 2020-01-07 RX ORDER — GUAIFENESIN AND DEXTROMETHORPHAN HYDROBROMIDE 600; 30 MG/1; MG/1
1 TABLET, EXTENDED RELEASE ORAL EVERY MORNING
Status: DISCONTINUED | OUTPATIENT
Start: 2020-01-08 | End: 2020-01-10 | Stop reason: HOSPADM

## 2020-01-07 RX ORDER — GABAPENTIN 100 MG/1
100 CAPSULE ORAL
Status: COMPLETED | OUTPATIENT
Start: 2020-01-07 | End: 2020-01-07

## 2020-01-07 RX ORDER — SODIUM CHLORIDE 9 MG/ML
INJECTION, SOLUTION INTRAVENOUS CONTINUOUS PRN
Status: DISCONTINUED | OUTPATIENT
Start: 2020-01-07 | End: 2020-01-07

## 2020-01-07 RX ADMIN — TRANEXAMIC ACID 1 MG/KG/HR: 1 INJECTION, SOLUTION INTRAVENOUS at 08:43

## 2020-01-07 RX ADMIN — MELATONIN 5000 UNITS: at 20:10

## 2020-01-07 RX ADMIN — CEFAZOLIN 1 G: 1 INJECTION, POWDER, FOR SOLUTION INTRAMUSCULAR; INTRAVENOUS at 12:04

## 2020-01-07 RX ADMIN — CEFAZOLIN SODIUM 2 G: 2 INJECTION, SOLUTION INTRAVENOUS at 20:11

## 2020-01-07 RX ADMIN — ONDANSETRON 4 MG: 2 INJECTION INTRAMUSCULAR; INTRAVENOUS at 15:02

## 2020-01-07 RX ADMIN — PHENYLEPHRINE HYDROCHLORIDE 100 MCG: 10 INJECTION INTRAVENOUS at 08:56

## 2020-01-07 RX ADMIN — HYDROMORPHONE HYDROCHLORIDE 0.3 MG: 1 INJECTION, SOLUTION INTRAMUSCULAR; INTRAVENOUS; SUBCUTANEOUS at 22:24

## 2020-01-07 RX ADMIN — ROCURONIUM BROMIDE 50 MG: 10 INJECTION INTRAVENOUS at 13:04

## 2020-01-07 RX ADMIN — PHENYLEPHRINE HYDROCHLORIDE 100 MCG: 10 INJECTION INTRAVENOUS at 08:23

## 2020-01-07 RX ADMIN — GABAPENTIN 100 MG: 100 CAPSULE ORAL at 06:32

## 2020-01-07 RX ADMIN — Medication 10 MG: at 15:20

## 2020-01-07 RX ADMIN — ROCURONIUM BROMIDE 50 MG: 10 INJECTION INTRAVENOUS at 09:31

## 2020-01-07 RX ADMIN — ROCURONIUM BROMIDE 40 MG: 10 INJECTION INTRAVENOUS at 07:46

## 2020-01-07 RX ADMIN — POTASSIUM CHLORIDE AND SODIUM CHLORIDE: 900; 150 INJECTION, SOLUTION INTRAVENOUS at 18:58

## 2020-01-07 RX ADMIN — LIDOCAINE HYDROCHLORIDE 100 MG: 20 INJECTION, SOLUTION INFILTRATION; PERINEURAL at 07:46

## 2020-01-07 RX ADMIN — ROCURONIUM BROMIDE 50 MG: 10 INJECTION INTRAVENOUS at 11:20

## 2020-01-07 RX ADMIN — ROCURONIUM BROMIDE 10 MG: 10 INJECTION INTRAVENOUS at 14:46

## 2020-01-07 RX ADMIN — PHENYLEPHRINE HYDROCHLORIDE 200 MCG: 10 INJECTION INTRAVENOUS at 10:13

## 2020-01-07 RX ADMIN — SODIUM CHLORIDE, POTASSIUM CHLORIDE, SODIUM LACTATE AND CALCIUM CHLORIDE: 600; 310; 30; 20 INJECTION, SOLUTION INTRAVENOUS at 15:53

## 2020-01-07 RX ADMIN — PHENYLEPHRINE HYDROCHLORIDE 100 MCG: 10 INJECTION INTRAVENOUS at 08:16

## 2020-01-07 RX ADMIN — ALBUTEROL SULFATE 6 PUFF: 90 AEROSOL, METERED RESPIRATORY (INHALATION) at 15:53

## 2020-01-07 RX ADMIN — Medication 20 MG: at 15:02

## 2020-01-07 RX ADMIN — LORATADINE 10 MG: 10 TABLET ORAL at 20:10

## 2020-01-07 RX ADMIN — SENNOSIDES AND DOCUSATE SODIUM 1 TABLET: 8.6; 5 TABLET ORAL at 21:22

## 2020-01-07 RX ADMIN — SODIUM CHLORIDE: 9 INJECTION, SOLUTION INTRAVENOUS at 08:33

## 2020-01-07 RX ADMIN — ACETAMINOPHEN 975 MG: 325 TABLET, FILM COATED ORAL at 20:09

## 2020-01-07 RX ADMIN — SODIUM CHLORIDE, POTASSIUM CHLORIDE, SODIUM LACTATE AND CALCIUM CHLORIDE: 600; 310; 30; 20 INJECTION, SOLUTION INTRAVENOUS at 06:37

## 2020-01-07 RX ADMIN — CEFAZOLIN SODIUM 2 G: 2 INJECTION, SOLUTION INTRAVENOUS at 08:05

## 2020-01-07 RX ADMIN — HYDROMORPHONE HYDROCHLORIDE 0.5 MG: 1 INJECTION, SOLUTION INTRAMUSCULAR; INTRAVENOUS; SUBCUTANEOUS at 15:24

## 2020-01-07 RX ADMIN — PHENYLEPHRINE HYDROCHLORIDE 0.15 MCG/KG/MIN: 10 INJECTION INTRAVENOUS at 10:24

## 2020-01-07 RX ADMIN — SCOPALAMINE 1 PATCH: 1 PATCH, EXTENDED RELEASE TRANSDERMAL at 07:03

## 2020-01-07 RX ADMIN — POLYETHYLENE GLYCOL 3350 17 G: 17 POWDER, FOR SOLUTION ORAL at 20:10

## 2020-01-07 RX ADMIN — PROPOFOL 25 MCG/KG/MIN: 10 INJECTION, EMULSION INTRAVENOUS at 08:00

## 2020-01-07 RX ADMIN — ALBUTEROL SULFATE 2 PUFF: 90 AEROSOL, METERED RESPIRATORY (INHALATION) at 07:36

## 2020-01-07 RX ADMIN — SODIUM CHLORIDE, POTASSIUM CHLORIDE, SODIUM LACTATE AND CALCIUM CHLORIDE: 600; 310; 30; 20 INJECTION, SOLUTION INTRAVENOUS at 13:08

## 2020-01-07 RX ADMIN — Medication 250 MG: at 20:10

## 2020-01-07 RX ADMIN — ROCURONIUM BROMIDE 10 MG: 10 INJECTION INTRAVENOUS at 08:42

## 2020-01-07 RX ADMIN — Medication 1 CAPSULE: at 21:21

## 2020-01-07 RX ADMIN — POTASSIUM CHLORIDE 40 MEQ: 1.5 POWDER, FOR SOLUTION ORAL at 21:21

## 2020-01-07 RX ADMIN — HYDROMORPHONE HYDROCHLORIDE 0.5 MG: 1 INJECTION, SOLUTION INTRAMUSCULAR; INTRAVENOUS; SUBCUTANEOUS at 08:46

## 2020-01-07 RX ADMIN — FENTANYL CITRATE 100 MCG: 50 INJECTION, SOLUTION INTRAMUSCULAR; INTRAVENOUS at 07:36

## 2020-01-07 RX ADMIN — PROPOFOL 200 MG: 10 INJECTION, EMULSION INTRAVENOUS at 07:46

## 2020-01-07 RX ADMIN — SODIUM CHLORIDE, POTASSIUM CHLORIDE, SODIUM LACTATE AND CALCIUM CHLORIDE: 600; 310; 30; 20 INJECTION, SOLUTION INTRAVENOUS at 10:10

## 2020-01-07 RX ADMIN — TRANEXAMIC ACID 1000 MG: 100 INJECTION, SOLUTION INTRAVENOUS at 08:23

## 2020-01-07 RX ADMIN — ALBUMIN HUMAN: 0.05 INJECTION, SOLUTION INTRAVENOUS at 08:57

## 2020-01-07 RX ADMIN — PHENYLEPHRINE HYDROCHLORIDE 100 MCG: 10 INJECTION INTRAVENOUS at 15:31

## 2020-01-07 RX ADMIN — PHENYLEPHRINE HYDROCHLORIDE 100 MCG: 10 INJECTION INTRAVENOUS at 10:24

## 2020-01-07 RX ADMIN — HYDROMORPHONE HYDROCHLORIDE 0.5 MG: 1 INJECTION, SOLUTION INTRAMUSCULAR; INTRAVENOUS; SUBCUTANEOUS at 15:48

## 2020-01-07 RX ADMIN — FOLIC ACID TAB 400 MCG 400 MCG: 400 TAB at 20:10

## 2020-01-07 ASSESSMENT — ENCOUNTER SYMPTOMS: SEIZURES: 0

## 2020-01-07 ASSESSMENT — ACTIVITIES OF DAILY LIVING (ADL): ADLS_ACUITY_SCORE: 16

## 2020-01-07 ASSESSMENT — LIFESTYLE VARIABLES: TOBACCO_USE: 0

## 2020-01-07 ASSESSMENT — MIFFLIN-ST. JEOR: SCORE: 1247.1

## 2020-01-07 NOTE — OP NOTE
Procedure Date: 01/07/2020      PREOPERATIVE DIAGNOSES:  Degenerative spondylolisthesis, spinal stenosis and neurogenic claudication.      POSTOPERATIVE DIAGNOSES:  Degenerative spondylolisthesis, spinal stenosis and neurogenic claudication.      PROCEDURES:  L3, L4 and L5 laminectomies, posterior spinal fusion and posterior lumbar interbody fusion L3 to L4, L4 to L5 and L5 to S1 using intervertebral prostheses, local autograft and instrumentation.      SURGEON:  Regan Collier MD      ASSISTANT:  Darlene Gomez PA-C      ANESTHESIA:  General with endotracheal tube.      INDICATIONS FOR PROCEDURE:  Ms. Templeton is a 69-year-old woman with a long history of low back and bilateral leg pain and numbness.  Her symptoms are present with standing and walking, relieved when she sits or lies down.  Preoperative evaluation revealed degenerative spondylolisthesis at L3 to L4, L4 to L5 and L5 to S1 with severe spinal stenosis at L3 to L4 and L4 to L5, lateral recess stenosis at L5 to S1.  Because her symptoms were refractory to conservative treatment, she elected for operative treatment.      OPERATIVE PROCEDURE:  After informed consent was obtained from the patient, satisfactory general anesthesia achieved, the patient was turned to the prone position on the Jason frame.  Care was taken to pad all bony prominences.  Her back was prepped and draped in sterile fashion.  After positioning, pulses were palpable in all 4 extremities.      After appropriate patient and site identification, a longitudinal incision incorporating her old surgical scar was made from the L2 level down to the sacrum.  Marcaine 0.25% with epinephrine was infiltrated into the wound edges to aid with hemostasis and postoperative pain control.  Subcutaneous dissection continued with the Bovie down to the lumbar fascia.  Lumbar fascia was divided over the L5 spinous process.  A Kocher clamp was placed in the spinous process and a lateral x-ray obtained  confirming the position at L5.  Dissection continued subperiosteally in the spinous process and lamina of L3 through L5.  The inferior edge of L2 and superior edge of S1 were also exposed.  The dissection continued out over the facet joints on the transverse processes of L3, L4, L5 and the sacral ala.  Sponges were packed into the posterolateral gutter.  Attention was turned to the midline, where the spinous processes of L3, L4 and L5 were removed with a Numerex bone cutter.  The bone was thinned with a Leksell rongeur.  All bone removed with the decompression, was cleaned and placed through the bone mill for later use as graft.  Entry was gained into the spinal canal at the L5 to S1 interspace and midline laminectomy was performed.  The remaining lamina at L3, L4 and L5 was removed bilaterally with Kerrison rongeurs and the inferior edge of the L2 lamina was removed and the anterior portion of the L2 lamina was undercut with Kerrison rongeurs.  Foraminotomies were performed where necessary and after the decompression, a Troy elevator could be passed out along the path of the L3, L4, L5 and S1 nerves and there was no evidence of any further tethering material.      Attention was turned to the pedicles which were prepared to accept pedicle screws from the Ally Everest set; 6.5 x 50 screws were placed on the left at L3, L4 and L5 on the right at L3 and L4 and 6.5 x 45 screws were placed on the right at L5 and S1 and a 6.5 x 40 screw was placed in the left at S1.  AP and lateral x-rays were then obtained showing good position of the screws in both planes at each level.  Attention was turned to the left side and distraction was placed across the L5 to S1 screws and the dura was retracted medially.  The annulus was cut with a Bovie and the entirety of the L5 to S1 disk was removed with curets and pituitaries.  The disk was distracted up to 11 mm, it was filled with the local autograft and a 10 x 28 x 11 mm [place]  implant was filled with demineralized bone matrix and then tapped into place with a drift and mallet.  Attention was then turned to the L4 to L5 level.  Distraction was placed across the pedicle screws in the left side.  The dura was retracted medially in similar fashion.  The entirety of the L4 to L5 disk was removed.  It was distracted up to 13 mm and a 10 x 28 x 13 mm implant was filled with demineralized bone matrix.  The disk space was filled with local autograft and intervertebral prosthesis tapped into place with a drift and mallet.  Attention was then turned to the L3 to L4 level.  In similar fashion, the distraction was placed across the pedicle screws and the dura retracted medially and the L3 to L4 disk was removed in its entirety with curets and pituitaries.  It was distracted up to 12 mm, it was filled with local autograft.  A 10 x 28 x 12 mm [place] implant was filled with demineralized bone matrix and tapped into place with a drift and mallet.  Appropriate length rods were then placed onto the pedicle screws and tightened to appropriate torque with torque and anti-torque wrench.  Patency of the foramen was checked and all were found to be open and patent.  The dura was covered with thrombin-soaked Gelfoam.  AP and lateral x-rays were obtained showing good position of the intervertebral prosthesis L3 to the sacrum, good position of the pedicle screws L3 to sacrum.  The spondylolistheses had been partially reduced at each level.  The wound was irrigated with copious amounts of antibiotic solution.  A 15 Citizen of [place] channel drain was placed along the posterior border of the spine and brought out to the right of the incision.  The lumbar muscle fascia was reapproximated with multiple figure-of-eight sutures of #1 Vicryl and a running suture of #2 Stratafix.  Skin was closed with multiple subcutaneous sutures of 2-0 Vicryl and a running subcuticular stitch of 3-0 Vicryl.  Sterile Steri-Strips and dressing were  applied.  The BITA was hooked to its suction bulb.  The patient was turned from the Jason table, awakened, extubated, and left the operating room in good condition.     KADIE Cortez's assistance was essential during the entire procedure for safe retraction of visceral and neural tissue, suctioning of blood for visualization and holding alignment during placement of the implants and bone graft.         ESTIMATED BLOOD LOSS:  600 mL.        REPLACEMENT:  240 mL from the Cell Saver, 4000 mL of crystalloid, 250 mL of albumin.        TOTAL TIME FOR THE PROCEDURE FROM SKIN TO SKIN:  420 minutes.         DEJUAN COLLIER MD             D: 2020   T: 2020   MT: DIEGO      Name:     LATONIA MARSH   MRN:      [id number]        Account:        [id number]   :      1950           Procedure Date: 2020      Document: [id number]       cc: Dejuan Collier MD

## 2020-01-07 NOTE — PROGRESS NOTES
Medication History Completed by Medication Scribe  Admission medication history interview status for the 1/7/2020  admission is complete. See EPIC admission navigator for prior to admission medications     Medication history sources: Patient, H&P and Care Everywhere  Medication history source reliability: Moderate  Adherence assessment: N/A Not Observed    Significant changes made to the medication list:  None      Additional medication history information:   Patient brought own home meds: Albuterol inhaler, Breo Ellipta, inhaler    Medication reconciliation completed by provider prior to medication history? No    Time spent in this activity: 75 minutes      Prior to Admission medications    Medication Sig Last Dose Taking? Auth Provider   acetaminophen (TYLENOL 8 HOUR ARTHRITIS PAIN) 650 MG CR tablet Take 650 mg by mouth 3 times daily as needed for mild pain or fever 12/31/2019 at Unknown Yes Reported, Patient   acetaminophen (TYLENOL) 500 MG tablet Take 500 mg by mouth 3 times daily as needed for mild pain 1/7/2020 at 0500 Yes Reported, Patient   albuterol (PROAIR HFA/PROVENTIL HFA/VENTOLIN HFA) 108 (90 BASE) MCG/ACT Inhaler Inhale 2 puffs into the lungs 2 times daily as needed for shortness of breath / dyspnea or wheezing  1/7/2020 at 0500 Yes Reported, Patient   aspirin 81 MG EC tablet Take 81 mg by mouth daily 12/24/2019 at Unknown Yes Reported, Patient   cephALEXin (KEFLEX) 500 MG capsule Take 1,000 mg by mouth 2 times daily as needed (before and after dental procedure) 1+ month at Unknown Yes Reported, Patient   cholecalciferol (VITAMIN D3) 125 MCG (5000 UT) TABS tablet Take 5,000 Units by mouth daily 1/6/2020 at AM Yes Reported, Patient   dextromethorphan-guaiFENesin (MUCINEX DM)  MG 12 hr tablet Take 1 tablet by mouth every morning 1/6/2020 at AM Yes Reported, Patient   diclofenac (VOLTAREN) 1 % GEL topical gel Place onto the skin 2 times daily as needed for moderate pain  12/30/2019 at Unknown Yes  Reported, Patient   FIBER ADULT GUMMIES PO Take 2 chew tab by mouth every morning (FIBERWELL)  1/6/2020 at AM Yes Reported, Patient   fluticasone-vilanterol (BREO ELLIPTA) 100-25 MCG/INH inhaler Inhale 1 puff into the lungs daily  1/7/2020 at 0500 Yes Reported, Patient   folic acid (FOLVITE) 400 MCG tablet Take 400 mcg by mouth daily 1/6/2020 at AM Yes Reported, Patient   lidocaine (LIDODERM) 5 % Patch Place 1 patch onto the skin daily as needed for moderate pain 12/30/2019 at Unknown Yes Reported, Patient   loratadine (CLARITIN) 10 MG tablet Take 10 mg by mouth daily  1/6/2020 at AM Yes Reported, Patient   magnesium gluconate (MAGONATE) 250 MG tablet Take 250 mg by mouth every evening 1/6/2020 at PM Yes Reported, Patient   meloxicam (MOBIC) 15 MG tablet Take 15 mg by mouth every evening 12/31/2019 at Unkown Yes Reported, Patient   Menthol, Topical Analgesic, (BIOFREEZE ROLL-ON) 4 % GEL Apply topically 2 times daily as needed (pain) 1/5/2020 at PM Yes Reported, Patient   Misc Natural Products (GLUCOS-CHONDROIT-MSM COMPLEX PO) Take 1 tablet by mouth every evening 12/24/2019 at PM Yes Reported, Patient   mometasone (NASONEX) 50 MCG/ACT nasal spray Spray 2 sprays into both nostrils daily 1/7/2020 at 0500 Yes Reported, Patient   Montelukast Sodium (SINGULAIR PO) Take 10 mg by mouth every morning  1/6/2020 at AM Yes Reported, Patient   Multiple Vitamins-Minerals (PRESERVISION AREDS 2 PO) Take 1 capsule by mouth 2 times daily 1/6/2020 at PM Yes Reported, Patient   Omega-3 Fatty Acids (FISH OIL PO) Take 1,200 mg by mouth daily 1+ month at Unknown Yes Reported, Patient   OVER-THE-COUNTER Take 1 Container by mouth every morning Mixed with orange juice. Vitamin supplement called Zipfizz. 1/6/2020 at AM Yes Reported, Patient   Prenatal Vit-Fe Fumarate-FA (PRENATAL 19) 29-1 MG CHEW Take 1 chew tab by mouth every evening  1/6/2020 at PM Yes Reported, Patient   pseudoePHEDrine (SUDOGEST 12 HOUR) 120 MG 12 hr tablet Take 120 mg by  mouth At Bedtime  1/6/2020 at 2300 Yes Reported, Patient   senna-docusate (SENOKOT-S;PERICOLACE) 8.6-50 MG per tablet Take 1-2 tablets by mouth 2 times daily  Patient taking differently: Take 2 tablets by mouth At Bedtime  1/5/2020 at HS Yes Vito Garcia PA-C   triamterene-HCTZ (DYAZIDE) 37.5-25 MG capsule Take 1 capsule by mouth every morning 1/6/2020 at AM Yes Reported, Patient   order for DME Equipment being ordered: Walker Wheels () and Walker ()  Treatment Diagnosis: difficulty with gait   Ambar Wilson MD   ORDER FOR DME Equipment being ordered: rt short Dwayne Neri,

## 2020-01-07 NOTE — ANESTHESIA CARE TRANSFER NOTE
Patient: Melissa Templeton    Procedure(s):  L3,L4,L5 LAMINECTOMIES; POSTERIOR SPINAL FUSION; POSTERIOR LUMBAR INTERBODY FUSION L3-S1 USING INTERVERTEBRAL PROSTHESIS; LOCAL AUTOGRAFT AND INSTRUMENTATION.    Diagnosis: Spondylolisthesis of lumbar region [M43.16]  Pseudoclaudication syndrome [M48.062]  Diagnosis Additional Information: No value filed.    Anesthesia Type:   General, ETT     Note:  Airway :Face Mask  Patient transferred to:PACU  Comments: Neuromuscular blockade reversed after TOF 2/4, spontaneous respirations, adequate tidal volumes, followed commands to voice, oropharynx suctioned with soft flexible catheter, extubated atraumatically, airway patent after extubation.  Oxygen via facemask at 10 liters per minute to PACU. Oxygen tubing connected to wall O2 in PACU, SpO2, NiBP, and EKG monitors and alarms on and functioning, report on patient's clinical status given to PACU RN, RN questions answered. Handoff Report: Identifed the Patient, Identified the Reponsible Provider, Reviewed the pertinent medical history, Discussed the surgical course, Reviewed Intra-OP anesthesia mangement and issues during anesthesia, Set expectations for post-procedure period and Allowed opportunity for questions and acknowledgement of understanding      Vitals: (Last set prior to Anesthesia Care Transfer)    CRNA VITALS  1/7/2020 1532 - 1/7/2020 1606      1/7/2020             Pulse:  105    SpO2:  93 %    Resp Rate (set):  10                Electronically Signed By: INDIA Nick CRNA  January 7, 2020  4:06 PM

## 2020-01-07 NOTE — ANESTHESIA PREPROCEDURE EVALUATION
Anesthesia Pre-Procedure Evaluation    Patient: Melissa Templeton   MRN: [id number] : 1950          Preoperative Diagnosis: Spondylolisthesis of lumbar region [M43.16]  Pseudoclaudication syndrome [M48.062]    Procedure(s):  L3,L4,L5 LAMINECTOMIES; POSTERIOR SPINAL FUSION; POSTERIOR LUMBAR INTERBODY FUSION L3-S1 USING INTERVERTEBRAL PROSTHESIS; LOCAL AUTOGRAFT AND INSTRUMENTATION (APRIL TABLE; MIDAS AM8; CELL SAVER; ALEUTIAN; EVEREST)^    Past Medical History:   Diagnosis Date     Allergic rhinitis, cause unspecified      Fluid overload      Hyperlipidemia      Hypoglycemia, unspecified      Obese      Osteoarthrosis, unspecified whether generalized or localized, unspecified site      Sleep apnea     cpap     Uncomplicated asthma      Unspecified cataract      Vitamin D deficiency      Past Surgical History:   Procedure Laterality Date     APPENDECTOMY       ARTHROPLASTY KNEE Right 2018    Procedure: ARTHROPLASTY KNEE;  RIGHT TOTAL KNEE ARTHROPLASTY    EBL: 5mL;  Surgeon: Ambar Wilson MD;  Location: [facility] OR     ARTHROPLASTY SHOULDER Right 2019    Procedure: RIGHT TOTAL SHOULDER ARTHROPLASTY REVERSE (TORNIER)^;  Surgeon: Ambar Wilson MD;  Location: [facility] OR     BACK SURGERY       C NONSPECIFIC PROCEDURE      Hysterectomy fibroids - bilat S&O     C NONSPECIFIC PROCEDURE      Discectomy L5-S1     C NONSPECIFIC PROCEDURE      Cosmetic below eyes     CHOLECYSTECTOMY       COLONOSCOPY       EYE SURGERY       GYN SURGERY      MARYJO BSO     ORTHOPEDIC SURGERY      dulce. rotator cuffs     ORTHOPEDIC SURGERY      lumbar laminectomy       Anesthesia Evaluation     . Pt has had prior anesthetic. Type: General    No history of anesthetic complications          ROS/MED HX    ENT/Pulmonary:     (+)sleep apnea, Mild Persistent asthma Treatment: Inhaler prn,  uses CPAP , recent URI resolved Prednisone and Z alix last week. Feels at baseline now. : . .   (-) tobacco use   Neurologic:      (-) seizures  "and CVA   Cardiovascular:     (+) Dyslipidemia, ----. : . . . :. .      (-) CAD   METS/Exercise Tolerance:  4 - Raking leaves, gardening   Hematologic:         Musculoskeletal:         GI/Hepatic:        (-) GERD   Renal/Genitourinary:      (-) renal disease   Endo: Comment: Pre-DM    (+) Obesity, .      Psychiatric:        (-) psychiatric history   Infectious Disease:         Malignancy:         Other:                          Physical Exam      Airway   Mallampati: II  TM distance: >3 FB  Neck ROM: full    Dental   Comment: *Bonding on two front teeth.    Cardiovascular   Rhythm and rate: regular  (-) no murmur    Pulmonary    breath sounds clear to auscultation(-) no wheezes            Lab Results   Component Value Date    WBC 6.1 11/17/2008    HGB 11.3 (L) 06/14/2019    HCT 38.7 11/17/2008     11/17/2008     11/17/2008    POTASSIUM 3.5 06/13/2019    CHLORIDE 105 11/17/2008    CO2 26 11/17/2008    BUN 19 11/17/2008    CR 0.71 06/14/2019     (H) 06/14/2019    MATTI 9.1 11/17/2008    ALBUMIN 4.6 11/17/2008    PROTTOTAL 8.4 11/17/2008    ALT 27 11/17/2008    AST 34 11/17/2008    ALKPHOS 87 11/17/2008    BILITOTAL <0.1 (L) 11/17/2008       Preop Vitals  BP Readings from Last 3 Encounters:   01/07/20 (!) 171/95   06/14/19 96/56   01/25/18 126/73    Pulse Readings from Last 3 Encounters:   06/13/19 90   01/25/18 93   11/17/08 90      Resp Readings from Last 3 Encounters:   01/07/20 16   06/14/19 16   01/25/18 16    SpO2 Readings from Last 3 Encounters:   01/07/20 96%   06/14/19 94%   01/25/18 91%      Temp Readings from Last 1 Encounters:   01/07/20 36.6  C (97.9  F) (Temporal)    Ht Readings from Last 1 Encounters:   01/07/20 1.499 m (4' 11\")      Wt Readings from Last 1 Encounters:   01/07/20 81.6 kg (180 lb)    Estimated body mass index is 36.36 kg/m  as calculated from the following:    Height as of this encounter: 1.499 m (4' 11\").    Weight as of this encounter: 81.6 kg (180 lb). "       Anesthesia Plan      History & Physical Review  History and physical reviewed and following examination; no interval change.    ASA Status:  2 .    NPO Status:  > 8 hours    Plan for General and ETT with Intravenous induction. Maintenance will be Balanced.    PONV prophylaxis:  Ondansetron (or other 5HT-3) and Scopolamine patch  Additional equipment: Videolaryngoscope Bonding on 2 front teeth. Use glidescope.   Lungs appear to be at baseline currently.   Use ventolin pre-op.       Postoperative Care  Postoperative pain management:  Multi-modal analgesia.      Consents  Anesthetic plan, risks, benefits and alternatives discussed with:  Patient.  Use of blood products discussed: Yes.   Consented to blood products.  .                 Mohsen Thakur MD

## 2020-01-07 NOTE — ANESTHESIA POSTPROCEDURE EVALUATION
Patient: Melissa Templeton    Procedure(s):  L3,L4,L5 LAMINECTOMIES; POSTERIOR SPINAL FUSION; POSTERIOR LUMBAR INTERBODY FUSION L3-S1 USING INTERVERTEBRAL PROSTHESIS; LOCAL AUTOGRAFT AND INSTRUMENTATION.    Diagnosis:Spondylolisthesis of lumbar region [M43.16]  Pseudoclaudication syndrome [M48.062]  Diagnosis Additional Information: No value filed.    Anesthesia Type:  General, ETT    Note:  Anesthesia Post Evaluation    Patient location during evaluation: PACU  Patient participation: Able to fully participate in evaluation  Level of consciousness: awake and alert  Pain management: adequate  Airway patency: patent  Cardiovascular status: acceptable and hemodynamically stable  Respiratory status: acceptable  Hydration status: euvolemic  PONV: none     Anesthetic complications: None          Last vitals:  Vitals:    01/07/20 1630 01/07/20 1640 01/07/20 1650   BP: 118/87 138/87 125/89   Pulse: 101 100 98   Resp: 15 20 16   Temp: 36.7  C (98  F)     SpO2: 94% 96% 92%         Electronically Signed By: Bruce Brown MD  January 7, 2020  4:57 PM

## 2020-01-07 NOTE — BRIEF OP NOTE
[facility]    Brief Operative Note    Pre-operative diagnosis: Degenerative Spondylolisthesis of lumbar region [M43.16], spinal stenosis, neurogenic claudication.  Pseudoclaudication syndrome [M48.062]  Post-operative diagnosis Same as pre-operative diagnosis    Procedure: Procedure(s):  L3,L4,L5 LAMINECTOMIES; POSTERIOR SPINAL FUSION; POSTERIOR LUMBAR INTERBODY FUSION L3-S1 USING INTERVERTEBRAL PROSTHESIS; LOCAL AUTOGRAFT AND INSTRUMENTATION.  Surgeon: Surgeon(s) and Role:     * Regan Collier MD - Primary     * Darlene Torres PA-C - Assisting  Anesthesia: General   Estimated blood loss: 600 mL  Drains: Jason-De Souza  Specimens: * No specimens in log *  Findings:   Severe spinal stenosis, degenerative spondylolisthesis, mild osteopenia.  Complications: None.  Implants:   Implant Name Type Inv. Item Serial No.  Lot No. LRB No. Used   GRAFT BONE MIX DBM 10ML Bone/Tissue/Biologic GRAFT BONE MIX DBM 10ML [id number] MUSCULOSKELETAL [place]ILLANUEVA  N/A 1   IMP SCR SPINE EVEREST PEDICLE 6.5X50MM TI THRD 2911-69218 Metallic Hardware/[place] IMP SCR SPINE EVEREST PEDICLE 6.5X50MM TI THRD 2911-35366  K2M [id number] N/A 5   IMP SCR SPINE EVEREST PEDICLE 6.5X45MM TI THRD 2911-79853 Metallic Hardware/[place] IMP SCR SPINE EVEREST PEDICLE 6.5X45MM TI THRD 2911-43763  K2M [id number] N/A 2   IMP SCR SET BONE EVEREST PA TI 2901-28659 Metallic Hardware/[place] IMP SCR SET BONE EVEREST PA TI 2901-63072  K2M [id number] N/A 8   IMP EVEREST SCREW SZ 6.5 X 40 MM     K2M [id number] N/A 1   ALEUTION CONVEX IMPLANT 11 X 28    K2M [id number] N/A 1   ALEUTION CONVEX IMPLANT 13 X 28    K2M [id number] N/A 1   ALEUTION CONVEX IMPLANT 12 X 28    K2M [id number] N/A 1   5.5 X 90 PRE CONTOURED PIETER    K2M [id number] N/A 2

## 2020-01-08 ENCOUNTER — APPOINTMENT (OUTPATIENT)
Dept: PHYSICAL THERAPY | Facility: CLINIC | Age: 70
DRG: 455 | End: 2020-01-08
Attending: ORTHOPAEDIC SURGERY
Payer: COMMERCIAL

## 2020-01-08 LAB
ANION GAP SERPL CALCULATED.3IONS-SCNC: 6 MMOL/L (ref 3–14)
BUN SERPL-MCNC: 9 MG/DL (ref 7–30)
CALCIUM SERPL-MCNC: 7.7 MG/DL (ref 8.5–10.1)
CHLORIDE SERPL-SCNC: 106 MMOL/L (ref 94–109)
CO2 SERPL-SCNC: 25 MMOL/L (ref 20–32)
CREAT SERPL-MCNC: 0.52 MG/DL (ref 0.52–1.04)
GFR SERPL CREATININE-BSD FRML MDRD: >90 ML/MIN/{1.73_M2}
GLUCOSE BLDC GLUCOMTR-MCNC: 132 MG/DL (ref 70–99)
GLUCOSE BLDC GLUCOMTR-MCNC: 146 MG/DL (ref 70–99)
GLUCOSE SERPL-MCNC: 157 MG/DL (ref 70–99)
HGB BLD-MCNC: 12.4 G/DL (ref 11.7–15.7)
POTASSIUM SERPL-SCNC: 4 MMOL/L (ref 3.4–5.3)
SODIUM SERPL-SCNC: 137 MMOL/L (ref 133–144)

## 2020-01-08 PROCEDURE — 97116 GAIT TRAINING THERAPY: CPT | Mod: GP

## 2020-01-08 PROCEDURE — 36415 COLL VENOUS BLD VENIPUNCTURE: CPT | Performed by: ORTHOPAEDIC SURGERY

## 2020-01-08 PROCEDURE — 25000132 ZZH RX MED GY IP 250 OP 250 PS 637: Performed by: PHYSICIAN ASSISTANT

## 2020-01-08 PROCEDURE — 80048 BASIC METABOLIC PNL TOTAL CA: CPT | Performed by: ORTHOPAEDIC SURGERY

## 2020-01-08 PROCEDURE — 00000146 ZZHCL STATISTIC GLUCOSE BY METER IP

## 2020-01-08 PROCEDURE — 99232 SBSQ HOSP IP/OBS MODERATE 35: CPT | Performed by: PHYSICIAN ASSISTANT

## 2020-01-08 PROCEDURE — 25000132 ZZH RX MED GY IP 250 OP 250 PS 637: Performed by: ORTHOPAEDIC SURGERY

## 2020-01-08 PROCEDURE — 12000000 ZZH R&B MED SURG/OB

## 2020-01-08 PROCEDURE — 85018 HEMOGLOBIN: CPT | Performed by: ORTHOPAEDIC SURGERY

## 2020-01-08 PROCEDURE — 97110 THERAPEUTIC EXERCISES: CPT | Mod: GP

## 2020-01-08 PROCEDURE — 97530 THERAPEUTIC ACTIVITIES: CPT | Mod: GP

## 2020-01-08 PROCEDURE — 97162 PT EVAL MOD COMPLEX 30 MIN: CPT | Mod: GP

## 2020-01-08 PROCEDURE — 25000128 H RX IP 250 OP 636: Performed by: ORTHOPAEDIC SURGERY

## 2020-01-08 RX ADMIN — HYDROMORPHONE HYDROCHLORIDE 0.5 MG: 1 INJECTION, SOLUTION INTRAMUSCULAR; INTRAVENOUS; SUBCUTANEOUS at 15:28

## 2020-01-08 RX ADMIN — OXYCODONE HYDROCHLORIDE 5 MG: 5 TABLET ORAL at 20:36

## 2020-01-08 RX ADMIN — ACETAMINOPHEN 975 MG: 325 TABLET, FILM COATED ORAL at 03:26

## 2020-01-08 RX ADMIN — CEFAZOLIN SODIUM 2 G: 2 INJECTION, SOLUTION INTRAVENOUS at 03:26

## 2020-01-08 RX ADMIN — MELATONIN 5000 UNITS: at 09:58

## 2020-01-08 RX ADMIN — Medication 1 CAPSULE: at 20:31

## 2020-01-08 RX ADMIN — POLYETHYLENE GLYCOL 3350 17 G: 17 POWDER, FOR SOLUTION ORAL at 09:59

## 2020-01-08 RX ADMIN — FLUTICASONE PROPIONATE 2 SPRAY: 50 SPRAY, METERED NASAL at 10:08

## 2020-01-08 RX ADMIN — HYDROMORPHONE HYDROCHLORIDE 0.3 MG: 1 INJECTION, SOLUTION INTRAMUSCULAR; INTRAVENOUS; SUBCUTANEOUS at 01:05

## 2020-01-08 RX ADMIN — CALCIUM POLYCARBOPHIL 1250 MG: 625 TABLET, FILM COATED ORAL at 09:59

## 2020-01-08 RX ADMIN — POTASSIUM CHLORIDE AND SODIUM CHLORIDE: 900; 150 INJECTION, SOLUTION INTRAVENOUS at 07:53

## 2020-01-08 RX ADMIN — POTASSIUM CHLORIDE 20 MEQ: 1.5 POWDER, FOR SOLUTION ORAL at 00:16

## 2020-01-08 RX ADMIN — MONTELUKAST 10 MG: 10 TABLET, FILM COATED ORAL at 10:00

## 2020-01-08 RX ADMIN — Medication 1 CAPSULE: at 09:58

## 2020-01-08 RX ADMIN — LORATADINE 10 MG: 10 TABLET ORAL at 10:00

## 2020-01-08 RX ADMIN — OXYCODONE HYDROCHLORIDE 5 MG: 5 TABLET ORAL at 16:35

## 2020-01-08 RX ADMIN — Medication 250 MG: at 20:31

## 2020-01-08 RX ADMIN — HYDROMORPHONE HYDROCHLORIDE 0.3 MG: 1 INJECTION, SOLUTION INTRAMUSCULAR; INTRAVENOUS; SUBCUTANEOUS at 08:24

## 2020-01-08 RX ADMIN — GUAIFENESIN AND DEXTROMETHORPHAN HYDROBROMIDE 1 TABLET: 600; 30 TABLET, EXTENDED RELEASE ORAL at 10:00

## 2020-01-08 RX ADMIN — POTASSIUM CHLORIDE AND SODIUM CHLORIDE: 900; 150 INJECTION, SOLUTION INTRAVENOUS at 20:48

## 2020-01-08 RX ADMIN — FLUTICASONE FUROATE AND VILANTEROL TRIFENATATE 1 PUFF: 100; 25 POWDER RESPIRATORY (INHALATION) at 10:08

## 2020-01-08 RX ADMIN — OMEPRAZOLE 20 MG: 20 CAPSULE, DELAYED RELEASE ORAL at 08:42

## 2020-01-08 RX ADMIN — SENNOSIDES AND DOCUSATE SODIUM 1 TABLET: 8.6; 5 TABLET ORAL at 09:58

## 2020-01-08 RX ADMIN — ACETAMINOPHEN 975 MG: 325 TABLET, FILM COATED ORAL at 18:12

## 2020-01-08 RX ADMIN — FOLIC ACID TAB 400 MCG 400 MCG: 400 TAB at 10:00

## 2020-01-08 RX ADMIN — HYDROMORPHONE HYDROCHLORIDE 0.5 MG: 1 INJECTION, SOLUTION INTRAMUSCULAR; INTRAVENOUS; SUBCUTANEOUS at 10:45

## 2020-01-08 RX ADMIN — ACETAMINOPHEN 975 MG: 325 TABLET, FILM COATED ORAL at 10:01

## 2020-01-08 ASSESSMENT — ACTIVITIES OF DAILY LIVING (ADL)
ADLS_ACUITY_SCORE: 15
ADLS_ACUITY_SCORE: 16
ADLS_ACUITY_SCORE: 16
PREVIOUS_RESPONSIBILITIES: MEAL PREP;HOUSEKEEPING;SHOPPING;MEDICATION MANAGEMENT;FINANCES;DRIVING
ADLS_ACUITY_SCORE: 14
ADLS_ACUITY_SCORE: 16
ADLS_ACUITY_SCORE: 14

## 2020-01-08 NOTE — PLAN OF CARE
POD 1 from a L3-S1 fusion, L3-5 lami. A&O x4. CMS: R sided numbness located in fingers and from knee to toes, L sided numbness from knee to toes. Pt states that numbness is improving. Bowel sounds hypoactive, - flatus, tolerating clear liquid diet. VSS on RA. Dressing CDI. BITA patent with adequate output. Pt has not been out of bed yet. Concepcion patent and draining adequately. Potassium replaced, recheck is 4.0. C/o  5/10 pain, decreased with prn 0.3 mg IV Dilaudid, and scheduled tylenol. Pt scoring green on the Aggression Stop Light Tool. Discharge plan pending nursing will continue to monitor.

## 2020-01-08 NOTE — PROGRESS NOTES
"   01/08/20 1106   Quick Adds   Type of Visit Initial PT Evaluation   Living Environment   Lives With spouse   Living Arrangements house   Home Accessibility stairs to enter home   Number of Stairs, Main Entrance   (2-3)   Stair Railings, Main Entrance none  (can put flat hand on side of house)   Living Environment Comment Sravanthi, all needs met main level; bathroom set up includes tub shower with shower chair; comfort height toilet- pt reports she has suction grab bar available to put in place.    Self-Care   Usual Activity Tolerance moderate   Current Activity Tolerance fair   Equipment Currently Used at Home cane, straight;shower chair;walker, rolling   Functional Level Prior   Ambulation 1-->assistive equipment  (used cane outside, walker outside hous \"once in a while\")   Transferring 0-->independent   Bathing 3-->assistive equipment and person  (spouse assists with tub transfer)   Prior Functional Level Comment Pt reports she does no use AD inside the house; uses cane outside the house or sometimes uses FWW outside the house. Spouse helps with tub transfer, pt reports she sometimes uses shower chair.   General Information   Onset of Illness/Injury or Date of Surgery - Date 01/07/20   Referring Physician Regan Collier MD   Patient/Family Goals Statement to go home   Pertinent History of Current Problem (include personal factors and/or comorbidities that impact the POC) Pt is 69 y.o. F POD#1 L3-L5 laminectomies, PSF L3-S1. PMH significant for R total shoulder in June of 2019 per pt. PMH also includes KRISTIN, hyperlipidemia, obesity, osteoarthritis, mild persistent asthma and prediabetes per hospitalist note.   Precautions/Limitations fall precautions;spinal precautions   General Info Comments Activity: Up with assist   Cognitive Status Examination   Orientation orientation to person, place and time   Level of Consciousness lethargic/somnolent   Follows Commands and Answers Questions 75% of the time;able to " "follow multistep instructions   Personal Safety and Judgment intact   Pain Assessment   Patient Currently in Pain Yes, see Vital Sign flowsheet  (2/10 at rest)   Integumentary/Edema   Integumentary/Edema Comments dressing appears intact, BITA drain noted   Posture    Posture Forward head position;Protracted shoulders   Range of Motion (ROM)   ROM Comment Spine ROM limited secondary to post-op status/precautions, LE ROM appears WFL with bed mobility and transfers. R shoulder limited somewhat due to post-op TSA   Strength   Strength Comments Pt demos functional LE weakness with transfers and ambulation, buckling at times, needing Mikael for support   Bed Mobility   Bed Mobility Comments Rolled L with Mikael and use of bed rail, sidelying>sit with HOB slightly elevated and ModA, with rest group home propped on elbow   Transfer Skills   Transfer Comments sit>stand with ModA and FWW   Gait   Gait Comments Ambulated in room with FWW and Mikael with slow pace, short steps, reduced clearance, difficulty progressing feet forward at times, with 2 instances of buckling needing Mikael for support.    Sensory Examination   Sensory Perception Comments numbness sides of legs from knees down, cramping L hamstring- feels improved somewhat since surgery \"I think so\"   Modality Interventions   Planned Modality Interventions Cryotherapy   General Therapy Interventions   Planned Therapy Interventions balance training;bed mobility training;gait training;strengthening;transfer training;home program guidelines;progressive activity/exercise   Clinical Impression   Criteria for Skilled Therapeutic Intervention yes, treatment indicated   PT Diagnosis difficulty ambulating   Influenced by the following impairments decreased strength, pain, decreased activity tolerance   Functional limitations due to impairments difficulty with bed mobility, transfers, ambulation and stairs   Clinical Presentation Evolving/Changing  (needs O2 monitoring throughout, weakness " "with buckling)   Clinical Presentation Rationale clinical judgement   Clinical Decision Making (Complexity) Moderate complexity  (level of assist, PMH including recent TSA)   Therapy Frequency 2x/day   Predicted Duration of Therapy Intervention (days/wks) 4 days   Anticipated Equipment Needs at Discharge   (pt owns FWW, cane)   Anticipated Discharge Disposition Home with Assist  (assist of spouse for bed mobility, transfers)   Risk & Benefits of therapy have been explained Yes   Patient, Family & other staff in agreement with plan of care Yes   Clinical Impression Comments Pt currently needing assist for all mobility & gait limited by pain & LE weakness with buckling noted. Anticipate pt will progress with continued physical therapy, med management in order for discharge home with assist of spouse. If pt does not progress as anticipate, may benefit from TCU.   [facility] [employer] \"6 Clicks\"   2016, Trustees of [facility], under license to [employer].  All rights reserved.   6 Clicks Short Forms Basic Mobility Inpatient Short Form   [facility] [employer]  \"6 Clicks\" V.2 Basic Mobility Inpatient Short Form   1. Turning from your back to your side while in a flat bed without using bedrails? 2 - A Lot   2. Moving from lying on your back to sitting on the side of a flat bed without using bedrails? 2 - A Lot   3. Moving to and from a bed to a chair (including a wheelchair)? 2 - A Lot   4. Standing up from a chair using your arms (e.g., wheelchair, or bedside chair)? 2 - A Lot   5. To walk in hospital room? 3 - A Little   6. Climbing 3-5 steps with a railing? 1 - Total   Basic Mobility Raw Score (Score out of 24.Lower scores equate to lower levels of function) 12   Total Evaluation Time   Total Evaluation Time (Minutes) 15     "

## 2020-01-08 NOTE — PROGRESS NOTES
[facility]    Medicine Progress Note - Hospitalist Service       Date of Admission:  1/7/2020  Assessment & Plan   Melissa Templeton is a 69-year-old female with a past medical history significant for obstructive sleep apnea, hyperlipidemia, obesity, osteoarthritis, asthma and prediabetes who is status post L3, L4, L5 laminectomy and posterior spinal fusion of L3 through S1.  The Hospitalist Service was consulted for postoperative medical co-management.     Status post L3, L4, and L5 laminectomy with posterior spinal fusion of L3 through S1  -  POD 1  - Routine post-op cares and pain control per Ortho     Mild persistent asthma:  The patient reports recent exacerbation treated with steroid taper and a Z-Nehemiah. Currently breathing feels well. No wheezing  - Continue PTA inhalers  - Encourage IS and ambulation    Obstructive sleep apnea:    -Continue CPAP per home settings.     Hypertension: [PTA regimen includes triamterene/hydrochlorothiazide 37.5-35 mg/d]  - Hold for now  - Resume on discharge    Hypokalemia, resolved:   - Resolved with repalcement       Diet: Advance Diet as Tolerated: Clear Liquid Diet    DVT Prophylaxis: Pneumatic Compression Devices  Concepcion Catheter: in place, indication: Other (Comment)(surgery < 24 hrs)  Code Status: Full Code      Disposition Plan   Expected discharge: Per primary service.   Entered: Jaclyn Negrete PA-C 01/08/2020, 11:26 AM       The patient's care was discussed with the Patient.    Jaclyn Negrete PA-C  Hospitalist Service  [facility]    ______________________________________________________________________    Interval History   Overall doing well. Breathing feels at baseline. Denies chest pain. No nausea. Only on clears thus far    Data reviewed today: I reviewed all medications, new labs and imaging results over the last 24 hours. I personally reviewed no images or EKG's today.    Physical Exam   Vital Signs: Temp: 98.8  F (37.1  C) Temp  src: Oral BP: 115/59 Pulse: 96 Heart Rate: 93 Resp: 18 SpO2: 90 % O2 Device: BiPAP/CPAP Oxygen Delivery: 3 LPM  Weight: 180 lbs 0 oz  Constitutional: Alert, resting comfortably in NAD  HEENT: Head normocephalic, atraumatic. Eyes sclera non icteric. Oropharynx clear and most  Respiratory: Normal effort, symmetric expansion, no crackles or wheezing  Cardiovascular: RRR no murmurs   GI: Non distended, normal bowels sounds, no tenderness or guarding  MSK: LE without edema. Dorsalis pedis pulse palpated bilaterally.   Skin/Integumen: Clear  Neuro: CN II-XII grossly intact  Psych:  Alert and oriented x 3. Normal affect      Data   Recent Labs   Lab 01/08/20  0446 01/07/20  0627   HGB 12.4  --      --    POTASSIUM 4.0 3.3*   CHLORIDE 106  --    CO2 25  --    BUN 9  --    CR 0.52  --    ANIONGAP 6  --    MATTI 7.7*  --    *  --      Recent Results (from the past 24 hour(s))   XR Lumbar Spine Port 2/3 Views    Narrative    XR PORTABLE LUMBAR SPINE TWO-THREE VIEWS  1/7/2020 1:10 PM     HISTORY: Lumbar fusion L3-S1 intraoperative film.    COMPARISON: None.      Impression    IMPRESSION: Bipedicular screws noted at the L3-L5 levels. Minimal  anterolisthesis of L3-L4 and L4-L5.    BEE HOLLIDAY MD   XR Lumbar Spine Port 2/3 Views    Narrative    XR PORTABLE LUMBAR SPINE TWO-THREE VIEWS  1/7/2020 3:21 PM     HISTORY: Lumbar posterior spinal fusion.    COMPARISON: January 7, 2020      Impression    IMPRESSION: Minimal anterolisthesis of L3 relative to L4 again  identified and unchanged. Bi-pedicular screw fixation from the L3-S1  levels with associated intervertebral disc spacers. Overall appearance  and alignment otherwise intact.    BEE HOLLIDAY MD

## 2020-01-08 NOTE — PLAN OF CARE
Pt transferred from PACU s/p posterior spinal laminectomy and fusion. Sleepy, arouses to voice. Oriented x4. VSS on 3 L O2 NC. CMS with 5/5 strength x4 extremities. Baseline numbness to fingers 4 and 5 of R hand, and to bilateral legs from knees to toes. Reports numbness has improved from preop. Lung sounds diminished. BITA patent, emptied 70 ml sanguinous drainage. Concepcion patent. Denies pain. Tolerating clear liquids.

## 2020-01-08 NOTE — PROGRESS NOTES
History:   Minimal complaints. Not using  Much pain medication, feels pain is less than preop.  Has not been OOB yet. Has  numbess below knees as preop, o/w  Denies leg symptoms. C/o heartburn.  Vitals:   B/P: 115/59, T: 98.8, P: 96, R: 18       Lab Results   Component Value Date     01/08/2020     11/17/2008     07/29/2004    Lab Results   Component Value Date    CHLORIDE 106 01/08/2020    CHLORIDE 105 11/17/2008    CHLORIDE 102 07/29/2004    Lab Results   Component Value Date    BUN 9 01/08/2020    BUN 19 11/17/2008    BUN 17 07/29/2004      Lab Results   Component Value Date    POTASSIUM 4.0 01/08/2020    POTASSIUM 3.3 01/07/2020    POTASSIUM 3.5 06/13/2019    Lab Results   Component Value Date    CO2 25 01/08/2020    CO2 26 11/17/2008    CO2 26 07/29/2004    Lab Results   Component Value Date    CR 0.52 01/08/2020    CR 0.71 06/14/2019    CR 0.70 01/24/2018        Lab Results   Component Value Date    WBC 6.1 11/17/2008    WBC 6.7 07/29/2004    HGB 12.4 01/08/2020    HGB 11.3 (L) 06/14/2019    HGB 11.3 (L) 01/25/2018    HCT 38.7 11/17/2008    HCT 46.5 07/29/2004    MCV 92 11/17/2008    MCV 93 07/29/2004     11/17/2008     07/29/2004         Intake/Output Summary (Last 24 hours) at 1/8/2020 0823  Last data filed at 1/8/2020 0800  Gross per 24 hour   Intake 4930 ml   Output 2530 ml   Net 2400 ml      Results for orders placed or performed during the hospital encounter of 01/07/20 (from the past 24 hour(s))   XR Lumbar Spine Port 1 View    Narrative    LUMBAR SPINE PORTABLE ONE VIEW 1/7/2020 9:15 AM     COMPARISON: None    HISTORY: Lumbar 3, 4, 5 laminectomies, lumbar 3-sacral 1 posterior  spinal fusion, OR 34, OR portable, [id number], AAG      Impression    IMPRESSION: A single crosstable lateral portable intraoperative view  of the lumbar spine demonstrates a single surgical instrument  projecting posterior to the level of the L4-L5 disc space. Grade 1  degenerative anterolisthesis  of L3 upon L4 and of L4 upon L5 is noted.  Alignment of the lumbar vertebrae is otherwise normal. Vertebral body  heights of the lumbar spine are normal.    DEJUAN GARCIA MD   XR Lumbar Spine Port 2/3 Views    Narrative    XR PORTABLE LUMBAR SPINE TWO-THREE VIEWS  1/7/2020 1:10 PM     HISTORY: Lumbar fusion L3-S1 intraoperative film.    COMPARISON: None.      Impression    IMPRESSION: Bipedicular screws noted at the L3-L5 levels. Minimal  anterolisthesis of L3-L4 and L4-L5.    BEE HOLLIDAY MD   XR Lumbar Spine Port 2/3 Views    Narrative    XR PORTABLE LUMBAR SPINE TWO-THREE VIEWS  1/7/2020 3:21 PM     HISTORY: Lumbar posterior spinal fusion.    COMPARISON: January 7, 2020      Impression    IMPRESSION: Minimal anterolisthesis of L3 relative to L4 again  identified and unchanged. Bi-pedicular screw fixation from the L3-S1  levels with associated intervertebral disc spacers. Overall appearance  and alignment otherwise intact.    BEE HOLLIDAY MD   Glucose by meter   Result Value Ref Range    Glucose 128 (H) 70 - 99 mg/dL   Glucose by meter   Result Value Ref Range    Glucose 146 (H) 70 - 99 mg/dL   Hemoglobin   Result Value Ref Range    Hemoglobin 12.4 11.7 - 15.7 g/dL   Basic metabolic panel   Result Value Ref Range    Sodium 137 133 - 144 mmol/L    Potassium 4.0 3.4 - 5.3 mmol/L    Chloride 106 94 - 109 mmol/L    Carbon Dioxide 25 20 - 32 mmol/L    Anion Gap 6 3 - 14 mmol/L    Glucose 157 (H) 70 - 99 mg/dL    Urea Nitrogen 9 7 - 30 mg/dL    Creatinine 0.52 0.52 - 1.04 mg/dL    GFR Estimate >90 >60 mL/min/[1.73_m2]    GFR Estimate If Black >90 >60 mL/min/[1.73_m2]    Calcium 7.7 (L) 8.5 - 10.1 mg/dL     Hemovac output: 180 cc since midnight.  Dressing:   Dry and intact.   Neuro:   Motor: 5/5   Sensation: decreased to L T in bilateral lower ext. As  preop.  Abdomen:  Destended, non tener  Extremities:   No swelling or calf tenderness  A/P:   Stable POD # 1   Mobilize.  Will start omeprozole for heatburn.

## 2020-01-08 NOTE — PLAN OF CARE
Discharge Planner PT   Patient plan for discharge: home; pt states her spouse has this week off from work, states he can take more time off if needed.    Current status: PT orders received, eval completed, treatment initiated. Pt is 69 y.o. F POD#1 L3-L5 laminectomies, PSF L3-S1. PMH significant for R total shoulder in June of 2019 per pt. Pt lives with spouse in house with 2-3 steps to enter with no rails. Bathroom includes tub shower with shower chair, comfort height toilet. Pt typically ambulates with no AD inside the home, uses cane or sometimes FWW outside.     Currently, pt received supine in bed, sleeping; roused by voice & agreeable to PT. Pt removed CPAP. Did have 3L O2 via CPAP, pt reports she does not use O2 at home. Pt lethargic with questions, SpO2 dropping to 86 at times, recovering to low-mid 90s with cues for breathing. Rolled L with Mikael and use of bed rail, sidelying>sit with HOB slightly elevated and ModA, with rest longterm propped on elbow. After seated rest, sit>stand with ModA and FWW. BP increased with supine>sit, stable with sit>stand. Ambulated 15' in room with FWW and Mikael. Pt with short step length, slow pace, decreased clearance, with 2 instances of buckling needing Mikael for support. Stand>sit at chair with FWW and CGA. After seated rest, sit>stand with FWW and ModA. Standing marching with instance of buckling needing Mikael for support, pt compensated with UE support after. Stand>sit with FWW and CGA at chair. SpO2 mid 80s-low 90s on RA during session, applied 2L O2 with SpO2 >90 after ambulation. Pt seated with all needs in reach, alarm on upon departure of PT.    Barriers to return to prior living situation: current level of assist, fall risk, stairs to enter, LE weakness with some buckling with ambulation  Recommendations for discharge: Home with assist of spouse for bed mobility, & transfers as needed, assist for household tasks  Rationale for recommendations: Pt currently needing  assist for all mobility & gait limited by pain & LE weakness with buckling noted. Anticipate pt will progress with continued physical therapy, med management in order for discharge home with assist of spouse. If pt does not progress as anticipate, may benefit from TCU.       Entered by: Queenie Tan 01/08/2020 11:56 AM   '

## 2020-01-08 NOTE — PLAN OF CARE
OT- Attempted OT evaluation and gathered subjective information. Pt then declined OOB activity reporting pain. RN notified.

## 2020-01-08 NOTE — PLAN OF CARE
"POD1 posterior spinal laminectomy and fusion. Alert and oriented x4. VSS on 2 L O2 NC or home cpap with O2. CMS with baseline numbness in fingers 4 and 5 of R hand and in BLEs from knees to feet. Pt reports numbness continues to improve. BLE strength 4-5/5, reports legs feel \"heavy\". Lung sounds diminished/clear. BS hypoactive, starting to pass flatus. Diet advanced to full liquid. Reports minimal pain, received IV dilaudid 2x and scheduled tylenol. Dressing on back CDI. BITA drain patent with 120 ml sanguinous drainage at 1200. BITA on/off suction hourly for 4 hours per order. Up to chair with asst of 1-2 GB and walker.   "

## 2020-01-08 NOTE — CONSULTS
Consult Date:  01/07/2020      REQUESTING PROVIDER:  Dr. Regan Collier.      REASON FOR CONSULTATION:  Medical co-management.      HISTORY OF PRESENT ILLNESS:  Melissa Templeton is a 69-year-old female with a past medical history significant for KRISTIN, hyperlipidemia, obesity, osteoarthritis, mild persistent asthma and prediabetes who is postoperative day 0 status post L3, L4, and L5 laminectomy and posterior spinal fusion L3 to L4, L4 to L5 and L5 to S1 for degenerative spondylolisthesis, spinal stenosis and neurogenic claudication.  The procedure was performed by Dr. Collier under general anesthesia with an estimated blood loss of 600 mL.  The Hospitalist Service was consulted for postoperative medical co-management.  The patient is presently evaluated in her room on the 7th floor.  She reports she is doing well after her surgery.  She currently denies any pain.  She denies nausea or vomiting and is tolerating a clear liquid diet.  She has no chest pain or shortness of breath.  She does have an occasional congested cough since extubation.  She has a Concepcion catheter in place.  She tells me she was recently treated for asthma exacerbation and ear pain by her primary care provider with prednisone taper and Z-Nehemiah.  She finished her prednisone taper 6 days prior to admission.  She reports her breathing has been baseline and denies any recent fevers, chills or shortness of breath.      REVIEW OF SYSTEMS:  A 10-point review of systems was conducted and is negative aside from the information in the HPI.      PAST MEDICAL HISTORY:   1.  KRISTIN on CPAP.   2.  Hyperlipidemia.   3.  Obesity.   4.  Osteoarthritis.   5.  Mild persistent asthma, denies hospitalizations in the past.  Uses albuterol a few days a week in the morning when she remembers.  Recent exacerbation as discussed above.   6.  Hypertension.   7.  Prediabetes:  The patient's recent hemoglobin A1c was 6.5.  She reports she actually has episodes of hypoglycemia at home that  have caused her to lose consciousness in the past.  She said she is always symptomatic with these and they do not occur very often.  She denies being told that she has diabetes and is not on any medications.       PAST SURGICAL  HISTORY:    Past Surgical History:   Procedure Laterality Date     APPENDECTOMY       ARTHROPLASTY KNEE Right 1/23/2018    Procedure: ARTHROPLASTY KNEE;  RIGHT TOTAL KNEE ARTHROPLASTY    EBL: 5mL;  Surgeon: Ambar Wilson MD;  Location: [facility] OR     ARTHROPLASTY SHOULDER Right 6/13/2019    Procedure: RIGHT TOTAL SHOULDER ARTHROPLASTY REVERSE (TORNIER)^;  Surgeon: Ambar Wilson MD;  Location:  OR     BACK SURGERY       C NONSPECIFIC PROCEDURE      Hysterectomy fibroids - bilat S&O     C NONSPECIFIC PROCEDURE  4-98    Discectomy L5-S1     C NONSPECIFIC PROCEDURE  1990    Cosmetic below eyes     CHOLECYSTECTOMY       COLONOSCOPY       EYE SURGERY       GYN SURGERY      MARYJO BSO     LAMINECTOMY, FUSION LUMBAR THREE+ LEVEL, COMBINED N/A 1/7/2020    Procedure: L3,L4,L5 LAMINECTOMIES; POSTERIOR SPINAL FUSION; POSTERIOR LUMBAR INTERBODY FUSION L3-S1 USING INTERVERTEBRAL PROSTHESIS; LOCAL AUTOGRAFT AND INSTRUMENTATION.;  Surgeon: Regan Collier MD;  Location:  OR     ORTHOPEDIC SURGERY      dulce. rotator cuffs     ORTHOPEDIC SURGERY      lumbar laminectomy           ALLERGIES:   1.  MAGNESIUM CITRATE.   2.  ERYTHROMYCIN.   3.  HMG-COA REDUCTASE INHIBITORS.   4.  NICKEL.      PRIOR TO ADMISSION MEDICATIONS:    Medications Prior to Admission   Medication Sig Dispense Refill Last Dose     acetaminophen (TYLENOL 8 HOUR ARTHRITIS PAIN) 650 MG CR tablet Take 650 mg by mouth 3 times daily as needed for mild pain or fever   12/31/2019 at Unknown     acetaminophen (TYLENOL) 500 MG tablet Take 500 mg by mouth 3 times daily as needed for mild pain   1/7/2020 at 0500     albuterol (PROAIR HFA/PROVENTIL HFA/VENTOLIN HFA) 108 (90 BASE) MCG/ACT Inhaler Inhale 2 puffs into the lungs 2 times daily as  needed for shortness of breath / dyspnea or wheezing    1/7/2020 at 0500     aspirin 81 MG EC tablet Take 81 mg by mouth daily   12/24/2019 at Unknown     cephALEXin (KEFLEX) 500 MG capsule Take 1,000 mg by mouth 2 times daily as needed (before and after dental procedure)   1+ month at Unknown     cholecalciferol (VITAMIN D3) 125 MCG (5000 UT) TABS tablet Take 5,000 Units by mouth daily   1/6/2020 at AM     dextromethorphan-guaiFENesin (MUCINEX DM)  MG 12 hr tablet Take 1 tablet by mouth every morning   1/6/2020 at AM     diclofenac (VOLTAREN) 1 % GEL topical gel Place onto the skin 2 times daily as needed for moderate pain    12/30/2019 at Unknown     FIBER ADULT GUMMIES PO Take 2 chew tab by mouth every morning (FIBERWELL)    1/6/2020 at AM     fluticasone-vilanterol (BREO ELLIPTA) 100-25 MCG/INH inhaler Inhale 1 puff into the lungs daily    1/7/2020 at 0500     folic acid (FOLVITE) 400 MCG tablet Take 400 mcg by mouth daily   1/6/2020 at AM     lidocaine (LIDODERM) 5 % Patch Place 1 patch onto the skin daily as needed for moderate pain   12/30/2019 at Unknown     loratadine (CLARITIN) 10 MG tablet Take 10 mg by mouth daily    1/6/2020 at AM     magnesium gluconate (MAGONATE) 250 MG tablet Take 250 mg by mouth every evening   1/6/2020 at PM     meloxicam (MOBIC) 15 MG tablet Take 15 mg by mouth every evening   12/31/2019 at Unkown     Menthol, Topical Analgesic, (BIOFREEZE ROLL-ON) 4 % GEL Apply topically 2 times daily as needed (pain)   1/5/2020 at PM     Misc Natural Products (GLUCOS-CHONDROIT-MSM COMPLEX PO) Take 1 tablet by mouth every evening   12/24/2019 at PM     mometasone (NASONEX) 50 MCG/ACT nasal spray Spray 2 sprays into both nostrils daily   1/7/2020 at 0500     Montelukast Sodium (SINGULAIR PO) Take 10 mg by mouth every morning    1/6/2020 at AM     Multiple Vitamins-Minerals (PRESERVISION AREDS 2 PO) Take 1 capsule by mouth 2 times daily   1/6/2020 at PM     Omega-3 Fatty Acids (FISH OIL PO)  Take 1,200 mg by mouth daily   1+ month at Unknown     OVER-THE-COUNTER Take 1 Container by mouth every morning Mixed with orange juice. Vitamin supplement called Zipfizz.   1/6/2020 at AM     Prenatal Vit-Fe Fumarate-FA (PRENATAL 19) 29-1 MG CHEW Take 1 chew tab by mouth every evening    1/6/2020 at PM     pseudoePHEDrine (SUDOGEST 12 HOUR) 120 MG 12 hr tablet Take 120 mg by mouth At Bedtime    1/6/2020 at 2300     senna-docusate (SENOKOT-S;PERICOLACE) 8.6-50 MG per tablet Take 1-2 tablets by mouth 2 times daily (Patient taking differently: Take 2 tablets by mouth At Bedtime ) 40 tablet 0 1/5/2020 at HS     triamterene-HCTZ (DYAZIDE) 37.5-25 MG capsule Take 1 capsule by mouth every morning   1/6/2020 at AM     order for DME Equipment being ordered: Walker Wheels () and Walker ()  Treatment Diagnosis: difficulty with gait 1 each 0      ORDER FOR DME Equipment being ordered: rt short cam walker 1 each 0           SOCIAL HISTORY:  The patient reports drinking approximately 2 alcoholic beverages per week.  Denies drug use.  She is a former smoker who smoked at least a pack a day for 30 years and quit in 1991.  She works as a para.      FAMILY HISTORY:  Her mother has history of diabetes and she has a sister with coronary artery disease.      LABORATORY DATA:  Preoperative creatinine 0.71, hemoglobin 15, K 3.3.  Glucose this afternoon is 128.      PHYSICAL EXAMINATION:   VITAL SIGNS:  Temperature 98 degrees, heart rate 93, blood pressure 142/72, respiratory rate 18, oxygen saturation is 97% on 3 liters.   GENERAL:  Alert and oriented x 4, sitting up in bed, appears comfortable and is appropriately conversant.   HEENT:  Sclerae anicteric, EOMI.   ENT:  Mucous membranes are moist.   CARDIOVASCULAR:  Regular rate and rhythm, no murmurs appreciated.   RESPIRATORY:  She has occasional mild expiratory wheezing bilaterally, no increased work of breathing.   GASTROINTESTINAL:  Positive bowel sounds.  Abdomen is soft  and nontender.   GENITOURINARY:  Concepcion catheter in place.   MUSCULOSKELETAL:  The patient moves all 4 extremities.   NEUROLOGIC:  Speech is clear.  Face symmetric.  She reports some bilateral lower extremity numbness, stable from prior to surgery.  Speech is clear.  Face symmetric.      ASSESSMENT AND PLAN:  Melissa Templeton is a 69-year-old female with a past medical history significant for obstructive sleep apnea, hyperlipidemia, obesity, osteoarthritis, asthma and prediabetes who is postoperative day 0 status post L3, L4, L5 laminectomy and posterior spinal fusion of L3 through S1.  The Hospitalist Service was consulted for postoperative medical co-management.   1.  Postoperative day 0 status post L3, L4, and L5 laminectomy with posterior spinal fusion of L3 through S1 for degenerative spondylolisthesis, spinal stenosis and neurogenic claudication:  The procedure was performed under general anesthesia with an estimated blood loss of 600 mL.  The patient is hemodynamically stable postoperatively.   -- Primary management is per Surgery including DVT prophylaxis and pain control.   -- She is on normal saline plus 20 mEq of potassium chloride at 75 mL per hour.  This should be discontinued when she is tolerating adequate p.o. intake.   -- Has a scopolamine patch in place.   -- Hemoglobin and BMP in the a.m.   -- PT, OT.     2.  Obstructive sleep apnea:  Continue CPAP per home settings.   3.  Hypertension:  I will hold prior to admission triamterene/hydrochlorothiazide.  Can resume tomorrow if indicated.   3.  Mild persistent asthma:  The patient reports recent exacerbation treated with steroid taper and a Z-Nehemiah.  She has mild bilateral expiratory wheezing at present and is on 3 liters nasal cannula with adequate oxygenation.  No increased work of breathing.   -- Continue prior to admission Singulair.   -- Albuterol inhaler and nebulizer treatments available p.r.n.     -- Recommend continuous pulse oximetry and  capnography per protocol overnight.   -- Can continue her prior to admission Claritin and Mucinex DM.   -- Continue PTA Breo Ellipta.     4.  Hypokalemia:  Potassium replacement protocol has been ordered.      Hospitalist Service will continue along.  We appreciate the consultation.      The patient was seen and examined with Dr. Tio Forrester who agrees with the above plan.         TIO FORRESTER MD       As dictated by ANG GARAY PA-C            D: 2020   T: 2020   MT:       Name:     LATONIA MARSH   MRN:      [id number]        Account:       [id number]   :      1950           Consult Date:  2020      Document: [id number]       cc: Regan Collier MD

## 2020-01-09 ENCOUNTER — APPOINTMENT (OUTPATIENT)
Dept: PHYSICAL THERAPY | Facility: CLINIC | Age: 70
DRG: 455 | End: 2020-01-09
Attending: ORTHOPAEDIC SURGERY
Payer: COMMERCIAL

## 2020-01-09 ENCOUNTER — APPOINTMENT (OUTPATIENT)
Dept: OCCUPATIONAL THERAPY | Facility: CLINIC | Age: 70
DRG: 455 | End: 2020-01-09
Attending: ORTHOPAEDIC SURGERY
Payer: COMMERCIAL

## 2020-01-09 LAB — HGB BLD-MCNC: 11.9 G/DL (ref 11.7–15.7)

## 2020-01-09 PROCEDURE — 25000128 H RX IP 250 OP 636: Performed by: ORTHOPAEDIC SURGERY

## 2020-01-09 PROCEDURE — 97530 THERAPEUTIC ACTIVITIES: CPT | Mod: GP

## 2020-01-09 PROCEDURE — 25000132 ZZH RX MED GY IP 250 OP 250 PS 637: Performed by: ORTHOPAEDIC SURGERY

## 2020-01-09 PROCEDURE — 99232 SBSQ HOSP IP/OBS MODERATE 35: CPT | Performed by: PHYSICIAN ASSISTANT

## 2020-01-09 PROCEDURE — 12000000 ZZH R&B MED SURG/OB

## 2020-01-09 PROCEDURE — 97530 THERAPEUTIC ACTIVITIES: CPT | Mod: GO

## 2020-01-09 PROCEDURE — 97116 GAIT TRAINING THERAPY: CPT | Mod: GP

## 2020-01-09 PROCEDURE — 97535 SELF CARE MNGMENT TRAINING: CPT | Mod: GO

## 2020-01-09 PROCEDURE — 85018 HEMOGLOBIN: CPT | Performed by: ORTHOPAEDIC SURGERY

## 2020-01-09 PROCEDURE — 36415 COLL VENOUS BLD VENIPUNCTURE: CPT | Performed by: ORTHOPAEDIC SURGERY

## 2020-01-09 PROCEDURE — 99207 ZZC CDG-MDM COMPONENT: MEETS MODERATE - UP CODED: CPT | Performed by: PHYSICIAN ASSISTANT

## 2020-01-09 PROCEDURE — 97165 OT EVAL LOW COMPLEX 30 MIN: CPT | Mod: GO

## 2020-01-09 RX ADMIN — ACETAMINOPHEN 975 MG: 325 TABLET, FILM COATED ORAL at 03:12

## 2020-01-09 RX ADMIN — OXYCODONE HYDROCHLORIDE 5 MG: 5 TABLET ORAL at 07:07

## 2020-01-09 RX ADMIN — SENNOSIDES AND DOCUSATE SODIUM 2 TABLET: 8.6; 5 TABLET ORAL at 08:50

## 2020-01-09 RX ADMIN — ONDANSETRON 4 MG: 4 TABLET, ORALLY DISINTEGRATING ORAL at 17:22

## 2020-01-09 RX ADMIN — MONTELUKAST 10 MG: 10 TABLET, FILM COATED ORAL at 08:50

## 2020-01-09 RX ADMIN — GUAIFENESIN AND DEXTROMETHORPHAN HYDROBROMIDE 1 TABLET: 600; 30 TABLET, EXTENDED RELEASE ORAL at 08:50

## 2020-01-09 RX ADMIN — FOLIC ACID TAB 400 MCG 400 MCG: 400 TAB at 08:50

## 2020-01-09 RX ADMIN — BISACODYL 10 MG: 10 SUPPOSITORY RECTAL at 17:21

## 2020-01-09 RX ADMIN — FLUTICASONE FUROATE AND VILANTEROL TRIFENATATE 1 PUFF: 100; 25 POWDER RESPIRATORY (INHALATION) at 08:51

## 2020-01-09 RX ADMIN — ACETAMINOPHEN 975 MG: 325 TABLET, FILM COATED ORAL at 18:39

## 2020-01-09 RX ADMIN — FLUTICASONE PROPIONATE 2 SPRAY: 50 SPRAY, METERED NASAL at 08:50

## 2020-01-09 RX ADMIN — MELATONIN 5000 UNITS: at 08:50

## 2020-01-09 RX ADMIN — OXYCODONE HYDROCHLORIDE 5 MG: 5 TABLET ORAL at 00:39

## 2020-01-09 RX ADMIN — OXYCODONE HYDROCHLORIDE 5 MG: 5 TABLET ORAL at 07:54

## 2020-01-09 RX ADMIN — Medication 1 CAPSULE: at 20:01

## 2020-01-09 RX ADMIN — LORATADINE 10 MG: 10 TABLET ORAL at 08:50

## 2020-01-09 RX ADMIN — Medication 250 MG: at 20:02

## 2020-01-09 RX ADMIN — OXYCODONE HYDROCHLORIDE 5 MG: 5 TABLET ORAL at 03:12

## 2020-01-09 RX ADMIN — Medication 1 CAPSULE: at 08:50

## 2020-01-09 RX ADMIN — CALCIUM POLYCARBOPHIL 1250 MG: 625 TABLET, FILM COATED ORAL at 08:50

## 2020-01-09 RX ADMIN — ACETAMINOPHEN 975 MG: 325 TABLET, FILM COATED ORAL at 10:50

## 2020-01-09 RX ADMIN — SENNOSIDES AND DOCUSATE SODIUM 1 TABLET: 8.6; 5 TABLET ORAL at 20:01

## 2020-01-09 RX ADMIN — OXYCODONE HYDROCHLORIDE 5 MG: 5 TABLET ORAL at 14:22

## 2020-01-09 RX ADMIN — POLYETHYLENE GLYCOL 3350 17 G: 17 POWDER, FOR SOLUTION ORAL at 08:49

## 2020-01-09 RX ADMIN — OMEPRAZOLE 20 MG: 20 CAPSULE, DELAYED RELEASE ORAL at 07:07

## 2020-01-09 ASSESSMENT — ACTIVITIES OF DAILY LIVING (ADL)
ADLS_ACUITY_SCORE: 16
ADLS_ACUITY_SCORE: 16
ADLS_ACUITY_SCORE: 14
ADLS_ACUITY_SCORE: 16

## 2020-01-09 NOTE — PLAN OF CARE
Discharge Planner OT   Patient plan for discharge: home w/ A from [facility]  Current status: OT eval/tx initiated. Currently, pt CGA and VC's for transfers, CGA for functional mobility to/from restroom. After training, pt donned/doffed socks w/ reacher and sockaide w/ min A. Pt required mod A to pull up/down undergarments but pulled over feet using reacher w/ VC's. Max A for toileting for pericare and clothing mgmt. Pt too fatigued/weak to stand for g/h- sat on commode at sink for oral-cares. Mod A required for sit>supine to lift BLE's. 02 stable w/ pt on 3L 02 throughout.  Barriers to return to prior living situation: current level of A required  Recommendations for discharge: changed to TCU  Rationale for recommendations: Changed rec to TCU 2' surgeon's note stating pt will likely discharge this afternoon. Pt would benefit from continued therapy to improve ind/safety w/ ADL's, mobility, and IADL's prior to returning home.     If pt does return home, pt would require A x 1 w/ all mobility and ADL's/IADL's and HH OT/PT.        Entered by: Martha Esquivel 01/09/2020 10:17 AM

## 2020-01-09 NOTE — PROGRESS NOTES
History:   Has incisional pain only.  Pre-op leg symptoms much better.  Getting only full liq. Diet.  Passing some flatus but no BM yet. Ambulating short distances with much assistance.  Vitals:   B/P: 132/73, T: 98.5, P: 96, R: 16       Lab Results   Component Value Date     01/08/2020     11/17/2008     07/29/2004    Lab Results   Component Value Date    CHLORIDE 106 01/08/2020    CHLORIDE 105 11/17/2008    CHLORIDE 102 07/29/2004    Lab Results   Component Value Date    BUN 9 01/08/2020    BUN 19 11/17/2008    BUN 17 07/29/2004      Lab Results   Component Value Date    POTASSIUM 4.0 01/08/2020    POTASSIUM 3.3 01/07/2020    POTASSIUM 3.5 06/13/2019    Lab Results   Component Value Date    CO2 25 01/08/2020    CO2 26 11/17/2008    CO2 26 07/29/2004    Lab Results   Component Value Date    CR 0.52 01/08/2020    CR 0.71 06/14/2019    CR 0.70 01/24/2018        Lab Results   Component Value Date    WBC 6.1 11/17/2008    WBC 6.7 07/29/2004    HGB 12.4 01/08/2020    HGB 11.3 (L) 06/14/2019    HGB 11.3 (L) 01/25/2018    HCT 38.7 11/17/2008    HCT 46.5 07/29/2004    MCV 92 11/17/2008    MCV 93 07/29/2004     11/17/2008     07/29/2004         Intake/Output Summary (Last 24 hours) at 1/9/2020 0743  Last data filed at 1/9/2020 0655  Gross per 24 hour   Intake 120 ml   Output 2310 ml   Net -2190 ml      Results for orders placed or performed during the hospital encounter of 01/07/20 (from the past 24 hour(s))   Glucose by meter   Result Value Ref Range    Glucose 132 (H) 70 - 99 mg/dL     Hemovac output: 130 cc since midnight, serosanguinous.  Dressing:   Dry and intact.   Neuro:   Motor: 4/5 in hip flexors o/w 5/5   Sensation: decreased below knees better than preop  Abdomen:  Distended and tympanitic, BS active by report  Extremities:   No swelling or calf tenderness  A/P:   Stable POD # 2   Progressing slowly will likely need ST rehab stay, Soc Serv consulted.  Rectal tube prn. Has multiple  bowel meds ordered.   BITA still with sig output but thin will take off suction and likely discharge this afternoon.

## 2020-01-09 NOTE — PLAN OF CARE
POD 2 from a L3-4 fusion/lami. A&O. CMS intact except numbness in BLE and numbness to R 4/5th finger. Bowel sounds hypoactive, passing flatus, tolerating full liquid diet. VSS except needing 3 LPM O2 with CPAP. Dressing CDI. BITA with 130 ml serosanguineous drainage. Did not get OOB during shift, assisted with weight shifting. C/o moderate back pain, decreased with scheduled tylenol an 5mg oxycodone.  Carlene burns'charo in AM dtv.  Pt scoring green on the Aggression Stop Light Tool. Plan to continue to work with therapies.

## 2020-01-09 NOTE — PROGRESS NOTES
01/08/20 1507   Quick Adds   Type of Visit Initial Occupational Therapy Evaluation   Living Environment   Lives With spouse   Living Arrangements house  (rambler style home )   Home Accessibility stairs to enter home   Number of Stairs, Main Entrance   (2-3)   Transportation Anticipated family or friend will provide  (Pt typically drives )   Living Environment Comment Sravanthi, all needs met main level; bathroom set up includes tub shower with shower chair; comfort height toilet- pt reports she has suction grab bar available to put in place. Spouse able to as needed, however works full time.    Self-Care   Usual Activity Tolerance good   Current Activity Tolerance moderate   Equipment Currently Used at Home cane, straight;shower chair;walker, rolling   Activity/Exercise/Self-Care Comment Pt reported that she does not use an AD in the home, however uses a walker outside of the home.    Functional Level   Ambulation 1-->assistive equipment   Transferring 0-->independent   Toileting 0-->independent   Bathing 3-->assistive equipment and person   Dressing 0-->independent   Eating 0-->independent   Communication 0-->understands/communicates without difficulty   Swallowing 0-->swallows foods/liquids without difficulty   Cognition 0 - no cognition issues reported   Fall history within last six months yes   Number of times patient has fallen within last six months 1   Which of the above functional risks had a recent onset or change? transferring;ambulation;toileting;bathing;dressing   Prior Functional Level Comment Pt has her  A w/ tub transfer and he supervises while pt is showering. Pt otherwise ind w/ ADL's and mobility PTA   General Information   Onset of Illness/Injury or Date of Surgery - Date 01/07/20   Referring Physician Regan Collier MD   Patient/Family Goals Statement Home    Additional Occupational Profile Info/Pertinent History of Current Problem Per chart: Melissa Templeton is a 69-year-old female  with a past medical history significant for obstructive sleep apnea, hyperlipidemia, obesity, osteoarthritis, asthma and prediabetes who is status post L3, L4, L5 laminectomy and posterior spinal fusion of L3 through S1. PMH significant for R total shoulder in June of 2019    Precautions/Limitations fall precautions;spinal precautions   Cognitive Status Examination   Orientation orientation to person, place and time   Level of Consciousness alert   Sensory Examination   Sensory Quick Adds   (Numbness and tingling in BLE's)   Pain Assessment   Patient Currently in Pain No  (none at rest)   Range of Motion (ROM)   ROM Comment BUE AROM WFL of spinal precautions   Strength   Strength Comments BUE strength not formally assessed but was functional during transfers   Mobility   Bed Mobility Comments mod A for sit>sup   Transfer Skill: Sit to Stand   Level of Adams: Sit/Stand contact guard   Bathing   Level of Adams moderate assist (50% patients effort)   Upper Body Dressing   Level of Adams: Dress Upper Body minimum assist (75% patients effort)   Lower Body Dressing   Level of Adams: Dress Lower Body moderate assist (50% patients effort)   Toileting   Level of Adams: Toilet moderate assist (50% patients effort)   Grooming   Level of Adams: Grooming contact guard   Instrumental Activities of Daily Living (IADL)   Previous Responsibilities meal prep;housekeeping;shopping;medication management;finances;driving   Activities of Daily Living Analysis   Impairments Contributing to Impaired Activities of Daily Living ROM decreased;strength decreased;pain;post surgical precautions   General Therapy Interventions   Planned Therapy Interventions ADL retraining;IADL retraining   Clinical Impression   Criteria for Skilled Therapeutic Interventions Met yes, treatment indicated   OT Diagnosis Decreased ind/safety w/ ADL's and IADL's   Influenced by the following impairments Spinal precautions,  "new use of A.D., decreased strength, functional act tolerance, new 02 needs   Assessment of Occupational Performance 1-3 Performance Deficits   Identified Performance Deficits Decreased ind/safety w/ bed mobility, functional transfers, functional mobility, dressing, bathing, toileting, g/h, all IADL's   Clinical Decision Making (Complexity) Moderate complexity   Therapy Frequency Daily   Predicted Duration of Therapy Intervention (days/wks) 4 days   Anticipated Discharge Disposition Home with Assist  (or TCU)   Risks and Benefits of Treatment have been explained. Yes   Patient, Family & other staff in agreement with plan of care Yes   [facility] TM \"6 Clicks\"   2016, Trustees of [facility], under license to [employer]).  All rights reserved.   6 Clicks Short Forms Daily Activity Inpatient Short Form   [facility]  \"6 Clicks\" Daily Activity Inpatient Short Form   1. Putting on and taking off regular lower body clothing? 2 - A Lot   2. Bathing (including washing, rinsing, drying)? 2 - A Lot   3. Toileting, which includes using toilet, bedpan or urinal? 2 - A Lot   4. Putting on and taking off regular upper body clothing? 3 - A Little   5. Taking care of personal grooming such as brushing teeth? 3 - A Little   6. Eating meals? 4 - None   Daily Activity Raw Score (Score out of 24.Lower scores equate to lower levels of function) 16   Total Evaluation Time   Total Evaluation Time (Minutes) 8     "

## 2020-01-09 NOTE — PLAN OF CARE
[id number]  Pt here with spinal stenosis. POD 1 L3,4 laminectomy and post spinal fusion L3-S1. A&Ox4. Neuros intact, CMS intact ex baseline numbness in R digits 4&5 and numbness in BE from knees to feet. VSS on 2L O2 home CPAP. IVF running at 75. Full liquid diet, thin liquids. Takes pills whole. Up with 2, GB and walker. Oxycodone given for pain x1. Carlene patent, BITA patent. Pt scoring green on the Aggression Stop Light Tool.

## 2020-01-09 NOTE — PROVIDER NOTIFICATION
Left voicemail for Dr Collier about JPoutput of 30cc for day shift. Awaiting further orders. Also called Dr Colliers office to update them.

## 2020-01-09 NOTE — PLAN OF CARE
Discharge Planner PT   Patient plan for discharge: Home, states spouse can take time off work to be at home with her  Current status: Pt supine in bed upon arrival, agreeable to PT. On RA upon arrival, having taken off CPAP to eat, SpO2 86-88 on RA, Applied 3L O2 via NC for session. Pt needs ModA for supine>sit from flat bed with use of rail. CGA for sit>stand from bed with FWW- slight tendency for twist on initial trial, improved with cues and repetition. Ambulated 60' with FWW and CGA with multiple brief standing rest breaks, leaning on window sill for 1 standing rest break. Upon return to room, 1x sit<>stand from bed and 5 x sit<>stand with CGA-close SBA from chair with emphasis on arm placement and avoiding twisting. Pt seated in chair upon departure, all needs in reach, RN updated.  Barriers to return to prior living situation: current level of assist, current/new O2 needs  Recommendations for discharge: Changed to TCU  Rationale for recommendations: Pt demos gradual progress today, though continues to need assist for all mobility. Per Surgeon's note, pt with likely discharge this afternoon. Pt would benefit continued skilled therapies in TCU to progress with independence and safety with mobility prior to returning home. If pt were to return home, would need Ax1 for bed, mobility, transfers & short distance gait and home PT.        Entered by: Queenie Tan 01/09/2020 8:48 AM

## 2020-01-09 NOTE — CONSULTS
Care Transition Initial Assessment - SW     Met with: PATIENT  Active Problems:    Spinal stenosis of lumbar region with neurogenic claudication       DATA  Lives With: spouse   Living Arrangements: house(rambler style home )     Description of Support System: Supportive, Involved  Who is your support system?:   Support Assessment: Adequate family and caregiver support, Adequate social supports.   Identified issues/concerns regarding health management: Need for increased services at time of discharge.  Transportation Anticipated: family or friend will provide(Pt typically drives )  ASSESSMENT  Cognitive Status:  Awake, alert, oriented  Concerns to be addressed: Discharge planning.    SW reviewed chart and met with patient to discuss discharge planning. Pt was admitted 1/7/2020 with Spondylolisthesis of lumbar region. Anticipated discharge date: 1/10-1/11. SW introduced self and role. 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 Pt states her [employer] is primary over her Medicare. We reviewed therapy recommendations for: TCU.   (Pt had been recommending home w/spouse assist, but has recently changed their recommendation). Pt in agreement with TCU asking SW to send referrals to [facility] or [facility], requesting a private room-agreeing to pay the prvt room fee. SW sent referrals thru DOD. We did not discuss transportation at this time. Pt currently on 02.     PLAN  Financial costs for the patient includes: Possible transport costs, if applicable .  Patient given options and choices for discharge: Yes .  Patient/family is agreeable to the plan?  Yes  Transportation/person available to transport on day of discharge  is TBD  Patient Goals and Preferences: Discharge to TCU .  Patient anticipates  discharging to:  TCU .    Continue to assist as needed to ensure a safe discharge.    ELLIE Bell   [facility]  [phone]    UPDATE@1527: Summer sent for auth, which they have received from [facility]. Per Summer, they have a private room for patient tomorrow (can also accept Sat/Sunday)

## 2020-01-10 ENCOUNTER — APPOINTMENT (OUTPATIENT)
Dept: GENERAL RADIOLOGY | Facility: CLINIC | Age: 70
DRG: 455 | End: 2020-01-10
Attending: PHYSICIAN ASSISTANT
Payer: COMMERCIAL

## 2020-01-10 ENCOUNTER — APPOINTMENT (OUTPATIENT)
Dept: PHYSICAL THERAPY | Facility: CLINIC | Age: 70
DRG: 455 | End: 2020-01-10
Attending: ORTHOPAEDIC SURGERY
Payer: COMMERCIAL

## 2020-01-10 VITALS
DIASTOLIC BLOOD PRESSURE: 85 MMHG | OXYGEN SATURATION: 92 % | BODY MASS INDEX: 36.29 KG/M2 | RESPIRATION RATE: 16 BRPM | TEMPERATURE: 98.1 F | HEIGHT: 59 IN | HEART RATE: 96 BPM | SYSTOLIC BLOOD PRESSURE: 140 MMHG | WEIGHT: 180 LBS

## 2020-01-10 PROCEDURE — 71046 X-RAY EXAM CHEST 2 VIEWS: CPT

## 2020-01-10 PROCEDURE — 97116 GAIT TRAINING THERAPY: CPT | Mod: GP | Performed by: PHYSICAL THERAPY ASSISTANT

## 2020-01-10 PROCEDURE — 25000132 ZZH RX MED GY IP 250 OP 250 PS 637: Performed by: ORTHOPAEDIC SURGERY

## 2020-01-10 PROCEDURE — 99232 SBSQ HOSP IP/OBS MODERATE 35: CPT | Performed by: PHYSICIAN ASSISTANT

## 2020-01-10 PROCEDURE — 97530 THERAPEUTIC ACTIVITIES: CPT | Mod: GP | Performed by: PHYSICAL THERAPY ASSISTANT

## 2020-01-10 RX ORDER — OXYCODONE HYDROCHLORIDE 5 MG/1
5 TABLET ORAL EVERY 4 HOURS PRN
Qty: 30 TABLET | Refills: 0 | Status: SHIPPED | OUTPATIENT
Start: 2020-01-10 | End: 2020-01-13

## 2020-01-10 RX ADMIN — MELATONIN 5000 UNITS: at 08:23

## 2020-01-10 RX ADMIN — ACETAMINOPHEN 975 MG: 325 TABLET, FILM COATED ORAL at 01:58

## 2020-01-10 RX ADMIN — FLUTICASONE PROPIONATE 2 SPRAY: 50 SPRAY, METERED NASAL at 08:23

## 2020-01-10 RX ADMIN — MONTELUKAST 10 MG: 10 TABLET, FILM COATED ORAL at 08:23

## 2020-01-10 RX ADMIN — FLUTICASONE FUROATE AND VILANTEROL TRIFENATATE 1 PUFF: 100; 25 POWDER RESPIRATORY (INHALATION) at 08:24

## 2020-01-10 RX ADMIN — OXYCODONE HYDROCHLORIDE 5 MG: 5 TABLET ORAL at 06:14

## 2020-01-10 RX ADMIN — FOLIC ACID TAB 400 MCG 400 MCG: 400 TAB at 08:23

## 2020-01-10 RX ADMIN — OMEPRAZOLE 20 MG: 20 CAPSULE, DELAYED RELEASE ORAL at 06:14

## 2020-01-10 RX ADMIN — LORATADINE 10 MG: 10 TABLET ORAL at 08:23

## 2020-01-10 RX ADMIN — ACETAMINOPHEN 975 MG: 325 TABLET, FILM COATED ORAL at 10:50

## 2020-01-10 RX ADMIN — Medication 1 CAPSULE: at 08:23

## 2020-01-10 RX ADMIN — SENNOSIDES AND DOCUSATE SODIUM 1 TABLET: 8.6; 5 TABLET ORAL at 08:23

## 2020-01-10 RX ADMIN — OXYCODONE HYDROCHLORIDE 5 MG: 5 TABLET ORAL at 12:15

## 2020-01-10 RX ADMIN — GUAIFENESIN AND DEXTROMETHORPHAN HYDROBROMIDE 1 TABLET: 600; 30 TABLET, EXTENDED RELEASE ORAL at 08:23

## 2020-01-10 ASSESSMENT — ACTIVITIES OF DAILY LIVING (ADL)
ADLS_ACUITY_SCORE: 16

## 2020-01-10 NOTE — PLAN OF CARE
POD 3 L3-4 fusion. A&O. VSS. CMS intact ex baseline numbness to BLE and Last 2 fingers of RUE. Pain reduced with scheduled tylenol. Dressing CDI. Voiding per BR. No BM yet. ABD rounded. C/o gas pain. Attempted BM, with result. Tube placed with some return of gas, improvement in sx. Plan for discharge to TCU, Discharge pending.

## 2020-01-10 NOTE — PROGRESS NOTES
MULUGETA    I: SW received VM from [facility] admissions asking for update on possible discharge today. RN CC texted PA-C. Bedside RN reports patient's 02 has been increased to 5 liters from overnight.  Awaiting update from physician.     P: Continue to assist as needed.    ELLIE Bell   [facility]  [phone]    UPDATE@1011: Per physician, pt may still discharge today; Will re-review after PT.     UPDATE@1118: Per physician SW set up a tentative transport time to [facility] with their transport aide for: 3:30. Orders needed by 2:30 at the latest. Pt may require portable 02 for transport. Pt updated patient.     UPDATE@1430: Orders, script and PAS were faxed to facility. No further SW interventions anticipated at this time. [facility] confirms transport aide will bring portable 02 tank.   PAS-RR    D: Per DHS regulation, SW completed and submitted PAS-RR to [place] [employer] via the Senior [employer] Line.  PAS-RR confirmation # is : [id number]    I: SW spoke with patient and they are aware a PAS-RR has been submitted.  SW reviewed with patient that they may be contacted for a follow up appointment within 10 days of hospital discharge if their SNF stay is < 30 days.  Contact information for [employer] was also provided.    A: patient verbalized understanding.    P: Further questions may be directed to [employer] Line at #[phone], option #4 for PAS-RR staff.

## 2020-01-10 NOTE — PLAN OF CARE
[id number]: POD2 from a L3-4 fusion with Dr. Collier. A&Ox4. CMS intact, ex baseline numbness in BLE. VSS, ex needing 3 L O2 with CPAP when sleeping. Dressing CDI. Up with assist of 1, walker, and GB. Assistance needed to shift weight. On a regular diet with thin liquids. Takes pills whole with water. Rating back pain 2/10, decreased with scheduled tylenol. Voiding adequately. Pt scoring green on aggression stop light tool. Plan: discharge to TCU, date pending.

## 2020-01-10 NOTE — PLAN OF CARE
Occupational Therapy Discharge Summary    Reason for therapy discharge:    Discharged to transitional care facility.    Progress towards therapy goal(s). See goals on Care Plan in [facility] electronic health record for goal details.  Goals not met.  Barriers to achieving goals:   discharge from facility.    Therapy recommendation(s):    Continued therapy is recommended.  Rationale/Recommendations:  to improve ind w/ ADLs.

## 2020-01-10 NOTE — PLAN OF CARE
A&OX4, POD 3. VSS on 2L NC on and off. Chest X-ray completed without infiltrates. Pain in th lower back managed with PRN Oxy and scheduled Tylenol. Up AX1 with GB and walker. Dressing on the back c/d/I except for some tape burns. Passing gas and 1 small BM this shift. PIV removed. All discharge instructions reviewed with pt. Pt verbalized understanding. All belongings including home meds returned to pt.Plan to discharge to [facility] at 1530 PM. Continue to monitor.

## 2020-01-10 NOTE — PLAN OF CARE
Patient alert x 4, right hand last 2 fingers numb at baseline, bilateral LE numbness baseline but improving, patient continues to have abdominal  pain, rectal tube placed to relieve gas then removed and patient also given supp, passing gas and good bowel sounds. Encouraged patient mobility and was up in martinez with 1 assist GB/W and sat in chair for meals. Patient plan for TCU at discharge.    Order just received to pull BITA and change dressing

## 2020-01-10 NOTE — DISCHARGE SUMMARY
Admit Date:     01/07/2020   Discharge Date:           DIAGNOSIS:  Degenerative spondylolisthesis, spinal stenosis and neurogenic claudication.      PROCEDURE THIS ADMISSION:  L3, L4 and L5 laminectomies, posterior spinal fusion, posterior lumbar interbody fusion at L3 to L4, L4 to L5 and L5 to S1 using intervertebral prosthesis, local autograft and instrumentation.      HISTORY OF PRESENT ILLNESS:  Ms. Templeton is a 69-year-old woman who has a long history of low back and bilateral leg pain with numbness in her legs.  Her symptoms were present with standing and walking and relieved when she sat or lied down.  Her preoperative evaluation revealed degenerative spondylolisthesis at L3 to L4, L4 to L5 and L5 to S1 with severe spinal stenosis at L3 to L4 and L4 to L5 and lateral recess stenosis at L5 to S1.  Because her symptoms were refractory to conservative treatment, she elected for operative treatment.      HOSPITAL COURSE:  On the day of admission, she was taken to the operating room where she underwent the previously mentioned procedure.  She tolerated the procedure without difficulty and was returned to her hospital walter postoperatively.  Her postoperative course was remarkable for difficulty maintaining oxygen saturation preoperatively.  She has a diagnosis of asthma and preoperatively had a course of steroids and antibiotics to tune her lungs up for the procedure.  Her breathing seems to be improving throughout the hospitalization, but she still required intermittent oxygen to maintain her saturations at times.  She made slow progress in physical therapy.  She did feel that her legs were stronger than they were preoperatively, but she was generally fatigued.  It was felt that she would benefit from a short-term rehab stay.  She was discharged to the rehab center on postoperative day #3 with physical and occupational therapy.      DISCHARGE MEDICATIONS:  Include oxycodone 5 mg 1 tablet p.o. every 6 hours for pain.   She was given 3 tablets.  Vitamin D3 5000 international units p.o. each day.  Her vitamin D level was checked on admission and found to be 49.  She will take this for a period of 3 months postoperatively.  She was instructed to resume her preoperative medications including Voltaren topical gel apply to skin twice a day as needed, glucosamine chondroitin sulfate 1 tablet p.o. every evening, Omega-3 fatty acids 1200 mg p.o. each day, Zipfizz multivitamin 1 p.o. each day, Sudafed 12-hour 120 mg p.o. at bedtime, Dyazide 37.5/25 one p.o. each morning, Folvite 400 mcg p.o. each day, Claritin 10 mg p.o. each day, magnesium gluconate 350 mg p.o. every evening, Biofreeze twice a day as directed, Singulair 1 tablet p.o. each day.      DISCHARGE INSTRUCTIONS:  She will follow up in my clinic 6 weeks from the time of discharge.  Avoid bending, lifting and twisting until followup.         DEJUAN MIX MD             D: 01/10/2020   T: 01/10/2020   MT:       Name:     LATONIA MARSH   MRN:      [id number]        Account:        [id number]   :      1950           Admit Date:     2020                                  Discharge Date:       Document: [id number]       cc: Dejuan Grigsby MD

## 2020-01-10 NOTE — PROGRESS NOTES
[facility]    Medicine Progress Note - Hospitalist Service       Date of Admission:  1/7/2020  Assessment & Plan   Melissa Templeton is a 69-year-old female with a past medical history significant for obstructive sleep apnea, hyperlipidemia, obesity, osteoarthritis, asthma and prediabetes who is status post L3, L4, L5 laminectomy and posterior spinal fusion of L3 through S1.  The Hospitalist Service was consulted for postoperative medical co-management.     Status post L3, L4, and L5 laminectomy with posterior spinal fusion of L3 through S1  -  POD 3  - Routine post-op cares and pain control per Ortho     Mild persistent asthma  Intermittent Hypoxia  The patient reports recent exacerbation treated with steroid taper and a Z-Nehemiah. Currently breathing feels well. No wheezing  - Intermittantly requiring O2. Mostly when she falls asleep. Says she doesn't even nap without CPAP. Will check XRAY given recent exacerbation. If clear can d/c with prn O2 at TCU  - Continue PTA inhalers  - Encourage IS and ambulation    Obstructive sleep apnea:    -Continue CPAP per home settings.     Hypertension: [PTA regimen includes triamterene/hydrochlorothiazide 37.5-35 mg/d]  - Hold for now  - Resume on discharge    Hypokalemia, resolved:   - Resolved with repalcement     Diet: Advance Diet as Tolerated: Regular Diet Adult    DVT Prophylaxis: Pneumatic Compression Devices  Concepcion Catheter: not present  Code Status: Full Code      Disposition Plan   Expected discharge:  Per primary service. Today vs tomorrow. TCU vs home. Medically cleared  Entered: Jaclyn Negrete PA-C 01/10/2020, 9:16 AM       The patient's care was discussed with the Patient.    Jaclyn Negrete PA-C  Hospitalist Service  [facility]    ______________________________________________________________________    Interval History   Feeling better today. Doing well. Tolerating diet. Pain control.     Data reviewed today: I reviewed all  medications, new labs and imaging results over the last 24 hours. I personally reviewed no images or EKG's today.    Physical Exam   Vital Signs: Temp: 98.4  F (36.9  C) Temp src: Oral BP: 138/69   Heart Rate: 102 Resp: 18 SpO2: 92 % O2 Device: Nasal cannula Oxygen Delivery: 5 LPM  Weight: 180 lbs 0 oz  Constitutional: Alert, resting comfortably in NAD  HEENT: Head normocephalic, atraumatic. Eyes sclera non icteric. Oropharynx clear and most  Respiratory: Normal effort, symmetric expansion, no crackles or wheezing  Cardiovascular: RRR no murmurs   GI: Non distended, normal bowels sounds, no tenderness or guarding  MSK: LE without edema. Dorsalis pedis pulse palpated bilaterally.   Skin/Integumen: Clear  Neuro: CN II-XII grossly intact  Psych:  Alert and oriented x 3. Normal affect      Data   Recent Labs   Lab 01/09/20  0808 01/08/20  0446 01/07/20  0627   HGB 11.9 12.4  --    NA  --  137  --    POTASSIUM  --  4.0 3.3*   CHLORIDE  --  106  --    CO2  --  25  --    BUN  --  9  --    CR  --  0.52  --    ANIONGAP  --  6  --    MATTI  --  7.7*  --    GLC  --  157*  --      No results found for this or any previous visit (from the past 24 hour(s)).

## 2020-01-10 NOTE — PLAN OF CARE
Discharge Planner PT   Patient plan for discharge: TCU  Current status:  Pt needed to use the bathroom upon arrival.  Having difficulty with bed mobility due to pain and needed increased time to transfer supine to sit using logroll technique and mod assist.  Sit to/from stand transfer with min assist of 2 for safety.  Gait training 10 ft x 2 using wheeled walker and min assist of 2.  Pt is unsteady with gait.  Cues needed for safety and to extend knees when weight-bearing as LE's appear to be weak and there is concern for buckling.  Cues needed for safe sitting procedure.  Barriers to return to prior living situation: current level of assist  Recommendations for discharge: TCU per plan established by the PT.   Rationale for recommendations: Pt is progressing slowly.  Pt would benefit continued skilled therapies in TCU to progress with independence and safety with mobility prior to returning home. If pt were to return home, would need Ax1 for bed, mobility, transfers & short distance gait and home PT.        Entered by: Anahi Coulter 01/10/2020 10:18 AM         PM PT deferred as pt is discharging to TCU soon.  PT goals not met.

## 2020-01-13 ENCOUNTER — NURSING HOME VISIT (OUTPATIENT)
Dept: GERIATRICS | Facility: CLINIC | Age: 70
End: 2020-01-13
Payer: COMMERCIAL

## 2020-01-13 VITALS
SYSTOLIC BLOOD PRESSURE: 120 MMHG | HEIGHT: 59 IN | OXYGEN SATURATION: 93 % | HEART RATE: 85 BPM | RESPIRATION RATE: 16 BRPM | BODY MASS INDEX: 38.87 KG/M2 | TEMPERATURE: 98.1 F | WEIGHT: 192.8 LBS | DIASTOLIC BLOOD PRESSURE: 74 MMHG

## 2020-01-13 DIAGNOSIS — K59.09 CHRONIC CONSTIPATION: ICD-10-CM

## 2020-01-13 DIAGNOSIS — J45.40 MODERATE PERSISTENT ASTHMA WITHOUT COMPLICATION: ICD-10-CM

## 2020-01-13 DIAGNOSIS — Z98.890 S/P LUMBAR LAMINECTOMY: ICD-10-CM

## 2020-01-13 DIAGNOSIS — I10 BENIGN ESSENTIAL HYPERTENSION: ICD-10-CM

## 2020-01-13 DIAGNOSIS — E66.01 MORBID OBESITY (H): ICD-10-CM

## 2020-01-13 DIAGNOSIS — M48.062 SPINAL STENOSIS OF LUMBAR REGION WITH NEUROGENIC CLAUDICATION: Primary | ICD-10-CM

## 2020-01-13 DIAGNOSIS — M15.0 PRIMARY OSTEOARTHRITIS INVOLVING MULTIPLE JOINTS: ICD-10-CM

## 2020-01-13 DIAGNOSIS — M48.062 SPINAL STENOSIS OF LUMBAR REGION WITH NEUROGENIC CLAUDICATION: ICD-10-CM

## 2020-01-13 DIAGNOSIS — E87.71 TRANSFUSION ASSOCIATED CIRCULATORY OVERLOAD: ICD-10-CM

## 2020-01-13 PROCEDURE — 99309 SBSQ NF CARE MODERATE MDM 30: CPT | Performed by: NURSE PRACTITIONER

## 2020-01-13 RX ORDER — BISACODYL 5 MG/1
10 TABLET, DELAYED RELEASE ORAL DAILY
Start: 2020-01-13 | End: 2021-02-08

## 2020-01-13 RX ORDER — OXYCODONE HYDROCHLORIDE 5 MG/1
5 TABLET ORAL EVERY 4 HOURS PRN
Qty: 30 TABLET | Refills: 0 | Status: SHIPPED | OUTPATIENT
Start: 2020-01-13 | End: 2020-01-16

## 2020-01-13 RX ORDER — FUROSEMIDE 20 MG
20 TABLET ORAL DAILY
Start: 2020-01-13 | End: 2020-01-16

## 2020-01-13 ASSESSMENT — MIFFLIN-ST. JEOR: SCORE: 1305.17

## 2020-01-13 NOTE — LETTER
1/13/2020        RE: Melissa Templeton  [address]  [facility] [address]        [facility]  PRIMARY CARE PROVIDER AND CLINIC:  Dimple E Loeding, [facility] [place] [address] / [place] [address]  Chief Complaint   Patient presents with     Hospital F/U     [place] [facility] Record Number:  [id number]  Place of Service where encounter took place:  [facility] [[id number]]    Melissa Templeton  is a 69 year old  (1950), admitted to the above facility from  [facility]. Hospital stay 01/07/20 through 01/10/20..  Admitted to this facility for  rehab, medical management and nursing care.    HPI:    HPI information obtained from: facility chart records, facility staff, patient report and [facility] chart review.   Brief Summary of Hospital Course: recent hospitalization due to planned back surgery due to severe spinal stenosis, degenerative spondylolisthesis and mild osteopenia. .   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 Just prior to surgery she was treated with prednisone and Z pack due to asthma exacerbation. Pre op hgb 15.     Patient underwent  L3, L4,  & L5 laminectomies, posterior spinal fusion and posterior lumbar interbody fusion L3-L4, L4-L5, and L5-S1 on 1/7/20.     Post operative issues included ongoing wheezing and hypoxia requiring supplemental oxygen. She was started on omeprazole for heart burn.     Updates on Status Since Skilled nursing Admission: patient reports no bowel movement since last Monday. She did have one suppository in hospital and said she had small results. She has some back pain and ongoing bilateral LE numbness and tingling to knees. She reports her weight is up by 9 #.     CODE STATUS/ADVANCE DIRECTIVES DISCUSSION:    CPR/Full code   Patient's living condition: lives with spouse in [facility].   ALLERGIES: Magnesium citrate; Erythromycin; Hmg-coa-r inhibitors; and Nickel  PAST MEDICAL HISTORY:  has a past medical history of Allergic rhinitis, cause unspecified, Fluid overload, Hyperlipidemia, Hypoglycemia, unspecified, Obese, Osteoarthrosis, unspecified whether generalized or localized, unspecified site, Sleep apnea, Uncomplicated asthma, Unspecified cataract, and Vitamin D deficiency. She also has no past medical history of Complication of anesthesia.  PAST SURGICAL HISTORY:   has a past surgical history that includes NONSPECIFIC PROCEDURE; NONSPECIFIC PROCEDURE (4-98); NONSPECIFIC PROCEDURE (1990); appendectomy; Eye surgery; back surgery; colonoscopy; GYN surgery; orthopedic surgery; Arthroplasty knee (Right, 1/23/2018); Arthroplasty shoulder (Right, 6/13/2019); orthopedic surgery; Cholecystectomy; and Laminectomy, fusion lumbar three+ level, combined (N/A, 1/7/2020).  FAMILY HISTORY: family history includes Allergies in her sister; Arthritis in her mother; C.A.D. in her sister; Cerebrovascular Disease in her father; Diabetes in her mother; Eye Disorder in her mother.  SOCIAL HISTORY:   reports that she quit smoking about 29 years ago. She quit after 10.00 years of use. She has never used smokeless tobacco. She reports current alcohol use. She reports that she does not use drugs.    Post Discharge Medication Reconciliation Status: discharge medications reconciled, continue medications without change    Current Outpatient Medications   Medication Sig Dispense Refill     acetaminophen (TYLENOL) 500 MG tablet Take 1,000 mg by mouth 3 times daily       albuterol (PROAIR HFA/PROVENTIL HFA/VENTOLIN HFA) 108 (90 BASE) MCG/ACT Inhaler Inhale 2 puffs into the lungs 2 times daily as needed for shortness of breath / dyspnea or wheezing        cephALEXin (KEFLEX) 500 MG capsule Take 1,000 mg by mouth 2 times daily as needed (before and after  dental procedure)       cholecalciferol (VITAMIN D3) 125 MCG (5000 UT) TABS tablet Take 5,000 Units by mouth daily       dextromethorphan-guaiFENesin (MUCINEX DM)  MG 12 hr tablet Take 1 tablet by mouth every morning       diclofenac (VOLTAREN) 1 % GEL topical gel Place onto the skin 2 times daily as needed for moderate pain        FIBER ADULT GUMMIES PO Take 2 chew tab by mouth every morning (FIBERWELL)        fluticasone-vilanterol (BREO ELLIPTA) 100-25 MCG/INH inhaler Inhale 1 puff into the lungs daily        folic acid (FOLVITE) 400 MCG tablet Take 400 mcg by mouth daily       furosemide (LASIX) 20 MG tablet Take 1 tablet (20 mg) by mouth daily       lidocaine (LIDODERM) 5 % Patch Place 1 patch onto the skin daily as needed for moderate pain       loratadine (CLARITIN) 10 MG tablet Take 10 mg by mouth daily        magnesium gluconate (MAGONATE) 250 MG tablet Take 250 mg by mouth every evening       Menthol, Topical Analgesic, (BIOFREEZE ROLL-ON) 4 % GEL Apply topically 2 times daily as needed (pain)       Misc Natural Products (GLUCOS-CHONDROIT-MSM COMPLEX PO) Take 1 tablet by mouth every evening       mometasone (NASONEX) 50 MCG/ACT nasal spray Spray 2 sprays into both nostrils daily       Montelukast Sodium (SINGULAIR PO) Take 10 mg by mouth every morning        Multiple Vitamins-Minerals (PRESERVISION AREDS 2 PO) Take 1 capsule by mouth 2 times daily       Omega-3 Fatty Acids (FISH OIL PO) Take 1,200 mg by mouth daily       order for DME Equipment being ordered: Walker Wheels () and Walker ()  Treatment Diagnosis: difficulty with gait 1 each 0     ORDER FOR DME Equipment being ordered: rt short cam walker 1 each 0     oxyCODONE (ROXICODONE) 5 MG tablet Take 1 tablet (5 mg) by mouth every 4 hours as needed for moderate to severe pain 30 tablet 0     Prenatal Vit-Fe Fumarate-FA (PRENATAL 19) 29-1 MG CHEW Take 1 chew tab by mouth every evening        pseudoePHEDrine (SUDOGEST 12 HOUR) 120 MG 12  "hr tablet Take 120 mg by mouth At Bedtime        senna-docusate (SENOKOT-S;PERICOLACE) 8.6-50 MG per tablet Take 1-2 tablets by mouth 2 times daily (Patient taking differently: Take 2 tablets by mouth At Bedtime ) 40 tablet 0     triamterene-HCTZ (DYAZIDE) 37.5-25 MG capsule Take 1 capsule by mouth every morning       OVER-THE-COUNTER Take 1 Container by mouth every morning Mixed with orange juice. Vitamin supplement called Zipfizz.         ROS:  4 point ROS including Respiratory, CV, GI and , other than that noted in the HPI,  is negative    Vitals:  /74   Pulse 85   Temp 98.1  F (36.7  C)   Resp 16   Ht 1.499 m (4' 11\")   Wt 87.5 kg (192 lb 12.8 oz)   SpO2 93%   BMI 38.94 kg/m     Exam:  GENERAL APPEARANCE:  Alert, in no distress  ENT:  Mouth and posterior oropharynx normal, moist mucous membranes, hearing acuity adequate   EYES:  EOM, conjunctivae, lids, pupils and irises normal    RESP:  respiratory effort and palpation of chest normal, no respiratory distress, Lung sounds faint exp wheeze in bases  CV:  Palpation and auscultation of heart done , rate and rhythm reg, no murmur, no rub or gallop, Edema 1+  ABDOMEN:  normal bowel sounds, soft, nontender, no hepatosplenomegaly or other masses  M/S:   Gait and station not observed. numbness in LEs to knees, Digits and nails nl  SKIN:  Inspection/Palpation of skin and subcutaneous tissue - steristrips on spinal incision. Area is CDI, no erythema   NEURO: 2-12 in normal limits and at patient's baseline  PSYCH:  insight and judgement, memory intact , affect and mood normal    Lab/Diagnostic data:  CBC RESULTS:   Recent Labs   Lab Test 01/09/20  0808 01/08/20  0446   HGB 11.9 12.4       Last Basic Metabolic Panel:  Recent Labs   Lab Test 01/08/20  0446 01/07/20  0627 06/14/19  0607     --   --    POTASSIUM 4.0 3.3*  --    CHLORIDE 106  --   --    MATTI 7.7*  --   --    CO2 25  --   --    BUN 9  --   --    CR 0.52  --  0.71   *  --  138* "             ASSESSMENT/PLAN:  (M48.062) Spinal stenosis of lumbar region with neurogenic claudication  (primary encounter diagnosis)  (Z98.890) S/P lumbar laminectomy  Comment: recent hospitalization due to planned back surgery due to spinal stenosis, degenerative spondylolisthesis and mild osteopenia. There were no reports surgical complications. She is taking some oxycodone- up to 4 times a day. She is taking acetaminophen. She is working with therapy. She reports on ongoing LE numbness to knees.   Plan: continue physical therapy and OCCUPATIONAL THERAPY- no bending or twisting.    Schedule acetaminophen 1gm TID  Minimize oxycodone. Will reduce to q 6 hours prn.  later this week.   CBC in am  Follow up with ortho as planned.     (E87.71) Transfusion associated circulatory overload  Comment: patient report wt was up 9# upon transfer to TCU. She denies shortness of breath. She does report increased LE swelling.   Plan: furosemide (LASIX) 20 MG tablet for 5 days        BMP in am  Monitor wts  TG shapes to LEs  Continue PTA triamterene hydrochlorothiazide 37.5mg-25mg q day    (J45.40) Moderate persistent asthma without complication  Comment: prior to surgery patient was treated with z pack and prednisone. Post surgery she did have trouble keeping O2 sats >90%. Since in TCU she has used some oxygen but upon exam she is on room air. She denies shortness of breath.   Plan: continue PTA singular 10mg q day. claritin 10mg q day, pseudoephedrine 120mg q hs, fluticasone-vilaterterol 100-25 mcg/inh q day. mucinex DM q day and prn albuterol.     (M15.0) Primary osteoarthritis involving multiple joints  Comment: h/o shoulder surgery, R TKA. 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 Long h/o constipation. She does not like miralax. She  cannot take mag citrate.she does take dulolax tabs at home with senna and some OTC fiber pills.   Plan: dulcolax 10mg tabl q am  Change senna s ii tabs q pm  Push fluids  Prune juice-discussed with patient.      (I10) Benign essential hypertension  Comment: last few BPS in /74, 128/73, 124/71  Plan: no change    (E66.01) Morbid obesity (H)  Comment: Body mass index is 38.94 kg/m . due to excess calories  Plan: physical therapy     .     Electronically signed by:  INDIA Castro CNP                         Sincerely,        INDIA Castro CNP

## 2020-01-13 NOTE — PROGRESS NOTES
[facility]  INITIAL VISIT NOTE  January 14, 2020    PRIMARY CARE PROVIDER AND CLINIC:  Dimple Grigsby [address] / [place] [address]    CHIEF COMPLAINT:  Hospital follow-up/Initial visit    HPI:    Melissa Templeton is a 69 year old  (1950) [facility]. Medical history is notable for morbid obesity, obstructive sleep apnea, dyslipidemia, mild persistent asthma, osteoarthritis, hypertension, spinal stenosis, and prediabetes.    She was hospitalized at [facility] from January 7 through January 10, 2020 for elective lumbar spine surgery for spinal  stenosis.  She underwent L3, L4, L5 laminectomies and L3 through S1 posterior spinal fusion using intervertebral prosthesis and local autograft. Surgery was performed by Dr. Regan Collier on January 7 under general endotracheal anesthesia and blood loss was estimated at 600 mL. Hemoglobin dropped from PTA 15 to 11.9 post-operatively.  Notably, prior to surgery she was treated with prednisone and Z-Nehemiah due to asthma exacerbation.  Following the surgery, she was started on omeprazole for heartburn.    Patient is admitted to this facility for medical management, nursing care, and rehab.     Patient was seen today in her room, while lying in bed.  She complains of numbness in both legs.  Her surgical back pain is controlled.  She reports no fever, chills, chest pain, dyspnea, wheezing, palpitation, nausea, vomiting, abdominal pain, or urinary symptoms.  She states that after taking prune juice she had 3 bowel movements yesterday and 1 bowel movement today.  She stated that she will no longer use prune juice as it has given her abdominal cramps.  She reports weight gain of 15 pounds and leg edema since surgery.    CODE STATUS:   CPR/Full code     PAST MEDICAL HISTORY:   Morbid obesity, BMI 38.94  Obstructive sleep apnea, on CPAP    Dyslipidemia  Mild persistent asthma  Osteoarthritis  Hypertension  Prediabetes  GERD  Vitamin D deficiency  Allergic rhinitis  Cholelithiasis  Chronic constipation    PAST SURGICAL HISTORY:   Past Surgical History:   Procedure Laterality Date     APPENDECTOMY       ARTHROPLASTY KNEE Right 2018    Procedure: ARTHROPLASTY KNEE;  RIGHT TOTAL KNEE ARTHROPLASTY    EBL: 5mL;  Surgeon: Ambar Wilson MD;  Location: [facility] OR     ARTHROPLASTY SHOULDER Right 2019    Procedure: RIGHT TOTAL SHOULDER ARTHROPLASTY REVERSE (TORNIER)^;  Surgeon: Ambar Wilson MD;  Location:  OR     BACK SURGERY       C NONSPECIFIC PROCEDURE      Hysterectomy fibroids - bilat S&O     C NONSPECIFIC PROCEDURE      Discectomy L5-S1     C NONSPECIFIC PROCEDURE      Cosmetic below eyes     CHOLECYSTECTOMY       COLONOSCOPY       EYE SURGERY       GYN SURGERY      MARYJO BSO     LAMINECTOMY, FUSION LUMBAR THREE+ LEVEL, COMBINED N/A 2020    Procedure: L3,L4,L5 LAMINECTOMIES; POSTERIOR SPINAL FUSION; POSTERIOR LUMBAR INTERBODY FUSION L3-S1 USING INTERVERTEBRAL PROSTHESIS; LOCAL AUTOGRAFT AND INSTRUMENTATION.;  Surgeon: Regan Collier MD;  Location:  OR     ORTHOPEDIC SURGERY      dulce. rotator cuffs     ORTHOPEDIC SURGERY      lumbar laminectomy       FAMILY HISTORY:   Family History   Problem Relation Age of Onset     Diabetes Mother      Arthritis Mother      Eye Disorder Mother         MACULAR DEGENERATION     Cerebrovascular Disease Father      C.A.D. Sister      Allergies Sister        SOCIAL HISTORY:  Patient is  and lives in a house.  Since surgery, she has use a cane for ambulation.  She is a former smoker.    Social History     Tobacco Use     Smoking status: Former Smoker     Years: 10.00     Last attempt to quit:      Years since quittin.0     Smokeless tobacco: Never Used   Substance Use Topics     Alcohol use: Yes     Comment: OCCASIONALLY--2/month       MEDICATIONS:  Current Outpatient  Medications   Medication Sig Dispense Refill     acetaminophen (TYLENOL) 500 MG tablet Take 1,000 mg by mouth 3 times daily       albuterol (PROAIR HFA/PROVENTIL HFA/VENTOLIN HFA) 108 (90 BASE) MCG/ACT Inhaler Inhale 2 puffs into the lungs 2 times daily as needed for shortness of breath / dyspnea or wheezing        bisacodyl (DULCOLAX) 5 MG EC tablet Take 2 tablets (10 mg) by mouth daily       cephALEXin (KEFLEX) 500 MG capsule Take 1,000 mg by mouth 2 times daily as needed (before and after dental procedure)       cholecalciferol (VITAMIN D3) 125 MCG (5000 UT) TABS tablet Take 5,000 Units by mouth daily       dextromethorphan-guaiFENesin (MUCINEX DM)  MG 12 hr tablet Take 1 tablet by mouth every morning       diclofenac (VOLTAREN) 1 % GEL topical gel Place onto the skin 2 times daily as needed for moderate pain        FIBER ADULT GUMMIES PO Take 2 chew tab by mouth every morning (HEATHER)        fluticasone-vilanterol (BREO ELLIPTA) 100-25 MCG/INH inhaler Inhale 1 puff into the lungs daily        folic acid (FOLVITE) 400 MCG tablet Take 400 mcg by mouth daily       furosemide (LASIX) 20 MG tablet Take 1 tablet (20 mg) by mouth daily       lidocaine (LIDODERM) 5 % Patch Place 1 patch onto the skin daily as needed for moderate pain       loratadine (CLARITIN) 10 MG tablet Take 10 mg by mouth daily        magnesium gluconate (MAGONATE) 250 MG tablet Take 250 mg by mouth every evening       Menthol, Topical Analgesic, (BIOFREEZE ROLL-ON) 4 % GEL Apply topically 2 times daily as needed (pain)       Misc Natural Products (GLUCOS-CHONDROIT-MSM COMPLEX PO) Take 1 tablet by mouth every evening       mometasone (NASONEX) 50 MCG/ACT nasal spray Spray 2 sprays into both nostrils daily       Montelukast Sodium (SINGULAIR PO) Take 10 mg by mouth every morning        Multiple Vitamins-Minerals (PRESERVISION AREDS 2 PO) Take 1 capsule by mouth 2 times daily       Omega-3 Fatty Acids (FISH OIL PO) Take 1,200 mg by mouth  daily       order for DME Equipment being ordered: Walker Wheels () and Walker ()  Treatment Diagnosis: difficulty with gait 1 each 0     ORDER FOR DME Equipment being ordered: rt short cam walker 1 each 0     OVER-THE-COUNTER Take 1 Container by mouth every morning Mixed with orange juice. Vitamin supplement called Zipfizz.       oxyCODONE (ROXICODONE) 5 MG tablet Take 1 tablet (5 mg) by mouth every 4 hours as needed for moderate to severe pain 30 tablet 0     Prenatal Vit-Fe Fumarate-FA (PRENATAL 19) 29-1 MG CHEW Take 1 chew tab by mouth every evening        pseudoePHEDrine (SUDOGEST 12 HOUR) 120 MG 12 hr tablet Take 120 mg by mouth At Bedtime        senna-docusate (SENOKOT-S;PERICOLACE) 8.6-50 MG per tablet Take 1-2 tablets by mouth 2 times daily (Patient taking differently: Take 2 tablets by mouth At Bedtime ) 40 tablet 0     triamterene-HCTZ (DYAZIDE) 37.5-25 MG capsule Take 1 capsule by mouth every morning         ALLERGIES:  Allergies   Allergen Reactions     Magnesium Citrate Shortness Of Breath and Other (See Comments)     And CONFUSION     Erythromycin      nausea     Hmg-Coa-R Inhibitors Muscle Pain (Myalgia)     Nickel Rash       ROS:  10 point ROS neg other than the symptoms noted above in the HPI.    PHYSICAL EXAM:  Vitals: Blood pressure 135/76, heart rate 86, respiratory rate 16, temperature 99.2  F, oxygen saturation 95%, weight 192.8 pounds  Gen: Cooperative and in no acute distress  HEENT: Normocephalic; oropharynx clear  Card: Normal S1, S2, RRR  Resp: Lungs clear to auscultation bilaterally  GI: Abdomen soft, non-tender, non-distended, +BS  Ext: Trace B/L LE edema  Neuro: CX II-XII grossly intact; ROM in all four extremities grossly in tact  Psych: Alert and oriented x3; normal affect  Skin: No acute rash    LABORATORY/IMAGING DATA:  Lab Results   Component Value Date    WBC 6.1 11/17/2008     Lab Results   Component Value Date    RBC 4.22 11/17/2008     Lab Results   Component Value  Date    HGB 11.9 01/09/2020     Lab Results   Component Value Date    HCT 38.7 11/17/2008     Lab Results   Component Value Date    MCV 92 11/17/2008     Lab Results   Component Value Date    MCH 31.1 11/17/2008     Lab Results   Component Value Date    MCHC 33.9 11/17/2008     Lab Results   Component Value Date    RDW 11.9 11/17/2008     Lab Results   Component Value Date     11/17/2008         Last Comprehensive Metabolic Panel:  Sodium   Date Value Ref Range Status   01/08/2020 137 133 - 144 mmol/L Final     Potassium   Date Value Ref Range Status   01/08/2020 4.0 3.4 - 5.3 mmol/L Final     Chloride   Date Value Ref Range Status   01/08/2020 106 94 - 109 mmol/L Final     Carbon Dioxide   Date Value Ref Range Status   01/08/2020 25 20 - 32 mmol/L Final     Anion Gap   Date Value Ref Range Status   01/08/2020 6 3 - 14 mmol/L Final     Glucose   Date Value Ref Range Status   01/08/2020 157 (H) 70 - 99 mg/dL Final     Urea Nitrogen   Date Value Ref Range Status   01/08/2020 9 7 - 30 mg/dL Final     Creatinine   Date Value Ref Range Status   01/08/2020 0.52 0.52 - 1.04 mg/dL Final     GFR Estimate   Date Value Ref Range Status   01/08/2020 >90 >60 mL/min/[1.73_m2] Final     Comment:     Non  GFR Calc  Starting 12/18/2018, serum creatinine based estimated GFR (eGFR) will be   calculated using the Chronic Kidney Disease Epidemiology Collaboration   (CKD-EPI) equation.       Calcium   Date Value Ref Range Status   01/08/2020 7.7 (L) 8.5 - 10.1 mg/dL Final         ASSESSMENT/PLAN:  Degenerative spondylolisthesis with spinal stenosis and neurogenic claudication,  Status post L3, L4, L5 laminectomies and L3 through S1 posterior spinal fusion on January 7, 2020,  Physical deconditioning.  Patient has ongoing bilateral lower extremity numbness to knees.  Plan:  Pain management with scheduled acetaminophen 1 g p.o. 3 times daily and PRN oxycodone  Minimize the use of narcotics for prevention of  confusion  Continue PT/OT evaluation and therapy  CBC on Jan 14  Follow-up with ortho as scheduled  Consider initiating gabapentin if paresthesia persists    Mild persistent asthma.  Prior to recent surgery, the patient had been treated with Z-Nehemiah and prednisone for acute asthma exacerbation.  Chest x-ray in the hospital on January 7 showed minimal probable atelectasis at the left base but no acute infiltrates.  Her respiratory status is stable today.  Plan:  Encourage frequent use of incentive spirometry  Continue PTA Singulair 10 mg p.o. daily, fluticasone-vilaterterol 100-25 mcg inhalation daily. mucinex DM daily, and prn albuterol  Monitor respiratory status    Morbid obesity, BMI 38.94,  Obstructive sleep apnea.  Plan:  Continue nocturnal CPAP at home setting    Hypertension,  Volume overload.  Blood pressure is currently controlled.  Started on furosemide 20 mg p.o. daily for volume overload with leg edema, completed on Jan 13.  Plan:  Continue PTA triamterene-hydrochlorothiazide, 37.5-35 mg p.o. daily  Monitor blood pressure, leg edema, weight  BMP on Jan 14    Chronic constipation.   She had a bowel movement yesterday and today after taking prune juice.  Plan:  Continue bowel regimen started on furosemide        Electronically signed by:  Jeff Bay MD

## 2020-01-13 NOTE — PROGRESS NOTES
[facility]  PRIMARY CARE PROVIDER AND CLINIC:  SARAH BETH [place] [address] [address] / [place] [address]  Chief Complaint   Patient presents with     Hospital F/U     [facility] Record Number:  [id number]  [facility] of Service where encounter took place:  [facility] [[id number]]    Melissa Templeton  is a 69 year old  (1950), admitted to the above facility from  [facility]. Hospital stay 01/07/20 through 01/10/20..  Admitted to this facility for  rehab, medical management and nursing care.    HPI:    HPI information obtained from: facility chart records, facility staff, patient report and [facility] chart review.   Brief Summary of Hospital Course: recent hospitalization due to planned back surgery due to severe spinal stenosis, degenerative spondylolisthesis and mild osteopenia. .   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 Just prior to surgery she was treated with prednisone and Z pack due to asthma exacerbation. Pre op hgb 15.     Patient underwent  L3, L4,  & L5 laminectomies, posterior spinal fusion and posterior lumbar interbody fusion L3-L4, L4-L5, and L5-S1 on 1/7/20.     Post operative issues included ongoing wheezing and hypoxia requiring supplemental oxygen. She was started on omeprazole for heart burn.     Updates on Status Since Skilled nursing Admission: patient reports no bowel movement since last Monday. She did have one suppository in hospital and said she had small results. She has some back pain and ongoing bilateral LE numbness and tingling to knees. She reports her weight is up by 9 #.     CODE STATUS/ADVANCE DIRECTIVES DISCUSSION:   CPR/Full code   Patient's living condition: lives with spouse in [facility].   ALLERGIES: Magnesium citrate;  Erythromycin; Hmg-coa-r inhibitors; and Nickel  PAST MEDICAL HISTORY:  has a past medical history of Allergic rhinitis, cause unspecified, Fluid overload, Hyperlipidemia, Hypoglycemia, unspecified, Obese, Osteoarthrosis, unspecified whether generalized or localized, unspecified site, Sleep apnea, Uncomplicated asthma, Unspecified cataract, and Vitamin D deficiency. She also has no past medical history of Complication of anesthesia.  PAST SURGICAL HISTORY:   has a past surgical history that includes NONSPECIFIC PROCEDURE; NONSPECIFIC PROCEDURE (4-98); NONSPECIFIC PROCEDURE (1990); appendectomy; Eye surgery; back surgery; colonoscopy; GYN surgery; orthopedic surgery; Arthroplasty knee (Right, 1/23/2018); Arthroplasty shoulder (Right, 6/13/2019); orthopedic surgery; Cholecystectomy; and Laminectomy, fusion lumbar three+ level, combined (N/A, 1/7/2020).  FAMILY HISTORY: family history includes Allergies in her sister; Arthritis in her mother; C.A.D. in her sister; Cerebrovascular Disease in her father; Diabetes in her mother; Eye Disorder in her mother.  SOCIAL HISTORY:   reports that she quit smoking about 29 years ago. She quit after 10.00 years of use. She has never used smokeless tobacco. She reports current alcohol use. She reports that she does not use drugs.    Post Discharge Medication Reconciliation Status: discharge medications reconciled, continue medications without change    Current Outpatient Medications   Medication Sig Dispense Refill     acetaminophen (TYLENOL) 500 MG tablet Take 1,000 mg by mouth 3 times daily       albuterol (PROAIR HFA/PROVENTIL HFA/VENTOLIN HFA) 108 (90 BASE) MCG/ACT Inhaler Inhale 2 puffs into the lungs 2 times daily as needed for shortness of breath / dyspnea or wheezing        cephALEXin (KEFLEX) 500 MG capsule Take 1,000 mg by mouth 2 times daily as needed (before and after dental procedure)       cholecalciferol (VITAMIN D3) 125 MCG (5000 UT) TABS tablet Take 5,000 Units by  mouth daily       dextromethorphan-guaiFENesin (MUCINEX DM)  MG 12 hr tablet Take 1 tablet by mouth every morning       diclofenac (VOLTAREN) 1 % GEL topical gel Place onto the skin 2 times daily as needed for moderate pain        FIBER ADULT GUMMIES PO Take 2 chew tab by mouth every morning (FIBERWELL)        fluticasone-vilanterol (BREO ELLIPTA) 100-25 MCG/INH inhaler Inhale 1 puff into the lungs daily        folic acid (FOLVITE) 400 MCG tablet Take 400 mcg by mouth daily       furosemide (LASIX) 20 MG tablet Take 1 tablet (20 mg) by mouth daily       lidocaine (LIDODERM) 5 % Patch Place 1 patch onto the skin daily as needed for moderate pain       loratadine (CLARITIN) 10 MG tablet Take 10 mg by mouth daily        magnesium gluconate (MAGONATE) 250 MG tablet Take 250 mg by mouth every evening       Menthol, Topical Analgesic, (BIOFREEZE ROLL-ON) 4 % GEL Apply topically 2 times daily as needed (pain)       Misc Natural Products (GLUCOS-CHONDROIT-MSM COMPLEX PO) Take 1 tablet by mouth every evening       mometasone (NASONEX) 50 MCG/ACT nasal spray Spray 2 sprays into both nostrils daily       Montelukast Sodium (SINGULAIR PO) Take 10 mg by mouth every morning        Multiple Vitamins-Minerals (PRESERVISION AREDS 2 PO) Take 1 capsule by mouth 2 times daily       Omega-3 Fatty Acids (FISH OIL PO) Take 1,200 mg by mouth daily       order for DME Equipment being ordered: Walker Wheels () and Walker ()  Treatment Diagnosis: difficulty with gait 1 each 0     ORDER FOR DME Equipment being ordered: rt short cam walker 1 each 0     oxyCODONE (ROXICODONE) 5 MG tablet Take 1 tablet (5 mg) by mouth every 4 hours as needed for moderate to severe pain 30 tablet 0     Prenatal Vit-Fe Fumarate-FA (PRENATAL 19) 29-1 MG CHEW Take 1 chew tab by mouth every evening        pseudoePHEDrine (SUDOGEST 12 HOUR) 120 MG 12 hr tablet Take 120 mg by mouth At Bedtime        senna-docusate (SENOKOT-S;PERICOLACE) 8.6-50 MG per  "tablet Take 1-2 tablets by mouth 2 times daily (Patient taking differently: Take 2 tablets by mouth At Bedtime ) 40 tablet 0     triamterene-HCTZ (DYAZIDE) 37.5-25 MG capsule Take 1 capsule by mouth every morning       OVER-THE-COUNTER Take 1 Container by mouth every morning Mixed with orange juice. Vitamin supplement called Zipfizz.         ROS:  4 point ROS including Respiratory, CV, GI and , other than that noted in the HPI,  is negative    Vitals:  /74   Pulse 85   Temp 98.1  F (36.7  C)   Resp 16   Ht 1.499 m (4' 11\")   Wt 87.5 kg (192 lb 12.8 oz)   SpO2 93%   BMI 38.94 kg/m    Exam:  GENERAL APPEARANCE:  Alert, in no distress  ENT:  Mouth and posterior oropharynx normal, moist mucous membranes, hearing acuity adequate   EYES:  EOM, conjunctivae, lids, pupils and irises normal    RESP:  respiratory effort and palpation of chest normal, no respiratory distress, Lung sounds faint exp wheeze in bases  CV:  Palpation and auscultation of heart done , rate and rhythm reg, no murmur, no rub or gallop, Edema 1+  ABDOMEN:  normal bowel sounds, soft, nontender, no hepatosplenomegaly or other masses  M/S:   Gait and station not observed. numbness in LEs to knees, Digits and nails nl  SKIN:  Inspection/Palpation of skin and subcutaneous tissue - steristrips on spinal incision. Area is CDI, no erythema   NEURO: 2-12 in normal limits and at patient's baseline  PSYCH:  insight and judgement, memory intact , affect and mood normal    Lab/Diagnostic data:  CBC RESULTS:   Recent Labs   Lab Test 01/09/20  0808 01/08/20  0446   HGB 11.9 12.4       Last Basic Metabolic Panel:  Recent Labs   Lab Test 01/08/20  0446 01/07/20  0627 06/14/19  0607     --   --    POTASSIUM 4.0 3.3*  --    CHLORIDE 106  --   --    MATTI 7.7*  --   --    CO2 25  --   --    BUN 9  --   --    CR 0.52  --  0.71   *  --  138*             ASSESSMENT/PLAN:  (M48.062) Spinal stenosis of lumbar region with neurogenic claudication  " (primary encounter diagnosis)  (Z98.890) S/P lumbar laminectomy  Comment: recent hospitalization due to planned back surgery due to spinal stenosis, degenerative spondylolisthesis and mild osteopenia. There were no reports surgical complications. She is taking some oxycodone- up to 4 times a day. She is taking acetaminophen. She is working with therapy. She reports on ongoing LE numbness to knees.   Plan: continue physical therapy and OCCUPATIONAL THERAPY- no bending or twisting.    Schedule acetaminophen 1gm TID  Minimize oxycodone. Will reduce to q 6 hours prn.  later this week.   CBC in am  Follow up with ortho as planned.     (E87.71) Transfusion associated circulatory overload  Comment: patient report wt was up 9# upon transfer to TCU. She denies shortness of breath. She does report increased LE swelling.   Plan: furosemide (LASIX) 20 MG tablet for 5 days        BMP in am  Monitor wts  TG shapes to LEs  Continue PTA triamterene hydrochlorothiazide 37.5mg-25mg q day    (J45.40) Moderate persistent asthma without complication  Comment: prior to surgery patient was treated with z pack and prednisone. Post surgery she did have trouble keeping O2 sats >90%. Since in TCU she has used some oxygen but upon exam she is on room air. She denies shortness of breath.   Plan: continue PTA singular 10mg q day. claritin 10mg q day, pseudoephedrine 120mg q hs, fluticasone-vilaterterol 100-25 mcg/inh q day. mucinex DM q day and prn albuterol.     (M15.0) Primary osteoarthritis involving multiple joints  Comment: h/o shoulder surgery, R TKA. 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 Long h/o constipation. She does not like miralax. She cannot take mag citrate.she does take dulolax tabs at home with senna and some OTC fiber pills.   Plan:  dulcolax 10mg tabl q am  Change senna s ii tabs q pm  Push fluids  Prune juice-discussed with patient.      (I10) Benign essential hypertension  Comment: last few BPS in /74, 128/73, 124/71  Plan: no change    (E66.01) Morbid obesity (H)  Comment: Body mass index is 38.94 kg/m . due to excess calories  Plan: physical therapy     .     Electronically signed by:  INDIA Castro CNP

## 2020-01-14 ENCOUNTER — NURSING HOME VISIT (OUTPATIENT)
Dept: GERIATRICS | Facility: CLINIC | Age: 70
End: 2020-01-14
Payer: COMMERCIAL

## 2020-01-14 ENCOUNTER — HOSPITAL LABORATORY (OUTPATIENT)
Dept: OTHER | Facility: CLINIC | Age: 70
End: 2020-01-14

## 2020-01-14 DIAGNOSIS — I10 BENIGN ESSENTIAL HYPERTENSION: ICD-10-CM

## 2020-01-14 DIAGNOSIS — M48.062 SPINAL STENOSIS OF LUMBAR REGION WITH NEUROGENIC CLAUDICATION: Primary | ICD-10-CM

## 2020-01-14 DIAGNOSIS — Z98.890 S/P LUMBAR LAMINECTOMY: ICD-10-CM

## 2020-01-14 DIAGNOSIS — G47.33 OSA (OBSTRUCTIVE SLEEP APNEA): ICD-10-CM

## 2020-01-14 DIAGNOSIS — E66.01 MORBID OBESITY (H): ICD-10-CM

## 2020-01-14 DIAGNOSIS — E87.70 HYPERVOLEMIA, UNSPECIFIED HYPERVOLEMIA TYPE: ICD-10-CM

## 2020-01-14 DIAGNOSIS — K59.09 CHRONIC CONSTIPATION: ICD-10-CM

## 2020-01-14 DIAGNOSIS — J45.30 MILD PERSISTENT ASTHMA, UNSPECIFIED WHETHER COMPLICATED: ICD-10-CM

## 2020-01-14 DIAGNOSIS — R53.81 PHYSICAL DECONDITIONING: ICD-10-CM

## 2020-01-14 LAB
ANION GAP SERPL CALCULATED.3IONS-SCNC: 9 MMOL/L (ref 3–14)
BUN SERPL-MCNC: 11 MG/DL (ref 7–30)
CALCIUM SERPL-MCNC: 8.6 MG/DL (ref 8.5–10.1)
CHLORIDE SERPL-SCNC: 98 MMOL/L (ref 94–109)
CO2 SERPL-SCNC: 25 MMOL/L (ref 20–32)
CREAT SERPL-MCNC: 0.47 MG/DL (ref 0.52–1.04)
ERYTHROCYTE [DISTWIDTH] IN BLOOD BY AUTOMATED COUNT: 13.2 % (ref 10–15)
GFR SERPL CREATININE-BSD FRML MDRD: >90 ML/MIN/{1.73_M2}
GLUCOSE SERPL-MCNC: 163 MG/DL (ref 70–99)
HCT VFR BLD AUTO: 35.2 % (ref 35–47)
HGB BLD-MCNC: 11.6 G/DL (ref 11.7–15.7)
MCH RBC QN AUTO: 32.2 PG (ref 26.5–33)
MCHC RBC AUTO-ENTMCNC: 33 G/DL (ref 31.5–36.5)
MCV RBC AUTO: 98 FL (ref 78–100)
PLATELET # BLD AUTO: 281 10E9/L (ref 150–450)
POTASSIUM SERPL-SCNC: 3.3 MMOL/L (ref 3.4–5.3)
RBC # BLD AUTO: 3.6 10E12/L (ref 3.8–5.2)
SODIUM SERPL-SCNC: 132 MMOL/L (ref 133–144)
WBC # BLD AUTO: 6.7 10E9/L (ref 4–11)

## 2020-01-14 PROCEDURE — 99305 1ST NF CARE MODERATE MDM 35: CPT | Performed by: INTERNAL MEDICINE

## 2020-01-14 NOTE — LETTER
1/14/2020        RE: Melissa Templeton  [id number]  [facility] [address]        [facility]  INITIAL VISIT NOTE  January 14, 2020    PRIMARY CARE PROVIDER AND CLINIC:  Dimple Grigsby [address] / [place] [address]    CHIEF COMPLAINT:  Hospital follow-up/Initial visit    HPI:    Melissa Templeton is a 69 year old  (1950) female who was seen at [facility] on January 14, 2020 for an initial visit. Medical history is notable for morbid obesity, obstructive sleep apnea, dyslipidemia, mild persistent asthma, osteoarthritis, hypertension, spinal stenosis, and prediabetes.    She was hospitalized at [facility] from January 7 through January 10, 2020 for elective lumbar spine surgery for spinal  stenosis.  She underwent L3, L4, L5 laminectomies and L3 through S1 posterior spinal fusion using intervertebral prosthesis and local autograft. Surgery was performed by Dr. Regan Collier on January 7 under general endotracheal anesthesia and blood loss was estimated at 600 mL. Hemoglobin dropped from PTA 15 to 11.9 post-operatively.  Notably, prior to surgery she was treated with prednisone and Z-Nehemiah due to asthma exacerbation.  Following the surgery, she was started on omeprazole for heartburn.    Patient is admitted to this facility for medical management, nursing care, and rehab.     Patient was seen today in her room, while lying in bed.  She complains of numbness in both legs.  Her surgical back pain is controlled.  She reports no fever, chills, chest pain, dyspnea, wheezing, palpitation, nausea, vomiting, abdominal pain, or urinary symptoms.  She states that after taking prune juice she had 3 bowel movements yesterday and 1 bowel movement today.  She stated that she will no longer use prune juice as it has given her abdominal cramps.  She reports weight gain of 15 pounds and leg edema since surgery.    CODE STATUS:   CPR/Full code      PAST MEDICAL HISTORY:   Morbid obesity, BMI 38.94  Obstructive sleep apnea, on CPAP   Dyslipidemia  Mild persistent asthma  Osteoarthritis  Hypertension  Prediabetes  GERD  Vitamin D deficiency  Allergic rhinitis  Cholelithiasis  Chronic constipation    PAST SURGICAL HISTORY:   Past Surgical History:   Procedure Laterality Date     APPENDECTOMY       ARTHROPLASTY KNEE Right 2018    Procedure: ARTHROPLASTY KNEE;  RIGHT TOTAL KNEE ARTHROPLASTY    EBL: 5mL;  Surgeon: Ambar Wilson MD;  Location: [facility] OR     ARTHROPLASTY SHOULDER Right 2019    Procedure: RIGHT TOTAL SHOULDER ARTHROPLASTY REVERSE (TORNIER)^;  Surgeon: Ambar Wilson MD;  Location:  OR     BACK SURGERY       C NONSPECIFIC PROCEDURE      Hysterectomy fibroids - bilat S&O     C NONSPECIFIC PROCEDURE      Discectomy L5-S1     C NONSPECIFIC PROCEDURE      Cosmetic below eyes     CHOLECYSTECTOMY       COLONOSCOPY       EYE SURGERY       GYN SURGERY      MARYJO BSO     LAMINECTOMY, FUSION LUMBAR THREE+ LEVEL, COMBINED N/A 2020    Procedure: L3,L4,L5 LAMINECTOMIES; POSTERIOR SPINAL FUSION; POSTERIOR LUMBAR INTERBODY FUSION L3-S1 USING INTERVERTEBRAL PROSTHESIS; LOCAL AUTOGRAFT AND INSTRUMENTATION.;  Surgeon: Regan Collier MD;  Location:  OR     ORTHOPEDIC SURGERY      dulce. rotator cuffs     ORTHOPEDIC SURGERY      lumbar laminectomy       FAMILY HISTORY:   Family History   Problem Relation Age of Onset     Diabetes Mother      Arthritis Mother      Eye Disorder Mother         MACULAR DEGENERATION     Cerebrovascular Disease Father      C.A.D. Sister      Allergies Sister        SOCIAL HISTORY:  Patient is  and lives in a house.  Since surgery, she has use a cane for ambulation.  She is a former smoker.    Social History     Tobacco Use     Smoking status: Former Smoker     Years: 10.00     Last attempt to quit:      Years since quittin.0     Smokeless tobacco: Never Used   Substance Use Topics      Alcohol use: Yes     Comment: OCCASIONALLY--2/month       MEDICATIONS:  Current Outpatient Medications   Medication Sig Dispense Refill     acetaminophen (TYLENOL) 500 MG tablet Take 1,000 mg by mouth 3 times daily       albuterol (PROAIR HFA/PROVENTIL HFA/VENTOLIN HFA) 108 (90 BASE) MCG/ACT Inhaler Inhale 2 puffs into the lungs 2 times daily as needed for shortness of breath / dyspnea or wheezing        bisacodyl (DULCOLAX) 5 MG EC tablet Take 2 tablets (10 mg) by mouth daily       cephALEXin (KEFLEX) 500 MG capsule Take 1,000 mg by mouth 2 times daily as needed (before and after dental procedure)       cholecalciferol (VITAMIN D3) 125 MCG (5000 UT) TABS tablet Take 5,000 Units by mouth daily       dextromethorphan-guaiFENesin (MUCINEX DM)  MG 12 hr tablet Take 1 tablet by mouth every morning       diclofenac (VOLTAREN) 1 % GEL topical gel Place onto the skin 2 times daily as needed for moderate pain        FIBER ADULT GUMMIES PO Take 2 chew tab by mouth every morning (FIBERWELL)        fluticasone-vilanterol (BREO ELLIPTA) 100-25 MCG/INH inhaler Inhale 1 puff into the lungs daily        folic acid (FOLVITE) 400 MCG tablet Take 400 mcg by mouth daily       furosemide (LASIX) 20 MG tablet Take 1 tablet (20 mg) by mouth daily       lidocaine (LIDODERM) 5 % Patch Place 1 patch onto the skin daily as needed for moderate pain       loratadine (CLARITIN) 10 MG tablet Take 10 mg by mouth daily        magnesium gluconate (MAGONATE) 250 MG tablet Take 250 mg by mouth every evening       Menthol, Topical Analgesic, (BIOFREEZE ROLL-ON) 4 % GEL Apply topically 2 times daily as needed (pain)       Misc Natural Products (GLUCOS-CHONDROIT-MSM COMPLEX PO) Take 1 tablet by mouth every evening       mometasone (NASONEX) 50 MCG/ACT nasal spray Spray 2 sprays into both nostrils daily       Montelukast Sodium (SINGULAIR PO) Take 10 mg by mouth every morning        Multiple Vitamins-Minerals (PRESERVISION AREDS 2 PO) Take 1  capsule by mouth 2 times daily       Omega-3 Fatty Acids (FISH OIL PO) Take 1,200 mg by mouth daily       order for DME Equipment being ordered: Walker Wheels () and Walker ()  Treatment Diagnosis: difficulty with gait 1 each 0     ORDER FOR DME Equipment being ordered: rt short cam walker 1 each 0     OVER-THE-COUNTER Take 1 Container by mouth every morning Mixed with orange juice. Vitamin supplement called Zipfizz.       oxyCODONE (ROXICODONE) 5 MG tablet Take 1 tablet (5 mg) by mouth every 4 hours as needed for moderate to severe pain 30 tablet 0     Prenatal Vit-Fe Fumarate-FA (PRENATAL 19) 29-1 MG CHEW Take 1 chew tab by mouth every evening        pseudoePHEDrine (SUDOGEST 12 HOUR) 120 MG 12 hr tablet Take 120 mg by mouth At Bedtime        senna-docusate (SENOKOT-S;PERICOLACE) 8.6-50 MG per tablet Take 1-2 tablets by mouth 2 times daily (Patient taking differently: Take 2 tablets by mouth At Bedtime ) 40 tablet 0     triamterene-HCTZ (DYAZIDE) 37.5-25 MG capsule Take 1 capsule by mouth every morning         ALLERGIES:  Allergies   Allergen Reactions     Magnesium Citrate Shortness Of Breath and Other (See Comments)     And CONFUSION     Erythromycin      nausea     Hmg-Coa-R Inhibitors Muscle Pain (Myalgia)     Nickel Rash       ROS:  10 point ROS neg other than the symptoms noted above in the HPI.    PHYSICAL EXAM:  Vitals: Blood pressure 135/76, heart rate 86, respiratory rate 16, temperature 99.2  F, oxygen saturation 95%, weight 192.8 pounds  Gen: Cooperative and in no acute distress  HEENT: Normocephalic; oropharynx clear  Card: Normal S1, S2, RRR  Resp: Lungs clear to auscultation bilaterally  GI: Abdomen soft, non-tender, non-distended, +BS  Ext: Trace B/L LE edema  Neuro: CX II-XII grossly intact; ROM in all four extremities grossly in tact  Psych: Alert and oriented x3; normal affect  Skin: No acute rash    LABORATORY/IMAGING DATA:  Lab Results   Component Value Date    WBC 6.1 11/17/2008      Lab Results   Component Value Date    RBC 4.22 11/17/2008     Lab Results   Component Value Date    HGB 11.9 01/09/2020     Lab Results   Component Value Date    HCT 38.7 11/17/2008     Lab Results   Component Value Date    MCV 92 11/17/2008     Lab Results   Component Value Date    MCH 31.1 11/17/2008     Lab Results   Component Value Date    MCHC 33.9 11/17/2008     Lab Results   Component Value Date    RDW 11.9 11/17/2008     Lab Results   Component Value Date     11/17/2008         Last Comprehensive Metabolic Panel:  Sodium   Date Value Ref Range Status   01/08/2020 137 133 - 144 mmol/L Final     Potassium   Date Value Ref Range Status   01/08/2020 4.0 3.4 - 5.3 mmol/L Final     Chloride   Date Value Ref Range Status   01/08/2020 106 94 - 109 mmol/L Final     Carbon Dioxide   Date Value Ref Range Status   01/08/2020 25 20 - 32 mmol/L Final     Anion Gap   Date Value Ref Range Status   01/08/2020 6 3 - 14 mmol/L Final     Glucose   Date Value Ref Range Status   01/08/2020 157 (H) 70 - 99 mg/dL Final     Urea Nitrogen   Date Value Ref Range Status   01/08/2020 9 7 - 30 mg/dL Final     Creatinine   Date Value Ref Range Status   01/08/2020 0.52 0.52 - 1.04 mg/dL Final     GFR Estimate   Date Value Ref Range Status   01/08/2020 >90 >60 mL/min/[1.73_m2] Final     Comment:     Non  GFR Calc  Starting 12/18/2018, serum creatinine based estimated GFR (eGFR) will be   calculated using the Chronic Kidney Disease Epidemiology Collaboration   (CKD-EPI) equation.       Calcium   Date Value Ref Range Status   01/08/2020 7.7 (L) 8.5 - 10.1 mg/dL Final         ASSESSMENT/PLAN:  Degenerative spondylolisthesis with spinal stenosis and neurogenic claudication,  Status post L3, L4, L5 laminectomies and L3 through S1 posterior spinal fusion on January 7, 2020,  Physical deconditioning.  Patient has ongoing bilateral lower extremity numbness to knees.  Plan:  Pain management with scheduled acetaminophen 1 g  p.o. 3 times daily and PRN oxycodone  Minimize the use of narcotics for prevention of confusion  Continue PT/OT evaluation and therapy  CBC on Jan 14  Follow-up with ortho as scheduled  Consider initiating gabapentin if paresthesia persists    Mild persistent asthma.  Prior to recent surgery, the patient had been treated with Z-Nehemiah and prednisone for acute asthma exacerbation.  Chest x-ray in the hospital on January 7 showed minimal probable atelectasis at the left base but no acute infiltrates.  Her respiratory status is stable today.  Plan:  Encourage frequent use of incentive spirometry  Continue PTA Singulair 10 mg p.o. daily, fluticasone-vilaterterol 100-25 mcg inhalation daily. mucinex DM daily, and prn albuterol  Monitor respiratory status    Morbid obesity, BMI 38.94,  Obstructive sleep apnea.  Plan:  Continue nocturnal CPAP at home setting    Hypertension,  Volume overload.  Blood pressure is currently controlled.  Started on furosemide 20 mg p.o. daily for volume overload with leg edema, completed on Jan 13.  Plan:  Continue PTA triamterene-hydrochlorothiazide, 37.5-35 mg p.o. daily  Monitor blood pressure, leg edema, weight  BMP on Jan 14    Chronic constipation.   She had a bowel movement yesterday and today after taking prune juice.  Plan:  Continue bowel regimen started on furosemide        Electronically signed by:  Jeff Bay MD                      Sincerely,        Jeff Bay MD

## 2020-01-16 ENCOUNTER — DISCHARGE SUMMARY NURSING HOME (OUTPATIENT)
Dept: GERIATRICS | Facility: CLINIC | Age: 70
End: 2020-01-16
Payer: COMMERCIAL

## 2020-01-16 VITALS
SYSTOLIC BLOOD PRESSURE: 146 MMHG | OXYGEN SATURATION: 94 % | TEMPERATURE: 98.8 F | HEIGHT: 59 IN | HEART RATE: 93 BPM | RESPIRATION RATE: 18 BRPM | BODY MASS INDEX: 37.09 KG/M2 | WEIGHT: 184 LBS | DIASTOLIC BLOOD PRESSURE: 68 MMHG

## 2020-01-16 DIAGNOSIS — I10 BENIGN ESSENTIAL HYPERTENSION: ICD-10-CM

## 2020-01-16 DIAGNOSIS — J45.40 MODERATE PERSISTENT ASTHMA WITHOUT COMPLICATION: ICD-10-CM

## 2020-01-16 DIAGNOSIS — M48.062 SPINAL STENOSIS OF LUMBAR REGION WITH NEUROGENIC CLAUDICATION: Primary | ICD-10-CM

## 2020-01-16 DIAGNOSIS — E66.01 MORBID OBESITY (H): ICD-10-CM

## 2020-01-16 DIAGNOSIS — K59.09 CHRONIC CONSTIPATION: ICD-10-CM

## 2020-01-16 DIAGNOSIS — Z98.890 S/P LUMBAR LAMINECTOMY: ICD-10-CM

## 2020-01-16 PROCEDURE — 99316 NF DSCHRG MGMT 30 MIN+: CPT | Performed by: NURSE PRACTITIONER

## 2020-01-16 RX ORDER — AMOXICILLIN 250 MG
2-3 CAPSULE ORAL AT BEDTIME
COMMUNITY
End: 2021-03-19

## 2020-01-16 RX ORDER — OXYCODONE HYDROCHLORIDE 5 MG/1
5 TABLET ORAL EVERY 6 HOURS PRN
Qty: 30 TABLET | Refills: 0
Start: 2020-01-16 | End: 2021-02-08

## 2020-01-16 ASSESSMENT — MIFFLIN-ST. JEOR: SCORE: 1265.25

## 2020-01-16 NOTE — PROGRESS NOTES
[place] [facility] DISCHARGE SUMMARY  PATIENT'S NAME: Melissa Templeton  YOB: 1950  MEDICAL RECORD NUMBER:  [id number]  [facility] where encounter took place[facility] [[id number]]    PRIMARY CARE PROVIDER AND CLINIC RESPONSIBLE AFTER TRANSFER:   [facility], [facility] [address] / [place] [address]    Non-FMG Provider     Transferring providers: INDIA Castro CNP, Jeff Bay MD  Recent Hospitalization/ED:  [facility] stay 01/07/20 to 01/10/20.  Date of SNF Admission: January / 10 / 2020  Date of SNF (anticipated) Discharge: January / 17 / 2020  Discharged to: previous independent home  Cognitive Scores: SLUMS: 29/30  Physical Function: Ambulating 200 ft with walker   DME: none    CODE STATUS/ADVANCE DIRECTIVES DISCUSSION:  Full Code   ALLERGIES: Magnesium citrate; Erythromycin; Hmg-coa-r inhibitors; and Nickel    DISCHARGE DIAGNOSIS/NURSING FACILITY COURSE:   Brief Summary of Hospital Course: recent hospitalization due to planned back surgery due to severe spinal stenosis, degenerative spondylolisthesis and mild osteopenia. .   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 Just prior to surgery she was treated with prednisone and Z pack due to asthma exacerbation. Pre op hgb 15.      Patient underwent  L3, L4,  & L5 laminectomies, posterior spinal fusion and posterior lumbar interbody fusion L3-L4, L4-L5, and L5-S1 on 1/7/20.      Post operative issues included ongoing wheezing and hypoxia requiring supplemental oxygen. She was started on omeprazole for heart burn.         ASSESSMENT/PLAN:  (M48.062) Spinal stenosis of lumbar region with neurogenic claudication  (primary encounter diagnosis)  (Z98.890) S/P lumbar  laminectomy  Comment: recent hospitalization due to planned back surgery due to spinal stenosis, degenerative spondylolisthesis and mild osteopenia. There were no reports surgical complications. She is taking some oxycodone- up to 4 times a day. She is taking acetaminophen. She is working with therapy. She reports the LE numbness is improving. She has progressed with therapy and is ready to return home.   Plan:  no bending or twisting.    continue acetaminophen 1gm TID  reduce Oxycodone to q 6 hours prn. Will send home 20 tablets  Follow up with ortho as planned.   Outpatient physical therapy through [facility]     (E87.71) Transfusion associated circulatory overload  Comment: patient report wt was up 9# upon transfer to TCU. She denies shortness of breath. She does report increased LE swelling. she did receive two doses of lasix while in TCU. She reports much less swelling  Plan: discontinue furosemide (LASIX) 20 MG tablet   TG shapes to LEs  Continue PTA triamterene hydrochlorothiazide 37.5mg-25mg q day     (J45.40) Moderate persistent asthma without complication  Comment: prior to surgery patient was treated with z pack and prednisone. Post surgery she did have trouble keeping O2 sats >90%. Since in TCU she has used some oxygen but upon exam she is on room air. She denies shortness of breath.   Plan: continue PTA singular 10mg q day. claritin 10mg q day, pseudoephedrine 120mg q hs, fluticasone-vilaterterol 100-25 mcg/inh q day. mucinex DM q day and prn albuterol.      (M15.0) Primary osteoarthritis involving multiple joints  Comment: h/o shoulder surgery, R TKA. 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 Long h/o constipation. She does not like miralax. She cannot take mag citrate.she does take dulolax  tabs at home with senna and some OTC fiber pills. we have adjusted bowel meds here and she is now moving her bowels.   Plan: continue current paln        (I10) Benign essential hypertension  Comment: last few BPS in /68, 125/76, 135/76  Plan: no change     (E66.01) Morbid obesity (H)  Comment: Body mass index is 38.94 kg/m . due to excess calories    Medications discussed with patient. She does not want me to send any refills for her PTA meds. She will contact her PCP.     Past Medical History:  has a past medical history of Allergic rhinitis, cause unspecified, Fluid overload, Hyperlipidemia, Hypoglycemia, unspecified, Obese, Osteoarthrosis, unspecified whether generalized or localized, unspecified site, Sleep apnea, Uncomplicated asthma, Unspecified cataract, and Vitamin D deficiency. She also has no past medical history of Complication of anesthesia.    Discharge Medications:  Current Outpatient Medications   Medication Sig Dispense Refill     acetaminophen (TYLENOL) 500 MG tablet Take 1,000 mg by mouth 3 times daily       albuterol (PROAIR HFA/PROVENTIL HFA/VENTOLIN HFA) 108 (90 BASE) MCG/ACT Inhaler Inhale 2 puffs into the lungs 2 times daily as needed for shortness of breath / dyspnea or wheezing        bisacodyl (DULCOLAX) 5 MG EC tablet Take 2 tablets (10 mg) by mouth daily       cephALEXin (KEFLEX) 500 MG capsule Take 1,000 mg by mouth 2 times daily as needed (before and after dental procedure)       cholecalciferol (VITAMIN D3) 125 MCG (5000 UT) TABS tablet Take 5,000 Units by mouth daily       dextromethorphan-guaiFENesin (MUCINEX DM)  MG 12 hr tablet Take 1 tablet by mouth every morning       diclofenac (VOLTAREN) 1 % GEL topical gel Place onto the skin 2 times daily as needed for moderate pain        FIBER ADULT GUMMIES PO Take 2 chew tab by mouth every morning (HEATHER)        fluticasone-vilanterol (BREO ELLIPTA) 100-25 MCG/INH inhaler Inhale 1 puff into the lungs daily        folic  acid (FOLVITE) 400 MCG tablet Take 400 mcg by mouth daily       lidocaine (LIDODERM) 5 % Patch Place 1 patch onto the skin daily as needed for moderate pain       loratadine (CLARITIN) 10 MG tablet Take 10 mg by mouth daily        magnesium gluconate (MAGONATE) 250 MG tablet Take 250 mg by mouth every evening       Menthol, Topical Analgesic, (BIOFREEZE ROLL-ON) 4 % GEL Apply topically 2 times daily as needed (pain)       Misc Natural Products (GLUCOS-CHONDROIT-MSM COMPLEX PO) Take 1 tablet by mouth every evening       mometasone (NASONEX) 50 MCG/ACT nasal spray Spray 2 sprays into both nostrils daily       Montelukast Sodium (SINGULAIR PO) Take 10 mg by mouth every morning        Multiple Vitamins-Minerals (PRESERVISION AREDS 2 PO) Take 1 capsule by mouth 2 times daily       Omega-3 Fatty Acids (FISH OIL PO) Take 1,200 mg by mouth daily       order for DME Equipment being ordered: Walker Wheels () and Walker ()  Treatment Diagnosis: difficulty with gait 1 each 0     ORDER FOR DME Equipment being ordered: rt short cam walker 1 each 0     oxyCODONE (ROXICODONE) 5 MG tablet Take 1 tablet (5 mg) by mouth every 6 hours as needed for moderate to severe pain 30 tablet 0     Prenatal Vit-Fe Fumarate-FA (PRENATAL 19) 29-1 MG CHEW Take 1 chew tab by mouth every evening        pseudoePHEDrine (SUDOGEST 12 HOUR) 120 MG 12 hr tablet Take 120 mg by mouth At Bedtime        senna-docusate (SENOKOT-S/PERICOLACE) 8.6-50 MG tablet Take 2 tablets by mouth At Bedtime       triamterene-HCTZ (DYAZIDE) 37.5-25 MG capsule Take 1 capsule by mouth every morning       OVER-THE-COUNTER Take 1 Container by mouth every morning Mixed with orange juice. Vitamin supplement called Zipfizz.         Medication Changes/Rationale:     1/13/20 change senna s 2 tabs hw hs    1/13/20 dulcolax 5mg q am    1/13/20 lasix 20mg q day for 5 days    1/16/20 discontinue lasix     1/26/20 change oxycodone 5mg q 6 h prn    Controlled medications sent with  "patient:   Medication: oxycodone 5mg  , 20 tabs given to patient at the time of discharge to take home     ROS:   4 point ROS including Respiratory, CV, GI and , other than that noted in the HPI,  is negative    Physical Exam:   Vitals: BP (!) 146/68   Pulse 93   Temp 98.8  F (37.1  C)   Resp 18   Ht 1.499 m (4' 11\")   Wt 83.5 kg (184 lb)   SpO2 94%   BMI 37.16 kg/m    BMI= Body mass index is 37.16 kg/m .   GENERAL APPEARANCE:  Alert, in no distress  ENT:  Mouth and posterior oropharynx normal, moist mucous membranes, hearing acuity adequate   EYES:  EOM, conjunctivae, lids, pupils and irises normal  RESP:  respiratory effort and palpation of chest normal, no respiratory distress,  ABDOMEN:  normal bowel sounds, soft, nontender,   M/S:   Gait and station not observed.slight numbness in LEs to knees, Digits and nails nl  SKIN:  Inspection/Palpation of skin and subcutaneous tissue - steristrips on spinal incision. Area is CDI, no erythema   NEURO: 2-12 in normal limits and at patient's baseline  PSYCH:  insight and judgement, memory intact , affect and mood normal    SNF labs:CBC RESULTS:   Recent Labs   Lab Test 01/14/20  0650 01/09/20  0808   WBC 6.7  --    RBC 3.60*  --    HGB 11.6* 11.9   HCT 35.2  --    MCV 98  --    MCH 32.2  --    MCHC 33.0  --    RDW 13.2  --      --        Last Basic Metabolic Panel:  Recent Labs   Lab Test 01/14/20  0650 01/08/20  0446   * 137   POTASSIUM 3.3* 4.0   CHLORIDE 98 106   MATTI 8.6 7.7*   CO2 25 25   BUN 11 9   CR 0.47* 0.52   * 157*             DISCHARGE PLAN:    Follow up labs: No labs orders/due    Medical Follow Up:      Follow up with primary care provider in 1 weeks    MTM referral needed and placed by this provider: No      Discharge Services: Out Patient:  physical therapy, occupational therapy, Outpatient [facility]    Discharge Instructions Verbalized to Patient at Discharge:     No bending or twisiting      TOTAL DISCHARGE TIME:   Greater than 30 " minutes  Electronically signed by:  INDIA Castro CNP

## 2020-01-16 NOTE — LETTER
1/16/2020        RE: Melissa Templeton  [address]  [facility] [address]        [place] [facility] DISCHARGE SUMMARY  PATIENT'S NAME: Melissa Templeton  YOB: 1950  MEDICAL RECORD NUMBER:  [id number]  Place of Service where encounter took place[facility] [[id number]]    PRIMARY CARE PROVIDER AND CLINIC RESPONSIBLE AFTER TRANSFER:   Dimple E Loeding, [facility] [address] / [place] [address]    Non-[facility] Provider     Transferring providers: INDIA Castro CNP, Jeff Bay MD  Recent Hospitalization/ED:  [facility] stay 01/07/20 to 01/10/20.  Date of SNF Admission: January / 10 / 2020  Date of SNF (anticipated) Discharge: January / 17 / 2020  Discharged to: previous independent home  Cognitive Scores: SLUMS: 29/30  Physical Function: Ambulating 200 ft with walker   DME: none    CODE STATUS/ADVANCE DIRECTIVES DISCUSSION:  Full Code   ALLERGIES: Magnesium citrate; Erythromycin; Hmg-coa-r inhibitors; and Nickel    DISCHARGE DIAGNOSIS/NURSING FACILITY COURSE:   Brief Summary of Hospital Course: recent hospitalization due to planned back surgery due to severe spinal stenosis, degenerative spondylolisthesis and mild osteopenia. .   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 Just prior to surgery she was treated with prednisone and Z pack due to asthma exacerbation. Pre op hgb 15.      Patient underwent  L3, L4,  & L5 laminectomies, posterior spinal fusion and posterior lumbar interbody fusion L3-L4, L4-L5, and L5-S1 on 1/7/20.      Post operative issues included ongoing wheezing and hypoxia requiring supplemental oxygen. She was started on omeprazole for heart burn.         ASSESSMENT/PLAN:  (M48.062) Spinal stenosis of lumbar  region with neurogenic claudication  (primary encounter diagnosis)  (Z98.890) S/P lumbar laminectomy  Comment: recent hospitalization due to planned back surgery due to spinal stenosis, degenerative spondylolisthesis and mild osteopenia. There were no reports surgical complications. She is taking some oxycodone- up to 4 times a day. She is taking acetaminophen. She is working with therapy. She reports the LE numbness is improving. She has progressed with therapy and is ready to return home.   Plan:  no bending or twisting.    continue acetaminophen 1gm TID  reduce Oxycodone to q 6 hours prn. Will send home 20 tablets  Follow up with ortho as planned.   Outpatient physical therapy through [facility]     (E87.71) Transfusion associated circulatory overload  Comment: patient report wt was up 9# upon transfer to TCU. She denies shortness of breath. She does report increased LE swelling.  she did receive two doses of lasix while in TCU. She reports much less swelling  Plan: discontinue furosemide (LASIX) 20 MG tablet   TG shapes to LEs  Continue PTA triamterene hydrochlorothiazide 37.5mg-25mg q day     (J45.40) Moderate persistent asthma without complication  Comment: prior to surgery patient was treated with z pack and prednisone. Post surgery she did have trouble keeping O2 sats >90%. Since in TCU she has used some oxygen but upon exam she is on room air. She denies shortness of breath.   Plan: continue PTA singular 10mg q day. claritin 10mg q day, pseudoephedrine 120mg q hs, fluticasone-vilaterterol 100-25 mcg/inh q day. mucinex DM q day and prn albuterol.      (M15.0) Primary osteoarthritis involving multiple joints  Comment: h/o shoulder surgery, R TKA. 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 Long h/o  constipation. She does not like miralax. She cannot take mag citrate.she does take dulolax tabs at home with senna and some OTC fiber pills.  we have adjusted bowel meds here and she is now moving her bowels.   Plan: continue current paln        (I10) Benign essential hypertension  Comment: last few BPS in /68, 125/76, 135/76  Plan: no change     (E66.01) Morbid obesity (H)  Comment: Body mass index is 38.94 kg/m . due to excess calories    Medications discussed with patient. She does not want me to send any refills for her PTA meds. She will contact her PCP.     Past Medical History:  has a past medical history of Allergic rhinitis, cause unspecified, Fluid overload, Hyperlipidemia, Hypoglycemia, unspecified, Obese, Osteoarthrosis, unspecified whether generalized or localized, unspecified site, Sleep apnea, Uncomplicated asthma, Unspecified cataract, and Vitamin D deficiency. She also has no past medical history of Complication of anesthesia.    Discharge Medications:  Current Outpatient Medications   Medication Sig Dispense Refill     acetaminophen (TYLENOL) 500 MG tablet Take 1,000 mg by mouth 3 times daily       albuterol (PROAIR HFA/PROVENTIL HFA/VENTOLIN HFA) 108 (90 BASE) MCG/ACT Inhaler Inhale 2 puffs into the lungs 2 times daily as needed for shortness of breath / dyspnea or wheezing        bisacodyl (DULCOLAX) 5 MG EC tablet Take 2 tablets (10 mg) by mouth daily       cephALEXin (KEFLEX) 500 MG capsule Take 1,000 mg by mouth 2 times daily as needed (before and after dental procedure)       cholecalciferol (VITAMIN D3) 125 MCG (5000 UT) TABS tablet Take 5,000 Units by mouth daily       dextromethorphan-guaiFENesin (MUCINEX DM)  MG 12 hr tablet Take 1 tablet by mouth every morning       diclofenac (VOLTAREN) 1 % GEL topical gel Place onto the skin 2 times daily as needed for moderate pain        FIBER ADULT GUMMIES PO Take 2 chew tab by mouth every morning (HEATHER)         fluticasone-vilanterol (BREO ELLIPTA) 100-25 MCG/INH inhaler Inhale 1 puff into the lungs daily        folic acid (FOLVITE) 400 MCG tablet Take 400 mcg by mouth daily       lidocaine (LIDODERM) 5 % Patch Place 1 patch onto the skin daily as needed for moderate pain       loratadine (CLARITIN) 10 MG tablet Take 10 mg by mouth daily        magnesium gluconate (MAGONATE) 250 MG tablet Take 250 mg by mouth every evening       Menthol, Topical Analgesic, (BIOFREEZE ROLL-ON) 4 % GEL Apply topically 2 times daily as needed (pain)       Misc Natural Products (GLUCOS-CHONDROIT-MSM COMPLEX PO) Take 1 tablet by mouth every evening       mometasone (NASONEX) 50 MCG/ACT nasal spray Spray 2 sprays into both nostrils daily       Montelukast Sodium (SINGULAIR PO) Take 10 mg by mouth every morning        Multiple Vitamins-Minerals (PRESERVISION AREDS 2 PO) Take 1 capsule by mouth 2 times daily       Omega-3 Fatty Acids (FISH OIL PO) Take 1,200 mg by mouth daily       order for DME Equipment being ordered: Walker Wheels () and Walker ()  Treatment Diagnosis: difficulty with gait 1 each 0     ORDER FOR DME Equipment being ordered: rt short cam walker 1 each 0     oxyCODONE (ROXICODONE) 5 MG tablet Take 1 tablet (5 mg) by mouth every 6 hours as needed for moderate to severe pain 30 tablet 0     Prenatal Vit-Fe Fumarate-FA (PRENATAL 19) 29-1 MG CHEW Take 1 chew tab by mouth every evening        pseudoePHEDrine (SUDOGEST 12 HOUR) 120 MG 12 hr tablet Take 120 mg by mouth At Bedtime        senna-docusate (SENOKOT-S/PERICOLACE) 8.6-50 MG tablet Take 2 tablets by mouth At Bedtime       triamterene-HCTZ (DYAZIDE) 37.5-25 MG capsule Take 1 capsule by mouth every morning       OVER-THE-COUNTER Take 1 Container by mouth every morning Mixed with orange juice. Vitamin supplement called Zipfizz.         Medication Changes/Rationale:     1/13/20 change senna s 2 tabs hw hs    1/13/20 dulcolax 5mg q am    1/13/20 lasix 20mg q day for 5  "days    1/16/20 discontinue lasix     1/26/20 change oxycodone 5mg q 6 h prn    Controlled medications sent with patient:   Medication: oxycodone 5mg  , 20 tabs given to patient at the time of discharge to take home     ROS:   4 point ROS including Respiratory, CV, GI and , other than that noted in the HPI,  is negative    Physical Exam:   Vitals: BP (!) 146/68   Pulse 93   Temp 98.8  F (37.1  C)   Resp 18   Ht 1.499 m (4' 11\")   Wt 83.5 kg (184 lb)   SpO2 94%   BMI 37.16 kg/m     BMI= Body mass index is 37.16 kg/m .   GENERAL APPEARANCE:  Alert, in no distress  ENT:  Mouth and posterior oropharynx normal, moist mucous membranes, hearing acuity adequate   EYES:  EOM, conjunctivae, lids, pupils and irises normal  RESP:  respiratory effort and palpation of chest normal, no respiratory distress,  ABDOMEN:  normal bowel sounds, soft, nontender,   M/S:   Gait and station not observed.slight numbness in LEs to knees, Digits and nails nl  SKIN:  Inspection/Palpation of skin and subcutaneous tissue - steristrips on spinal incision. Area is CDI, no erythema   NEURO: 2-12 in normal limits and at patient's baseline  PSYCH:  insight and judgement, memory intact , affect and mood normal    SNF labs:CBC RESULTS:   Recent Labs   Lab Test 01/14/20  0650 01/09/20  0808   WBC 6.7  --    RBC 3.60*  --    HGB 11.6* 11.9   HCT 35.2  --    MCV 98  --    MCH 32.2  --    MCHC 33.0  --    RDW 13.2  --      --        Last Basic Metabolic Panel:  Recent Labs   Lab Test 01/14/20  0650 01/08/20  0446   * 137   POTASSIUM 3.3* 4.0   CHLORIDE 98 106   MATTI 8.6 7.7*   CO2 25 25   BUN 11 9   CR 0.47* 0.52   * 157*             DISCHARGE PLAN:    Follow up labs: No labs orders/due    Medical Follow Up:      Follow up with primary care provider in 1 weeks    MTM referral needed and placed by this provider: No      Discharge Services: Out Patient:  physical therapy, occupational therapy, Outpatient [facility]    Discharge " Instructions Verbalized to Patient at Discharge:     No bending or twisiting      TOTAL DISCHARGE TIME:   Greater than 30 minutes  Electronically signed by:  INDIA Castro CNP                         Sincerely,        INDIA Castro CNP

## 2021-01-15 ENCOUNTER — HEALTH MAINTENANCE LETTER (OUTPATIENT)
Age: 71
End: 2021-01-15

## 2021-02-08 ENCOUNTER — APPOINTMENT (OUTPATIENT)
Dept: GENERAL RADIOLOGY | Facility: CLINIC | Age: 71
DRG: 389 | End: 2021-02-08
Attending: EMERGENCY MEDICINE
Payer: MEDICARE

## 2021-02-08 ENCOUNTER — APPOINTMENT (OUTPATIENT)
Dept: CT IMAGING | Facility: CLINIC | Age: 71
DRG: 389 | End: 2021-02-08
Attending: EMERGENCY MEDICINE
Payer: MEDICARE

## 2021-02-08 ENCOUNTER — HOSPITAL ENCOUNTER (INPATIENT)
Facility: CLINIC | Age: 71
LOS: 2 days | Discharge: HOME | DRG: 389 | End: 2021-02-10
Attending: EMERGENCY MEDICINE | Admitting: HOSPITALIST
Payer: MEDICARE

## 2021-02-08 DIAGNOSIS — R09.02 HYPOXIA: ICD-10-CM

## 2021-02-08 DIAGNOSIS — K56.609 SBO (SMALL BOWEL OBSTRUCTION) (H): ICD-10-CM

## 2021-02-08 DIAGNOSIS — R10.84 ABDOMINAL PAIN, GENERALIZED: ICD-10-CM

## 2021-02-08 DIAGNOSIS — K59.00 CONSTIPATION, UNSPECIFIED CONSTIPATION TYPE: Primary | ICD-10-CM

## 2021-02-08 PROBLEM — R10.31 RLQ ABDOMINAL PAIN: Status: ACTIVE | Noted: 2021-02-08

## 2021-02-08 LAB
ALBUMIN SERPL-MCNC: 3.7 G/DL (ref 3.4–5)
ALBUMIN UR-MCNC: NEGATIVE MG/DL
ALP SERPL-CCNC: 97 U/L (ref 40–150)
ALT SERPL W P-5'-P-CCNC: 29 U/L (ref 0–50)
ANION GAP SERPL CALCULATED.3IONS-SCNC: 9 MMOL/L (ref 3–14)
APPEARANCE UR: CLEAR
AST SERPL W P-5'-P-CCNC: 18 U/L (ref 0–45)
BASOPHILS # BLD AUTO: 0.1 10E9/L (ref 0–0.2)
BASOPHILS NFR BLD AUTO: 0.8 %
BILIRUB SERPL-MCNC: 0.5 MG/DL (ref 0.2–1.3)
BILIRUB UR QL STRIP: NEGATIVE
BUN SERPL-MCNC: 15 MG/DL (ref 7–30)
CALCIUM SERPL-MCNC: 8.9 MG/DL (ref 8.5–10.1)
CHLORIDE SERPL-SCNC: 102 MMOL/L (ref 94–109)
CO2 SERPL-SCNC: 25 MMOL/L (ref 20–32)
COLOR UR AUTO: ABNORMAL
CREAT SERPL-MCNC: 0.54 MG/DL (ref 0.52–1.04)
DIFFERENTIAL METHOD BLD: NORMAL
EOSINOPHIL # BLD AUTO: 0.1 10E9/L (ref 0–0.7)
EOSINOPHIL NFR BLD AUTO: 1.2 %
ERYTHROCYTE [DISTWIDTH] IN BLOOD BY AUTOMATED COUNT: 13 % (ref 10–15)
FLUAV RNA RESP QL NAA+PROBE: NEGATIVE
FLUBV RNA RESP QL NAA+PROBE: NEGATIVE
GFR SERPL CREATININE-BSD FRML MDRD: >90 ML/MIN/{1.73_M2}
GLUCOSE BLDC GLUCOMTR-MCNC: 155 MG/DL (ref 70–99)
GLUCOSE BLDC GLUCOMTR-MCNC: 84 MG/DL (ref 70–99)
GLUCOSE SERPL-MCNC: 133 MG/DL (ref 70–99)
GLUCOSE UR STRIP-MCNC: NEGATIVE MG/DL
HBA1C MFR BLD: 6.6 % (ref 0–5.6)
HCT VFR BLD AUTO: 42.6 % (ref 35–47)
HGB BLD-MCNC: 14.4 G/DL (ref 11.7–15.7)
HGB UR QL STRIP: NEGATIVE
IMM GRANULOCYTES # BLD: 0 10E9/L (ref 0–0.4)
IMM GRANULOCYTES NFR BLD: 0.3 %
KETONES UR STRIP-MCNC: NEGATIVE MG/DL
LABORATORY COMMENT REPORT: NORMAL
LACTATE BLD-SCNC: 2 MMOL/L (ref 0.7–2)
LACTATE BLD-SCNC: 2.6 MMOL/L (ref 0.7–2)
LEUKOCYTE ESTERASE UR QL STRIP: NEGATIVE
LIPASE SERPL-CCNC: 195 U/L (ref 73–393)
LYMPHOCYTES # BLD AUTO: 2 10E9/L (ref 0.8–5.3)
LYMPHOCYTES NFR BLD AUTO: 26.7 %
MCH RBC QN AUTO: 31.2 PG (ref 26.5–33)
MCHC RBC AUTO-ENTMCNC: 33.8 G/DL (ref 31.5–36.5)
MCV RBC AUTO: 92 FL (ref 78–100)
MONOCYTES # BLD AUTO: 0.6 10E9/L (ref 0–1.3)
MONOCYTES NFR BLD AUTO: 7.5 %
MUCOUS THREADS #/AREA URNS LPF: PRESENT /LPF
NEUTROPHILS # BLD AUTO: 4.7 10E9/L (ref 1.6–8.3)
NEUTROPHILS NFR BLD AUTO: 63.5 %
NITRATE UR QL: NEGATIVE
NRBC # BLD AUTO: 0 10*3/UL
NRBC BLD AUTO-RTO: 0 /100
PH UR STRIP: 6 PH (ref 5–7)
PLATELET # BLD AUTO: 225 10E9/L (ref 150–450)
POTASSIUM SERPL-SCNC: 3.4 MMOL/L (ref 3.4–5.3)
PROT SERPL-MCNC: 7.4 G/DL (ref 6.8–8.8)
RBC # BLD AUTO: 4.61 10E12/L (ref 3.8–5.2)
RBC #/AREA URNS AUTO: 0 /HPF (ref 0–2)
RSV RNA SPEC QL NAA+PROBE: NORMAL
SARS-COV-2 RNA RESP QL NAA+PROBE: NEGATIVE
SODIUM SERPL-SCNC: 136 MMOL/L (ref 133–144)
SOURCE: ABNORMAL
SP GR UR STRIP: 1.02 (ref 1–1.03)
SPECIMEN SOURCE: NORMAL
SQUAMOUS #/AREA URNS AUTO: 12 /HPF (ref 0–1)
TRANS CELLS #/AREA URNS HPF: 3 /HPF (ref 0–1)
UROBILINOGEN UR STRIP-MCNC: 0 MG/DL (ref 0–2)
WBC # BLD AUTO: 7.4 10E9/L (ref 4–11)
WBC #/AREA URNS AUTO: <1 /HPF (ref 0–5)

## 2021-02-08 PROCEDURE — 250N000011 HC RX IP 250 OP 636: Performed by: EMERGENCY MEDICINE

## 2021-02-08 PROCEDURE — 96374 THER/PROPH/DIAG INJ IV PUSH: CPT

## 2021-02-08 PROCEDURE — 999N001017 HC STATISTIC GLUCOSE BY METER IP

## 2021-02-08 PROCEDURE — 71045 X-RAY EXAM CHEST 1 VIEW: CPT

## 2021-02-08 PROCEDURE — 85025 COMPLETE CBC W/AUTO DIFF WBC: CPT | Performed by: EMERGENCY MEDICINE

## 2021-02-08 PROCEDURE — 258N000003 HC RX IP 258 OP 636: Performed by: EMERGENCY MEDICINE

## 2021-02-08 PROCEDURE — 87040 BLOOD CULTURE FOR BACTERIA: CPT | Performed by: EMERGENCY MEDICINE

## 2021-02-08 PROCEDURE — 250N000011 HC RX IP 250 OP 636: Performed by: HOSPITALIST

## 2021-02-08 PROCEDURE — 87636 SARSCOV2 & INF A&B AMP PRB: CPT | Performed by: EMERGENCY MEDICINE

## 2021-02-08 PROCEDURE — 99285 EMERGENCY DEPT VISIT HI MDM: CPT | Mod: 25

## 2021-02-08 PROCEDURE — 258N000001 HC RX 258: Performed by: HOSPITALIST

## 2021-02-08 PROCEDURE — 96361 HYDRATE IV INFUSION ADD-ON: CPT

## 2021-02-08 PROCEDURE — 80053 COMPREHEN METABOLIC PANEL: CPT | Performed by: EMERGENCY MEDICINE

## 2021-02-08 PROCEDURE — 83690 ASSAY OF LIPASE: CPT | Performed by: EMERGENCY MEDICINE

## 2021-02-08 PROCEDURE — 96375 TX/PRO/DX INJ NEW DRUG ADDON: CPT

## 2021-02-08 PROCEDURE — 74177 CT ABD & PELVIS W/CONTRAST: CPT

## 2021-02-08 PROCEDURE — 250N000009 HC RX 250: Performed by: EMERGENCY MEDICINE

## 2021-02-08 PROCEDURE — 99223 1ST HOSP IP/OBS HIGH 75: CPT | Mod: AI | Performed by: HOSPITALIST

## 2021-02-08 PROCEDURE — 83605 ASSAY OF LACTIC ACID: CPT | Performed by: EMERGENCY MEDICINE

## 2021-02-08 PROCEDURE — 96376 TX/PRO/DX INJ SAME DRUG ADON: CPT

## 2021-02-08 PROCEDURE — 36415 COLL VENOUS BLD VENIPUNCTURE: CPT

## 2021-02-08 PROCEDURE — 250N000013 HC RX MED GY IP 250 OP 250 PS 637: Performed by: HOSPITALIST

## 2021-02-08 PROCEDURE — 81001 URINALYSIS AUTO W/SCOPE: CPT | Performed by: EMERGENCY MEDICINE

## 2021-02-08 PROCEDURE — 120N000001 HC R&B MED SURG/OB

## 2021-02-08 PROCEDURE — C9803 HOPD COVID-19 SPEC COLLECT: HCPCS

## 2021-02-08 PROCEDURE — 83036 HEMOGLOBIN GLYCOSYLATED A1C: CPT | Performed by: EMERGENCY MEDICINE

## 2021-02-08 RX ORDER — SODIUM CHLORIDE 9 MG/ML
INJECTION, SOLUTION INTRAVENOUS CONTINUOUS
Status: DISCONTINUED | OUTPATIENT
Start: 2021-02-08 | End: 2021-02-08

## 2021-02-08 RX ORDER — NALOXONE HYDROCHLORIDE 0.4 MG/ML
0.2 INJECTION, SOLUTION INTRAMUSCULAR; INTRAVENOUS; SUBCUTANEOUS
Status: DISCONTINUED | OUTPATIENT
Start: 2021-02-08 | End: 2021-02-10 | Stop reason: HOSPADM

## 2021-02-08 RX ORDER — METHYLPREDNISOLONE 4 MG
TABLET, DOSE PACK ORAL PRN
Status: ON HOLD | COMMUNITY
End: 2021-03-16

## 2021-02-08 RX ORDER — BISACODYL 10 MG
10 SUPPOSITORY, RECTAL RECTAL DAILY PRN
Status: DISCONTINUED | OUTPATIENT
Start: 2021-02-08 | End: 2021-02-10 | Stop reason: HOSPADM

## 2021-02-08 RX ORDER — NALOXONE HYDROCHLORIDE 0.4 MG/ML
0.4 INJECTION, SOLUTION INTRAMUSCULAR; INTRAVENOUS; SUBCUTANEOUS
Status: DISCONTINUED | OUTPATIENT
Start: 2021-02-08 | End: 2021-02-10 | Stop reason: HOSPADM

## 2021-02-08 RX ORDER — LIDOCAINE 40 MG/G
CREAM TOPICAL
Status: DISCONTINUED | OUTPATIENT
Start: 2021-02-08 | End: 2021-02-10 | Stop reason: HOSPADM

## 2021-02-08 RX ORDER — GABAPENTIN 300 MG/1
600 CAPSULE ORAL 2 TIMES DAILY
Status: DISCONTINUED | OUTPATIENT
Start: 2021-02-08 | End: 2021-02-10 | Stop reason: HOSPADM

## 2021-02-08 RX ORDER — GABAPENTIN 300 MG/1
900 CAPSULE ORAL AT BEDTIME
Status: DISCONTINUED | OUTPATIENT
Start: 2021-02-08 | End: 2021-02-10 | Stop reason: HOSPADM

## 2021-02-08 RX ORDER — GABAPENTIN 300 MG/1
900 CAPSULE ORAL AT BEDTIME
Status: ON HOLD | COMMUNITY
End: 2021-03-16

## 2021-02-08 RX ORDER — DEXTROSE MONOHYDRATE 25 G/50ML
25-50 INJECTION, SOLUTION INTRAVENOUS
Status: DISCONTINUED | OUTPATIENT
Start: 2021-02-08 | End: 2021-02-10 | Stop reason: HOSPADM

## 2021-02-08 RX ORDER — MONTELUKAST SODIUM 10 MG/1
10 TABLET ORAL EVERY EVENING
Status: DISCONTINUED | OUTPATIENT
Start: 2021-02-08 | End: 2021-02-10 | Stop reason: HOSPADM

## 2021-02-08 RX ORDER — HYDROMORPHONE HYDROCHLORIDE 1 MG/ML
0.2 INJECTION, SOLUTION INTRAMUSCULAR; INTRAVENOUS; SUBCUTANEOUS
Status: DISCONTINUED | OUTPATIENT
Start: 2021-02-08 | End: 2021-02-10 | Stop reason: HOSPADM

## 2021-02-08 RX ORDER — ONDANSETRON 2 MG/ML
4 INJECTION INTRAMUSCULAR; INTRAVENOUS EVERY 30 MIN PRN
Status: DISCONTINUED | OUTPATIENT
Start: 2021-02-08 | End: 2021-02-08

## 2021-02-08 RX ORDER — ACETAMINOPHEN 325 MG/1
650 TABLET ORAL EVERY 4 HOURS PRN
Status: DISCONTINUED | OUTPATIENT
Start: 2021-02-08 | End: 2021-02-10 | Stop reason: HOSPADM

## 2021-02-08 RX ORDER — DEXTROSE MONOHYDRATE, SODIUM CHLORIDE, AND POTASSIUM CHLORIDE 50; 1.49; 9 G/1000ML; G/1000ML; G/1000ML
INJECTION, SOLUTION INTRAVENOUS CONTINUOUS
Status: DISCONTINUED | OUTPATIENT
Start: 2021-02-08 | End: 2021-02-09

## 2021-02-08 RX ORDER — ONDANSETRON 4 MG/1
4 TABLET, ORALLY DISINTEGRATING ORAL EVERY 6 HOURS PRN
Status: DISCONTINUED | OUTPATIENT
Start: 2021-02-08 | End: 2021-02-10 | Stop reason: HOSPADM

## 2021-02-08 RX ORDER — TRIAMTERENE/HYDROCHLOROTHIAZID 37.5-25 MG
1 TABLET ORAL EVERY MORNING
Status: DISCONTINUED | OUTPATIENT
Start: 2021-02-09 | End: 2021-02-10 | Stop reason: HOSPADM

## 2021-02-08 RX ORDER — MORPHINE SULFATE 4 MG/ML
4 INJECTION, SOLUTION INTRAMUSCULAR; INTRAVENOUS
Status: DISCONTINUED | OUTPATIENT
Start: 2021-02-08 | End: 2021-02-08

## 2021-02-08 RX ORDER — METFORMIN HCL 500 MG
500 TABLET, EXTENDED RELEASE 24 HR ORAL 2 TIMES DAILY
COMMUNITY
End: 2023-12-19

## 2021-02-08 RX ORDER — NICOTINE POLACRILEX 4 MG
15-30 LOZENGE BUCCAL
Status: DISCONTINUED | OUTPATIENT
Start: 2021-02-08 | End: 2021-02-10 | Stop reason: HOSPADM

## 2021-02-08 RX ORDER — ALBUTEROL SULFATE 0.83 MG/ML
3 SOLUTION RESPIRATORY (INHALATION)
Status: DISCONTINUED | OUTPATIENT
Start: 2021-02-08 | End: 2021-02-10 | Stop reason: HOSPADM

## 2021-02-08 RX ORDER — GUAIFENESIN 1200 MG/1
1 TABLET, EXTENDED RELEASE ORAL EVERY MORNING
COMMUNITY

## 2021-02-08 RX ORDER — AMOXICILLIN 250 MG
2-3 CAPSULE ORAL AT BEDTIME
Status: DISCONTINUED | OUTPATIENT
Start: 2021-02-08 | End: 2021-02-10

## 2021-02-08 RX ORDER — LORATADINE 10 MG/1
10 TABLET ORAL DAILY
Status: DISCONTINUED | OUTPATIENT
Start: 2021-02-08 | End: 2021-02-10 | Stop reason: HOSPADM

## 2021-02-08 RX ORDER — GABAPENTIN 300 MG/1
900 CAPSULE ORAL 3 TIMES DAILY
COMMUNITY
End: 2022-11-10

## 2021-02-08 RX ORDER — METHYLPREDNISOLONE SODIUM SUCCINATE 125 MG/2ML
60 INJECTION, POWDER, LYOPHILIZED, FOR SOLUTION INTRAMUSCULAR; INTRAVENOUS EVERY 12 HOURS
Status: DISCONTINUED | OUTPATIENT
Start: 2021-02-08 | End: 2021-02-09

## 2021-02-08 RX ORDER — TRIAMTERENE AND HYDROCHLOROTHIAZIDE 37.5; 25 MG/1; MG/1
1 CAPSULE ORAL EVERY MORNING
Status: DISCONTINUED | OUTPATIENT
Start: 2021-02-09 | End: 2021-02-08

## 2021-02-08 RX ORDER — PIPERACILLIN SODIUM, TAZOBACTAM SODIUM 4; .5 G/20ML; G/20ML
4.5 INJECTION, POWDER, LYOPHILIZED, FOR SOLUTION INTRAVENOUS ONCE
Status: CANCELLED | OUTPATIENT
Start: 2021-02-08 | End: 2021-02-08

## 2021-02-08 RX ORDER — ALBUTEROL SULFATE 90 UG/1
2 AEROSOL, METERED RESPIRATORY (INHALATION) 2 TIMES DAILY PRN
Status: DISCONTINUED | OUTPATIENT
Start: 2021-02-08 | End: 2021-02-10 | Stop reason: HOSPADM

## 2021-02-08 RX ORDER — IOPAMIDOL 755 MG/ML
91 INJECTION, SOLUTION INTRAVASCULAR ONCE
Status: COMPLETED | OUTPATIENT
Start: 2021-02-08 | End: 2021-02-08

## 2021-02-08 RX ORDER — PROCHLORPERAZINE MALEATE 5 MG
5 TABLET ORAL EVERY 6 HOURS PRN
Status: DISCONTINUED | OUTPATIENT
Start: 2021-02-08 | End: 2021-02-10 | Stop reason: HOSPADM

## 2021-02-08 RX ORDER — PROCHLORPERAZINE 25 MG
12.5 SUPPOSITORY, RECTAL RECTAL EVERY 12 HOURS PRN
Status: DISCONTINUED | OUTPATIENT
Start: 2021-02-08 | End: 2021-02-10 | Stop reason: HOSPADM

## 2021-02-08 RX ORDER — OXYCODONE AND ACETAMINOPHEN 5; 325 MG/1; MG/1
1-2 TABLET ORAL EVERY 4 HOURS PRN
Status: DISCONTINUED | OUTPATIENT
Start: 2021-02-08 | End: 2021-02-10 | Stop reason: HOSPADM

## 2021-02-08 RX ORDER — ONDANSETRON 2 MG/ML
4 INJECTION INTRAMUSCULAR; INTRAVENOUS EVERY 6 HOURS PRN
Status: DISCONTINUED | OUTPATIENT
Start: 2021-02-08 | End: 2021-02-10 | Stop reason: HOSPADM

## 2021-02-08 RX ADMIN — ACETAMINOPHEN 650 MG: 325 TABLET, FILM COATED ORAL at 20:18

## 2021-02-08 RX ADMIN — ONDANSETRON 4 MG: 2 INJECTION INTRAMUSCULAR; INTRAVENOUS at 09:14

## 2021-02-08 RX ADMIN — MONTELUKAST 10 MG: 10 TABLET, FILM COATED ORAL at 20:18

## 2021-02-08 RX ADMIN — IOPAMIDOL 91 ML: 755 INJECTION, SOLUTION INTRAVENOUS at 11:29

## 2021-02-08 RX ADMIN — SODIUM CHLORIDE: 9 INJECTION, SOLUTION INTRAVENOUS at 12:31

## 2021-02-08 RX ADMIN — GABAPENTIN 900 MG: 300 CAPSULE ORAL at 22:18

## 2021-02-08 RX ADMIN — METHYLPREDNISOLONE SODIUM SUCCINATE 62.5 MG: 125 INJECTION, POWDER, FOR SOLUTION INTRAMUSCULAR; INTRAVENOUS at 17:23

## 2021-02-08 RX ADMIN — POTASSIUM CHLORIDE, DEXTROSE MONOHYDRATE AND SODIUM CHLORIDE: 150; 5; 900 INJECTION, SOLUTION INTRAVENOUS at 17:16

## 2021-02-08 RX ADMIN — ALBUTEROL SULFATE 2 PUFF: 90 INHALANT RESPIRATORY (INHALATION) at 17:25

## 2021-02-08 RX ADMIN — LORATADINE 10 MG: 10 TABLET ORAL at 17:21

## 2021-02-08 RX ADMIN — SODIUM CHLORIDE 66 ML: 9 INJECTION, SOLUTION INTRAVENOUS at 11:29

## 2021-02-08 RX ADMIN — DOCUSATE SODIUM 50 MG AND SENNOSIDES 8.6 MG 3 TABLET: 8.6; 5 TABLET, FILM COATED ORAL at 22:18

## 2021-02-08 RX ADMIN — MORPHINE SULFATE 4 MG: 4 INJECTION, SOLUTION INTRAMUSCULAR; INTRAVENOUS at 09:18

## 2021-02-08 RX ADMIN — MORPHINE SULFATE 4 MG: 4 INJECTION, SOLUTION INTRAMUSCULAR; INTRAVENOUS at 15:21

## 2021-02-08 RX ADMIN — SODIUM CHLORIDE 1000 ML: 9 INJECTION, SOLUTION INTRAVENOUS at 09:13

## 2021-02-08 ASSESSMENT — MIFFLIN-ST. JEOR: SCORE: 1280.65

## 2021-02-08 ASSESSMENT — ENCOUNTER SYMPTOMS
VOMITING: 0
FEVER: 0
NAUSEA: 1
HEMATURIA: 0
ABDOMINAL PAIN: 1
DIARRHEA: 0

## 2021-02-08 ASSESSMENT — ACTIVITIES OF DAILY LIVING (ADL)
DRESSING/BATHING_DIFFICULTY: NO
VISION_MANAGEMENT: GLASSES HERE
DIFFICULTY_EATING/SWALLOWING: NO
DIFFICULTY_COMMUNICATING: NO
WALKING_OR_CLIMBING_STAIRS_DIFFICULTY: NO
CONCENTRATING,_REMEMBERING_OR_MAKING_DECISIONS_DIFFICULTY: NO
ADLS_ACUITY_SCORE: 17
TOILETING_ISSUES: NO
DOING_ERRANDS_INDEPENDENTLY_DIFFICULTY: NO
FALL_HISTORY_WITHIN_LAST_SIX_MONTHS: NO
ADLS_ACUITY_SCORE: 18
WEAR_GLASSES_OR_BLIND: YES

## 2021-02-08 NOTE — ED NOTES
[facility]  ED Nurse Handoff Report    ED Chief complaint: Abdominal Pain      ED Diagnosis:   Final diagnoses:   None       Code Status: Full Code    Allergies:   Allergies   Allergen Reactions     Magnesium Citrate Shortness Of Breath and Other (See Comments)     And CONFUSION     Erythromycin      nausea     Hmg-Coa-R Inhibitors Muscle Pain (Myalgia)     Nickel Rash       Patient Story: RLQ pain with nausea since yesterday.  Focused Assessment:  Pain with palpation. Slight nausea on arrival    Treatments and/or interventions provided: Fluids, morphine and zofran  Patient's response to treatments and/or interventions: Relief with meds    To be done/followed up on inpatient unit:  Evaluate low 02 levels.    Does this patient have any cognitive concerns?: alert    Activity level - Baseline/Home:  Independent  Activity Level - Current:   Stand with Assist    Patient's Preferred language: English   Needed?: No    Isolation: None  Infection: Not Applicable  Patient tested for COVID 19 prior to admission: YES  Bariatric?: No    Vital Signs:   Vitals:    02/08/21 1014 02/08/21 1118 02/08/21 1238 02/08/21 1240   BP: 133/75 (!) 156/91  118/64   Pulse: 75 84     Resp:  20     Temp:       TempSrc:       SpO2: 96% 98% (!) 86% 97%       Cardiac Rhythm:     Was the PSS-3 completed:   Yes  What interventions are required if any?               Family Comments:  at the bedside  OBS brochure/video discussed/provided to patient/family: N/A              Name of person given brochure if not patient: na              Relationship to patient: na    For the majority of the shift this patient's behavior was Green.   Behavioral interventions performed were na.    ED NURSE PHONE NUMBER: [phone]

## 2021-02-08 NOTE — H&P
[facility]  History and Physical   Hospitalist  Diony Galvan MD       Melissa Templeton MRN# [id number]   YOB: 1950 Age: 70 year old      Date of Admission:  2/8/2021         Assessment and Plan:   Melissa Templeton is a 70 year old morbidly obese female with PMH significant for KRISTIN (on CPAP), asthma, hypertension, chronic constipation, diabetes,h/o lumbar spinal stenosis (s/p lumbar laminectomy) who presented to ED with right lower quadrant pain abdomen and also noted hypoxic in ED    Right lower quadrant pain abdomen  likely ileus versus early small bowel obstruction  -will admit as inpatient  -CT abdomen noted with dilated small bowel loops , likely ileus versus early small bowel obstruction; also noted diverticula but no diverticulitis  -normal LFTs, normal UA  -will keep NPO, will start D5 NS with KCl at 75 ML per hour  -will have PRN pain and nausea medications available; she has had small bowel movements this morning and is passing gas, mildly nauseous but no vomiting at this time  -will consider surgery evaluation if clinically worsening    Elevated lactic acid  COVID status--- negative 2/8/21  -lactate elevated at 2.6, improved to 2.0 with IV hydration  -has normal CBC, no fever, normal UA, chest x-ray mostly unremarkable with increased basilar linear opacities suggestive of atelectasis versus developing infection  -she has no clinical suggestion of pneumonia  -will check Pro calcitonin, will hold off on empiric antibiotics at this time; monitor clinically    Hypoxia  Asthma  morbid obesity with KRISTIN, on CPAP  -she is not on any home oxygen  -chest x-ray as noted mostly unremarkable with increased basilar linear opacities suggestive of atelectasis versus developing infection; no clinical assessment of pneumonia, holding off on empiric antibiotic   -see has not been taking her Breo inhaler for past 2 to 3 days; does have mild expiratory wheezes  -will have nebulizers  PRN, Solu-Medrol Q 12 hours; will encourage incentive spirometry and will try to wean oxygen  -resume PTA inhalers when verified by pharmacy  -will continue CPAP at home settings    Hypertension  -will resume PTA thiazide when verified by pharmacy     diabetes mellitus  - she was recently started on Trulicity   - will hold off on PTA medications and will have an sliding scale insulin and is also on metformin PTA  -will check A1c ; hypoglycemia protocol    # DVT prophylaxis: Lovenox  # Code status: full code           Primary Care Physician:   Dimple Grigsby         Chief Complaint:     Pain abdomen    History is obtained from the patient         History of Present Illness:     Melissa Templeton is a 70 year old morbidly obese female with PMH significant for KRISTIN (on CPAP), asthma, hypertension, chronic constipation, diabetes,h/o lumbar spinal stenosis (s/p lumbar laminectomy) who presented to ED with right lower quadrant pain abdomen and also noted hypoxic in ED. she has been noticing right lower quadrant pain abdomen for past two days along with mild nausea but no vomiting. She denies any fever chills rigors or chest pain or shortness of breath. She has small bowel movements this morning.     The patient denies any fever, chills, rigors, chest pain or shortness of breath.   No bladder disturbances.    In ED patient was seen by Dr Corbett; initial workup was noted with mild elevated lactic acid and CT abdomen with suggestion of ileus versus small bowel obstruction; she was also noted to be hypoxic to 86% on room air; hospitalist was requested admission for further evaluation.               Past Medical History:     KRISTIN (on CPAP)  Asthma  Hypertension  chronic constipation  Diabetes  h/o lumbar spinal stenosis (s/p lumbar laminectomy)          Past Surgical History:     Past Surgical History:   Procedure Laterality Date     APPENDECTOMY       ARTHROPLASTY KNEE Right 1/23/2018    Procedure: ARTHROPLASTY KNEE;   RIGHT TOTAL KNEE ARTHROPLASTY    EBL: 5mL;  Surgeon: Ambar Wilson MD;  Location:  OR     ARTHROPLASTY SHOULDER Right 6/13/2019    Procedure: RIGHT TOTAL SHOULDER ARTHROPLASTY REVERSE (TORNIER)^;  Surgeon: Ambar Wilson MD;  Location:  OR     BACK SURGERY       CHOLECYSTECTOMY       COLONOSCOPY       EYE SURGERY       GYN SURGERY      MARYJO BSO     LAMINECTOMY, FUSION LUMBAR THREE+ LEVEL, COMBINED N/A 1/7/2020    Procedure: L3,L4,L5 LAMINECTOMIES; POSTERIOR SPINAL FUSION; POSTERIOR LUMBAR INTERBODY FUSION L3-S1 USING INTERVERTEBRAL PROSTHESIS; LOCAL AUTOGRAFT AND INSTRUMENTATION.;  Surgeon: Regan Collier MD;  Location:  OR     ORTHOPEDIC SURGERY      dulce. rotator cuffs     ORTHOPEDIC SURGERY      lumbar laminectomy     Z NONSPECIFIC PROCEDURE      Hysterectomy fibroids - bilat S&O     Z NONSPECIFIC PROCEDURE  4-98    Discectomy L5-S1     [facility] [place] NONSPECIFIC PROCEDURE  1990    Cosmetic below eyes              Home Medications:     Prior to Admission Medications   Prescriptions Last Dose Informant Patient Reported? Taking?   FIBER ADULT GUMMIES PO  Self Yes No   Sig: Take 2 chew tab by mouth every morning (FIBERWELL)    Menthol, Topical Analgesic, (BIOFREEZE ROLL-ON) 4 % GEL  Self Yes No   Sig: Apply topically 2 times daily as needed (pain)   Misc Natural Products (GLUCOS-CHONDROIT-MSM COMPLEX PO)  Self Yes No   Sig: Take 1 tablet by mouth every evening   Montelukast Sodium (SINGULAIR PO)  Self Yes No   Sig: Take 10 mg by mouth every morning    Multiple Vitamins-Minerals (PRESERVISION AREDS 2 PO)  Self Yes No   Sig: Take 1 capsule by mouth 2 times daily   ORDER FOR DME  Self No No   Sig: Equipment being ordered: rt short cam walker   OVER-THE-COUNTER  Self Yes No   Sig: Take 1 Container by mouth every morning Mixed with orange juice. Vitamin supplement called Zipfizz.   Omega-3 Fatty Acids (FISH OIL PO)  Self Yes No   Sig: Take 1,200 mg by mouth daily   Prenatal Vit-Fe Fumarate-FA (PRENATAL 19)  29-1 MG CHEW  Self Yes No   Sig: Take 1 chew tab by mouth every evening    acetaminophen (TYLENOL) 500 MG tablet  Self Yes No   Sig: Take 1,000 mg by mouth 3 times daily   albuterol (PROAIR HFA/PROVENTIL HFA/VENTOLIN HFA) 108 (90 BASE) MCG/ACT Inhaler prn med Self Yes Yes   Sig: Inhale 2 puffs into the lungs 2 times daily as needed for shortness of breath / dyspnea or wheezing    cephALEXin (KEFLEX) 500 MG capsule  Self Yes No   Sig: Take 1,000 mg by mouth 2 times daily as needed (before and after dental procedure)   cholecalciferol (VITAMIN D3) 125 MCG (5000 UT) TABS tablet 2/7/2021 at am Self Yes Yes   Sig: Take 5,000 Units by mouth daily   diclofenac (VOLTAREN) 1 % GEL topical gel  Self Yes No   Sig: Place onto the skin 2 times daily as needed for moderate pain    dulaglutide (TRULICITY) 0.75 MG/0.5ML pen 2/2/2021  Yes Yes   Sig: Inject 0.75 mg Subcutaneous every 7 days Tuesdays.   fluticasone-vilanterol (BREO ELLIPTA) 100-25 MCG/INH inhaler 2/7/2021 at Unknown time Self Yes Yes   Sig: Inhale 1 puff into the lungs daily    folic acid (FOLVITE) 400 MCG tablet  Self Yes No   Sig: Take 400 mcg by mouth daily   gabapentin (NEURONTIN) 300 MG capsule 2/8/2021 at am  Yes Yes   Sig: Take 600 mg by mouth 2 times daily 0900 + 1500   gabapentin (NEURONTIN) 300 MG capsule 2/7/2021 at 2100  Yes Yes   Sig: Take 900 mg by mouth At Bedtime   guaiFENesin (MUCINEX) 600 MG 12 hr tablet 2/7/2021 at am  Yes Yes   Sig: Take 1,200 mg by mouth daily   lidocaine (LIDODERM) 5 % Patch  Self Yes No   Sig: Place 1 patch onto the skin daily as needed for moderate pain   loratadine (CLARITIN) 10 MG tablet  Self Yes No   Sig: Take 10 mg by mouth daily    magnesium gluconate (MAGONATE) 250 MG tablet  Self Yes No   Sig: Take 250 mg by mouth every evening   metFORMIN (GLUCOPHAGE-XR) 500 MG 24 hr tablet 2/7/2021 at pm  Yes Yes   Sig: Take 1,500 mg by mouth daily (with dinner)   mometasone (NASONEX) 50 MCG/ACT nasal spray 2/7/2021 at Unknown time  Self Yes Yes   Sig: Spray 2 sprays into both nostrils daily   order for DME  Self No No   Sig: Equipment being ordered: Walker Wheels () and Walker ()  Treatment Diagnosis: difficulty with gait   oxyCODONE (ROXICODONE) 5 MG tablet   No No   Sig: Take 1 tablet (5 mg) by mouth every 6 hours as needed for moderate to severe pain   pseudoePHEDrine (SUDOGEST 12 HOUR) 120 MG 12 hr tablet 2/7/2021 at pm Self Yes Yes   Sig: Take 120 mg by mouth At Bedtime    senna-docusate (SENOKOT-S/PERICOLACE) 8.6-50 MG tablet 2/7/2021 at pm x 2 tablets  Yes Yes   Sig: Take 2-3 tablets by mouth At Bedtime Patient alternates every with 2 to 3 tablets every other night.   triamterene-HCTZ (DYAZIDE) 37.5-25 MG capsule 2/7/2021 at am Self Yes Yes   Sig: Take 1 capsule by mouth every morning      Facility-Administered Medications: None            Allergies:     Allergies   Allergen Reactions     Magnesium Citrate Shortness Of Breath and Other (See Comments)     And CONFUSION     Erythromycin      nausea     Hmg-Coa-R Inhibitors Muscle Pain (Myalgia)     Nickel Rash            Social History:   Melissa Templeton  reports that she quit smoking about 30 years ago. She quit after 10.00 years of use. She has never used smokeless tobacco. She reports current alcohol use. She reports that she does not use drugs.              Family History:   Melissa Templeton family history includes Allergies in her sister; Arthritis in her mother; C.A.D. in her sister; Cerebrovascular Disease in her father; Diabetes in her mother; Eye Disorder in her mother.    Family history was reviewed by myself and not pertinent to current presentation.           Review of Systems:   A10 point Review of Systems was done and were negative other than noted in the HPI.             Physical Exam:   Blood pressure 118/64, pulse 84, temperature 97.4  F (36.3  C), temperature source Oral, resp. rate 20, SpO2 97 %.  0 lbs 0 oz        Constitutional: Alert, awake and orienetd  X 3; lying comfortably in bed in no apparent distress   HEENT: Pupils equal and reactive to light and accomodation, EOMI intact; neck supple no raised JVD or rigidity    Oral cavity: Moist mucosa   Cardiovascular: Normal s1 s2, regular rate and rhythm, no murmur   Lungs:  mild expiratory wheezes ; no crepitations   Abdomen: Soft,  nd, mild right lower quadrant tenderness ;no guarding, rigidity or rebound; BS +   LE : No edema   Musculoskeletal: Power 5/5 in all extremities   Neuro: No focal neurological deficits noted, CN II to XII grossly intact   Psychiatry: normal mood and affect  Skin: No obvious skin rashes or ulcers             Data:   All new lab and imaging data was reviewed in Epic.   Significant labs and imagings include:    CBC and CMP mostly unremarkable except lactate 2.6---2.0  normal LFTs, normal lipase, normal UA  blood cultures pending  Covid  and influenza negative  chest x-ray reviewed by me shows slightly increased basilar linear opacities suggestive of atelectasis versus developing infection    CT abdomen:  IMPRESSION:   1.  There are a few mildly prominent fluid and gas-filled loops of  distal small bowel in the right lower quadrant, with no convincing  transition point identified. Findings could be related to ileus,  however a developing small bowel obstruction is not excluded. Clinical  correlation and follow-up are recommended.  2.  Few scattered colonic diverticula are noted, without evidence for  diverticulitis.  3.  Prior cholecystectomy and hysterectomy.               Diony Galvan MD  Hospitalist

## 2021-02-08 NOTE — PHARMACY-ADMISSION MEDICATION HISTORY
Pharmacy Medication History  Admission medication history interview status for the 2/8/2021  admission is complete. See EPIC admission navigator for prior to admission medications     Location of Interview: Phone  Medication history sources: Patient and Surescripts    Significant changes made to the medication list:  1) Metformin & gabapentin added to patient's medication list.     In the past week, patient estimated taking medication this percent of the time: greater than 90%      Medication reconciliation completed by provider prior to medication history? No    Time spent in this activity: 25 minutes    Prior to Admission medications    Medication Sig Last Dose Taking? Auth Provider   acetaminophen (TYLENOL) 500 MG tablet Take 1,000 mg by mouth 3 times daily as needed  prn med Yes Reported, Patient   albuterol (PROAIR HFA/PROVENTIL HFA/VENTOLIN HFA) 108 (90 BASE) MCG/ACT Inhaler Inhale 2 puffs into the lungs 2 times daily as needed for shortness of breath / dyspnea or wheezing  prn med Yes Reported, Patient   cholecalciferol (VITAMIN D3) 125 MCG (5000 UT) TABS tablet Take 5,000 Units by mouth daily 2/7/2021 at am Yes Reported, Patient   diclofenac (VOLTAREN) 1 % GEL topical gel Place onto the skin 2 times daily as needed for moderate pain  prn med Yes Reported, Patient   dulaglutide (TRULICITY) 0.75 MG/0.5ML pen Inject 0.75 mg Subcutaneous every 7 days Tuesdays. 2/2/2021 Yes Unknown, Entered By History   FIBER ADULT GUMMIES PO Take 2 chew tab by mouth every morning (FIBERWELL)  2/7/2021 at am Yes Reported, Patient   fluticasone-vilanterol (BREO ELLIPTA) 100-25 MCG/INH inhaler Inhale 1 puff into the lungs daily  2/7/2021 at Unknown time Yes Reported, Patient   folic acid (FOLVITE) 400 MCG tablet Take 400 mcg by mouth daily 2/7/2021 at am Yes Reported, Patient   gabapentin (NEURONTIN) 300 MG capsule Take 600 mg by mouth 2 times daily 0900 + 1500 2/8/2021 at am Yes Unknown, Entered By History   gabapentin (NEURONTIN)  300 MG capsule Take 900 mg by mouth At Bedtime 2/7/2021 at 2100 Yes Unknown, Entered By History   guaiFENesin (MUCINEX) 600 MG 12 hr tablet Take 1,200 mg by mouth daily 2/7/2021 at am Yes Unknown, Entered By History   lidocaine (LIDODERM) 5 % Patch Place 1 patch onto the skin daily as needed for moderate pain prn med Yes Reported, Patient   loratadine (CLARITIN) 10 MG tablet Take 10 mg by mouth daily  2/7/2021 at am Yes Reported, Patient   magnesium gluconate (MAGONATE) 250 MG tablet Take 250 mg by mouth every evening 2/7/2021 at pm Yes Reported, Patient   Menthol, Topical Analgesic, (BIOFREEZE ROLL-ON) 4 % GEL Apply topically 2 times daily as needed (pain) prn med Yes Reported, Patient   metFORMIN (GLUCOPHAGE-XR) 500 MG 24 hr tablet Take 1,500 mg by mouth daily (with dinner) 2/7/2021 at pm Yes Unknown, Entered By History   methylPREDNISolone (MEDROL DOSEPAK) 4 MG tablet therapy pack Take by mouth as needed Follow Package Directions - URI - if needed. prn med Yes Unknown, Entered By History   mometasone (NASONEX) 50 MCG/ACT nasal spray Spray 2 sprays into both nostrils daily 2/7/2021 at Unknown time Yes Reported, Patient   Montelukast Sodium (SINGULAIR PO) Take 10 mg by mouth every evening  2/7/2021 at pm Yes Reported, Patient   Multiple Vitamins-Minerals (PRESERVISION AREDS 2 PO) Take 1 capsule by mouth 2 times daily 2/7/2021 at Unknown time Yes Reported, Patient   Prenatal Vit-Fe Fumarate-FA (PRENATAL 19) 29-1 MG CHEW Take 1 chew tab by mouth every evening  2/7/2021 at pm Yes Reported, Patient   pseudoePHEDrine (SUDOGEST 12 HOUR) 120 MG 12 hr tablet Take 120 mg by mouth At Bedtime  2/7/2021 at pm Yes Reported, Patient   senna-docusate (SENOKOT-S/PERICOLACE) 8.6-50 MG tablet Take 2-3 tablets by mouth At Bedtime Patient alternates every with 2 to 3 tablets every other night. 2/7/2021 at pm x 2 tablets Yes Reported, Patient   triamterene-HCTZ (DYAZIDE) 37.5-25 MG capsule Take 1 capsule by mouth every morning 2/7/2021  at am Yes Reported, Patient   cephALEXin (KEFLEX) 500 MG capsule Take 1,000 mg by mouth 2 times daily as needed (before and after dental procedure) prn med  Reported, Patient   order for DME Equipment being ordered: Walker Wheels () and Walker ()  Treatment Diagnosis: difficulty with gait   Ambar Wilson MD   ORDER FOR DME Equipment being ordered: rt short cam walker   Dwayne Bejarano, DO   OVER-THE-COUNTER Take 1 Container by mouth every morning Mixed with orange juice. Vitamin supplement called Zipfizz.   Reported, Patient       The information provided in this note is only as accurate as the sources available at the time of update(s)

## 2021-02-08 NOTE — ED PROVIDER NOTES
History     Chief Complaint:  Abdominal Pain     HPI   Melissa Templeton is a 70 year old female with history of hypertension, hyperlipidemia, constipation, asthma, who presents for evaluation of abdominal pain. The patient reports that yesterday at noon she was cleaning the floors when she had a sudden onset of right lower quadrant abdominal pain that felt like a gas bubble. The patient states that the pain is non radiating and she has had associated nausea. The pain is constant and when she sits or stands, it becomes substantially worse and she gets diaphoretic. She has had a bowel movement since onset of pain but notes that she is passing very little gas. The patient denies fever, urinary symptoms including hematuria, vomiting, diarrhea, or history of a hernia. She has had a prior appendectomy. She has not taken anything for the pain prior.     Review of Systems   Constitutional: Negative for fever.   Gastrointestinal: Positive for abdominal pain and nausea. Negative for diarrhea and vomiting.   Genitourinary: Negative for hematuria.        No urinary symptoms   All other systems reviewed and are negative.      Allergies:  Magnesium Citrate  Erythromycin  Hmg-Coa-R Inhibitors  Nickel    Medications:  albuterol   bisacodyl   Keflex  fluticasone-vilanterol inhaler  magnesium gluconate   Montelukast Sodium   Oxycodone   senna-docusate  triamterene-hydrochlorothiazide     Past Medical History:    Allergic rhinitis  Fluid overload   Hyperlipidemia   Hypoglycemia  Obese   Osteoarthrosis  Sleep apnea   Asthma   cataract   Vitamin D deficiency   Constipation   Hypertension   Spinal stenosis     Past Surgical History:    Appendectomy  Arthroplasty knee   Arthroplasty shoulder   Lumbar laminectomy  Choleystectomy   Eye surgery  MARYJO BSO  Bilateral rotator cuff surgery  Discectomy L5-S1  Cosmetic surgery eyes    Family History:    Mother: diabetes, arthritis, macular degeneration   Father: Cerebrovascular Disease  Sister:  Coronary Artery Disease, allergies,     Social History:  The patient was accompanied to the ED by family member.  PCP: Dimple Grigsby     Physical Exam     Patient Vitals for the past 24 hrs:   BP Temp Temp src Pulse Resp SpO2   02/08/21 1240 118/64 -- -- -- -- 97 %   02/08/21 1238 -- -- -- -- -- (!) 86 %   02/08/21 1118 (!) 156/91 -- -- 84 20 98 %   02/08/21 1014 133/75 -- -- 75 -- 96 %   02/08/21 0959 133/75 -- -- -- -- 95 %   02/08/21 0918 -- -- -- -- -- (!) 88 %   02/08/21 0839 (!) 178/94 97.4  F (36.3  C) Oral 89 20 96 %     Physical Exam  General: Well-nourished, appears to be uncomfortable  Eyes: PERRL, conjunctivae pink no scleral icterus or conjunctival injection  ENT:  Moist mucus membranes, posterior oropharynx clear without erythema or exudates  Respiratory:  Lungs clear to auscultation bilaterally, no crackles/rubs/wheezes.  Good air movement  CV: Normal rate and rhythm, no murmurs/rubs/gallops  GI:  Abdomen soft and non-distended.  Normoactive BS.  Right sided tenderness worst in RLQ, no guarding or rebound  Skin: Warm, dry.  No rashes or petechiae  Musculoskeletal: No peripheral edema or calf tenderness  Neuro: Alert and oriented to person/place/time  Psychiatric: Normal affect      Emergency Department Course     Imaging:    XR Chest Port 1 View  Increased bibasilar linear opacities which could represent  linear atelectasis or developing infection. No pneumothorax or pleural  effusions. Normal heart size. Atherosclerotic aortic calcifications.  Right reverse TSA partly visualized. Left shoulder degenerative  changes.  IWONA DAVENPORT MD  Reading per radiology    CT Abdomen Pelvis w Contrast  1.  There are a few mildly prominent fluid and gas-filled loops of  distal small bowel in the right lower quadrant, with no convincing  transition point identified. Findings could be related to ileus,  however a developing small bowel obstruction is not excluded. Clinical  correlation and follow-up are  recommended.  2.  Few scattered colonic diverticula are noted, without evidence for  diverticulitis.  3.  Prior cholecystectomy and hysterectomy.  MILKA RIVERA MD  Reading per radiology     Laboratory:    Symptomatic Influenza A/B & SARS-CoV2 (COVID-19) Virus PCR Multiplex: Negative       UA: Squamous Epithelial 12 (H), Transitional Epi 3 (H), Mucous present (A), o/w WNL.     CBC: WBC 7.4, HGB 14.4,   CMP: Glucose 133 (H), o/w WNL (Creatinine 0.54)    Lipase: 195    Lactic Acid (Resulted: 0915): 2.6 (H)   Lactic Acid (Resulted: 1204): 2.0     Blood Culture x2: Pending     Emergency Department Course:    Reviewed:    I reviewed the patient's nursing notes, vitals, past medical records, Care Everywhere.     Assessments:    0857 I performed an exam of the patient as documented above.     1404 Patient rechecked and updated.      Consults:     1414 I spoke with Dr. Galvan of the hospitalist service from [facility] regarding patient's presentation, findings, and plan of care.     Interventions:  0913 NS 1000 mL IV  0914 Zofran 4 mg IV   0918 morphine 4 mg IV   1231 NS IV     Disposition:  The patient was admitted to the hospital under the care of Dr. Galvan.     Impression & Plan      CMS Diagnoses: The Lactic acid level is elevated due to dehydration and hypoxia, at this time there is no sign of severe sepsis or septic shock.     Covid-19  Melissa Templeton was evaluated during a global COVID-19 pandemic, which necessitated consideration that the patient might be at risk for infection with the SARS-CoV-2 virus that causes COVID-19.   Applicable protocols for evaluation were followed during the patient's care.   COVID-19 was considered as part of the patient's evaluation. The plan for testing is:  a test was obtained during this visit.    Medical Decision Making:  Melissa Templeton is a 70 year old female who presents to the emergency department today for evaluation of abdominal pain.  Laboratory  studies show a normal WBC and are otherwise reassuring. CT scan was obtained and fortuanately shows no signs of colitis or other abnormality. Findings could be consistent with ileus vs early developing small bowel obstruction. No signs of mechanical obstruction or internal hernia.  She is status post appendectomy, cholecystectomy and hysterectomy. Initial lactic acid was elevated but this returned to normal after IV fluid hydration. I do not believe this represents sepsis and so antibiotics were not given due to risk of harm outweighing benefits. She continues to have pain recurring. She was noted to by hypoxic after receiving morphine and it was initially felt due to the sedation.  However, patient did become hypoxic again. There is no clear cause. COVID testing was negative and she doesn't have other symptoms of pneumonia.  She does note that she has asthma and did not use her inhaler the previous two days.  Perhaps she has an underlying oxygen requirement that has been undiagnosed. She is not wheezing at this time and does not require bipap or advanced respiratory support at this time as she is stable on oxygen.  She will be admitted for concern of possible small bowel obstruction and abdominal pain as well as hypoxia.  She was in agreement with the plan for admission and Dr. Galvan graciously agreed to admit her.    Diagnosis:    ICD-10-CM    1. Abdominal pain, generalized  R10.84    2. Hypoxia  R09.02      Scribe Disclosure:  I, Orla Severson, am serving as a scribe at 8:42 AM on 2/8/2021 to document services personally performed by Renee Corbtet MD based on my observations and the provider's statements to me.     [facility] EMERGENCY DEPT         Renee Corbett MD  02/09/21 [id number]

## 2021-02-08 NOTE — PROGRESS NOTES
RECEIVING UNIT ED HANDOFF REVIEW    ED Nurse Handoff Report was reviewed by: Iain Barton RN on February 8, 2021 at 3:28 PM

## 2021-02-09 ENCOUNTER — APPOINTMENT (OUTPATIENT)
Dept: GENERAL RADIOLOGY | Facility: CLINIC | Age: 71
DRG: 389 | End: 2021-02-09
Attending: INTERNAL MEDICINE
Payer: MEDICARE

## 2021-02-09 LAB
ANION GAP SERPL CALCULATED.3IONS-SCNC: 7 MMOL/L (ref 3–14)
BUN SERPL-MCNC: 13 MG/DL (ref 7–30)
CALCIUM SERPL-MCNC: 8.7 MG/DL (ref 8.5–10.1)
CHLORIDE SERPL-SCNC: 106 MMOL/L (ref 94–109)
CO2 SERPL-SCNC: 24 MMOL/L (ref 20–32)
CREAT SERPL-MCNC: 0.52 MG/DL (ref 0.52–1.04)
GFR SERPL CREATININE-BSD FRML MDRD: >90 ML/MIN/{1.73_M2}
GLUCOSE BLDC GLUCOMTR-MCNC: 105 MG/DL (ref 70–99)
GLUCOSE BLDC GLUCOMTR-MCNC: 136 MG/DL (ref 70–99)
GLUCOSE BLDC GLUCOMTR-MCNC: 144 MG/DL (ref 70–99)
GLUCOSE BLDC GLUCOMTR-MCNC: 160 MG/DL (ref 70–99)
GLUCOSE BLDC GLUCOMTR-MCNC: 161 MG/DL (ref 70–99)
GLUCOSE BLDC GLUCOMTR-MCNC: 210 MG/DL (ref 70–99)
GLUCOSE SERPL-MCNC: 175 MG/DL (ref 70–99)
POTASSIUM SERPL-SCNC: 4.2 MMOL/L (ref 3.4–5.3)
PROCALCITONIN SERPL-MCNC: <0.05 NG/ML
SODIUM SERPL-SCNC: 137 MMOL/L (ref 133–144)

## 2021-02-09 PROCEDURE — 74018 RADEX ABDOMEN 1 VIEW: CPT

## 2021-02-09 PROCEDURE — 36415 COLL VENOUS BLD VENIPUNCTURE: CPT | Performed by: HOSPITALIST

## 2021-02-09 PROCEDURE — 120N000001 HC R&B MED SURG/OB

## 2021-02-09 PROCEDURE — 250N000013 HC RX MED GY IP 250 OP 250 PS 637: Performed by: HOSPITALIST

## 2021-02-09 PROCEDURE — 999N001017 HC STATISTIC GLUCOSE BY METER IP

## 2021-02-09 PROCEDURE — 99232 SBSQ HOSP IP/OBS MODERATE 35: CPT | Performed by: INTERNAL MEDICINE

## 2021-02-09 PROCEDURE — 258N000001 HC RX 258: Performed by: HOSPITALIST

## 2021-02-09 PROCEDURE — 250N000011 HC RX IP 250 OP 636: Performed by: HOSPITALIST

## 2021-02-09 PROCEDURE — 84145 PROCALCITONIN (PCT): CPT | Performed by: HOSPITALIST

## 2021-02-09 PROCEDURE — 250N000012 HC RX MED GY IP 250 OP 636 PS 637: Performed by: HOSPITALIST

## 2021-02-09 PROCEDURE — 80048 BASIC METABOLIC PNL TOTAL CA: CPT | Performed by: HOSPITALIST

## 2021-02-09 RX ORDER — PREDNISONE 20 MG/1
40 TABLET ORAL DAILY
Status: DISCONTINUED | OUTPATIENT
Start: 2021-02-10 | End: 2021-02-10 | Stop reason: HOSPADM

## 2021-02-09 RX ORDER — NICOTINE POLACRILEX 4 MG
15-30 LOZENGE BUCCAL
Status: DISCONTINUED | OUTPATIENT
Start: 2021-02-09 | End: 2021-02-09

## 2021-02-09 RX ORDER — DEXTROSE MONOHYDRATE 25 G/50ML
25-50 INJECTION, SOLUTION INTRAVENOUS
Status: DISCONTINUED | OUTPATIENT
Start: 2021-02-09 | End: 2021-02-09

## 2021-02-09 RX ADMIN — OXYCODONE HYDROCHLORIDE AND ACETAMINOPHEN 1 TABLET: 5; 325 TABLET ORAL at 00:07

## 2021-02-09 RX ADMIN — TRIAMTERENE AND HYDROCHLOROTHIAZIDE 1 TABLET: 37.5; 25 TABLET ORAL at 08:04

## 2021-02-09 RX ADMIN — POTASSIUM CHLORIDE, DEXTROSE MONOHYDRATE AND SODIUM CHLORIDE: 150; 5; 900 INJECTION, SOLUTION INTRAVENOUS at 08:17

## 2021-02-09 RX ADMIN — METHYLPREDNISOLONE SODIUM SUCCINATE 62.5 MG: 125 INJECTION, POWDER, FOR SOLUTION INTRAMUSCULAR; INTRAVENOUS at 04:12

## 2021-02-09 RX ADMIN — MONTELUKAST 10 MG: 10 TABLET, FILM COATED ORAL at 21:06

## 2021-02-09 RX ADMIN — LORATADINE 10 MG: 10 TABLET ORAL at 08:04

## 2021-02-09 RX ADMIN — OXYCODONE HYDROCHLORIDE AND ACETAMINOPHEN 1 TABLET: 5; 325 TABLET ORAL at 08:03

## 2021-02-09 RX ADMIN — GABAPENTIN 600 MG: 300 CAPSULE ORAL at 08:04

## 2021-02-09 RX ADMIN — DOCUSATE SODIUM 50 MG AND SENNOSIDES 8.6 MG 2 TABLET: 8.6; 5 TABLET, FILM COATED ORAL at 21:06

## 2021-02-09 RX ADMIN — GABAPENTIN 600 MG: 300 CAPSULE ORAL at 15:32

## 2021-02-09 RX ADMIN — GABAPENTIN 900 MG: 300 CAPSULE ORAL at 21:06

## 2021-02-09 ASSESSMENT — ACTIVITIES OF DAILY LIVING (ADL)
ADLS_ACUITY_SCORE: 17

## 2021-02-09 NOTE — PROGRESS NOTES
[facility]    Medicine Progress Note - Hospitalist Service        Date of Admission:  2/8/2021  8:41 AM    Assessment & Plan:   Melissa Templeton is a 70 year old morbidly obese female with PMH significant for KRISTIN (on CPAP), asthma, hypertension, chronic constipation, diabetes,h/o lumbar spinal stenosis (s/p lumbar laminectomy) who presented to ED with right lower quadrant pain abdomen and also noted hypoxic in ED     Likely ileus versus early small bowel obstruction  -CT abdomen noted with dilated small bowel loops, likely ileus versus early small bowel obstruction; also noted diverticula but no diverticulitis  -normal LFTs, normal UA  -Patient reports slight improvement in overall pain  -Denies nausea or vomiting, requesting enema for constipation  -Repeat x-ray this morning shows a few mildly prominent loops of small bowel in the right midabdomen but no convincing radiographic evidence of small bowel obstruction  -We will try tapwater enema x1  -Start clear liquids, monitor clinical course, if continues to have pain will consult surgery      Hypoxia  History asthma  Basilar atelectasis  Morbid obesity with KRISTIN, on CPAP  -she is not on any home oxygen  -chest x-ray as noted mostly unremarkable with increased basilar linear opacities suggestive of atelectasis versus developing infection; no clinical assessment of pneumonia, holding off on empiric antibiotic   -Continue nebulizers PRN,  discontinue Solu-Medrol.  Start prednisone starting tomorrow morning  -Continue PTA inhalers  -CPAP per home setting  -incentive spirometry     Hypertension  -continue PTA thiazide      Diabetes mellitus-2  -she was recently started on Trulicity   -sliding scale insulin for now  -hypoglycemia protocol    Diet: Clear Liquid Diet     DVT Prophylaxis: Enoxaparin (Lovenox) SQ   Concepcion Catheter: not present  Code Status: Full Code     Disposition Plan    Expected discharge: Tomorrow if continues to have improvement with  abdominal pain and resolution of ileus/early small bowel obstruction.   Entered: Oscar Hassan MD 02/09/2021, 1:07 PM        The patient's care was discussed with the Bedside Nurse and Patient.    Oscar Hassan MD  Hospitalist Service  [facility]    ______________________________________________________________________    Interval History   Abdominal pain improved.  No nausea or vomiting.  Still feels constipated, once enema.  No chest pain or dyspnea.    Data reviewed today: I reviewed all medications, new labs and imaging results over the last 24 hours. I personally reviewed no images or EKG's today.    Physical Exam   Vital signs:  Temp: 98.4  F (36.9  C) Temp src: Oral BP: 108/55 Pulse: 78   Resp: 18 SpO2: 94 % O2 Device: Nasal cannula Oxygen Delivery: 2 LPM Height: 152.4 cm (5') Weight: 83.9 kg (185 lb)  Estimated body mass index is 36.13 kg/m  as calculated from the following:    Height as of this encounter: 1.524 m (5').    Weight as of this encounter: 83.9 kg (185 lb).      Wt Readings from Last 2 Encounters:   02/08/21 83.9 kg (185 lb)   01/16/20 83.5 kg (184 lb)       Gen: AAOX3, NAD, comfortable  HEENT: Supple neck, moist oral mucosa, no pallor  Resp: Faint wheezes bilaterally, normal effort of breathing  CVS: RRR, no murmur  Abd/GI: Soft, minimal right lower quadrant tenderness, no guarding or rebound or rigidity. BS-somewhat hypoactive  Skin: Warm, dry no rashes  MSK: No joint deformities, no pedal edema  Neuro- CN- intact. No focal deficits.       Data   Recent Labs   Lab 02/09/21  0836 02/08/21  0901   WBC  --  7.4   HGB  --  14.4   MCV  --  92   PLT  --  225    136   POTASSIUM 4.2 3.4   CHLORIDE 106 102   CO2 24 25   BUN 13 15   CR 0.52 0.54   ANIONGAP 7 9   MATTI 8.7 8.9   * 133*   ALBUMIN  --  3.7   PROTTOTAL  --  7.4   BILITOTAL  --  0.5   ALKPHOS  --  97   ALT  --  29   AST  --  18   LIPASE  --  195       Recent Results (from the past 24 hour(s))   XR Abdomen 1  View    Narrative    ABDOMEN ONE VIEW February 9, 2021 9:17 AM     HISTORY: Follow-up for small bowel obstruction.    COMPARISON: CT of the abdomen and pelvis performed 2/8/2021.      Impression    IMPRESSION: A few mildly prominent loops of small bowel are again  noted in the right midabdomen, but no convincing radiographic evidence  for small bowel obstruction. Unremarkable amount of stool throughout  the colon. Surgical clips RUQ. Postoperative changes of posterior ezekiel  and pedicle screw fusion at L3-S1.    MILKA RIVERA MD     Medications     dextrose 5% and 0.9% NaCl with potassium chloride 20 mEq 75 mL/hr at 02/09/21 0817       enoxaparin ANTICOAGULANT  40 mg Subcutaneous Q24H     fluticasone-vilanterol  1 puff Inhalation Daily     gabapentin  600 mg Oral BID     gabapentin  900 mg Oral At Bedtime     insulin aspart  1-6 Units Subcutaneous Q4H     loratadine  10 mg Oral Daily     methylPREDNISolone  62.5 mg Intravenous Q12H     montelukast  10 mg Oral QPM     senna-docusate  2-3 tablet Oral At Bedtime     triamterene-HCTZ  1 tablet Oral QAM

## 2021-02-09 NOTE — PLAN OF CARE
Summary:  admit with RLQ pain and incidental finding of O2 at 86% on RA   DATE & TIME: 2/9/21 700- 1930  Cognitive Concerns/ Orientation : orientedx4 calm and cooperative   BEHAVIOR & AGGRESSION TOOL COLOR: green  CIWA SCORE: n/a   ABNL VS/O2: VSS on RA sats 92-94%;Dyspneic on exertion  MOBILITY: up independently   PAIN MANAGMENT: C/o RLQ abdominal pain 2-3/10, PRN  Percocet x1 helpful  DIET:FL  BOWEL/BLADDER: continent. Bowel sounds  hypoactive. Reported passing some gas  ABNL LAB/BG: ,161,105  DRAIN/DEVICES: PIV  SL  TELEMETRY RHYTHM: n/a  SKIN: WDL   TESTS/PROCEDURES: ABD. Xray completed   DISCHARGE;pending progress.possible discharge tomorrow, if tolerates diet and pain under conrtol..  OTHER IMPORTANT INFO: encourage ambulation and IS use. Had Small BM after  Tap water enema,  refused sliding scale insulin.

## 2021-02-09 NOTE — PLAN OF CARE
Summary:  admit with RLQ pain and incidental finding of O2 at 86% on RA   DATE & TIME: 2/8/21 3-7pm shift   Cognitive Concerns/ Orientation : orientedx4 calm and cooperative   BEHAVIOR & AGGRESSION TOOL COLOR: green  CIWA SCORE: n/a   ABNL VS/O2: VSS on 2L NC. Dynic and wheeze on expiration heard in [facility]. Asthma hx, person CPAP for sleeping.  MOBILITY: up independently   PAIN MANAGMENT: denies pain at this time. Explained options to try oral first if pain recurs.  DIET: NPO ice chips  BOWEL/BLADDER: continent to bathroom. Bowel sounds faint and hypoactive. States no gas since this AM. Note mentions small BM early this morning.  ABNL LAB/BG: gluc 84 before D5 fluids started. L.A. 2.0 on recheck  DRAIN/DEVICES: PIV infusing D5 NS with 20KCLat 75ml/hr  TELEMETRY RHYTHM: n/a  SKIN: WDL distended abdomen  TESTS/PROCEDURES: pending improvement  D/C DAY/GOALS/PLACE: pending improvement in oxygen and bowels   OTHER IMPORTANT INFO: encouraging ambulation and IS use. Wants to take 3 tabs of senna today from her PTA routine of 2-3 tabs/day.

## 2021-02-09 NOTE — PLAN OF CARE
Cognitive Concerns/ Orientation : orientedx4 calm and cooperative   BEHAVIOR & AGGRESSION TOOL COLOR: green  CIWA SCORE: n/a   ABNL VS/O2: VSS on 2L; CPAP on for sleeping. Dyspneic on exertion  MOBILITY: up independently   PAIN MANAGMENT: C/o RLQ abdominal pain 2-3/10, PRN Tylenol and Percocet x1 helpful  DIET: NPO ice chips  BOWEL/BLADDER: continent. Bowel sounds faint and hypoactive. Reported passing some gas  ABNL LAB/BG: , 210, 144  DRAIN/DEVICES: PIV infusing D5 NS with 20mEq KCL 75ml/hr  TELEMETRY RHYTHM: n/a  SKIN: WDL   TESTS/PROCEDURES: pending improvement  D/C DAY/GOALS/PLACE: pending improvement in oxygen and bowels   OTHER IMPORTANT INFO: encourage ambulation and IS use.

## 2021-02-10 ENCOUNTER — MEDICAL CORRESPONDENCE (OUTPATIENT)
Dept: HEALTH INFORMATION MANAGEMENT | Facility: CLINIC | Age: 71
End: 2021-02-10

## 2021-02-10 ENCOUNTER — TRANSFERRED RECORDS (OUTPATIENT)
Dept: HEALTH INFORMATION MANAGEMENT | Facility: CLINIC | Age: 71
End: 2021-02-10

## 2021-02-10 VITALS
TEMPERATURE: 98.6 F | HEART RATE: 78 BPM | BODY MASS INDEX: 36.32 KG/M2 | RESPIRATION RATE: 18 BRPM | OXYGEN SATURATION: 94 % | DIASTOLIC BLOOD PRESSURE: 96 MMHG | HEIGHT: 60 IN | SYSTOLIC BLOOD PRESSURE: 157 MMHG | WEIGHT: 185 LBS

## 2021-02-10 LAB
GLUCOSE BLDC GLUCOMTR-MCNC: 116 MG/DL (ref 70–99)
GLUCOSE BLDC GLUCOMTR-MCNC: 123 MG/DL (ref 70–99)
GLUCOSE BLDC GLUCOMTR-MCNC: 238 MG/DL (ref 70–99)

## 2021-02-10 PROCEDURE — 999N001017 HC STATISTIC GLUCOSE BY METER IP

## 2021-02-10 PROCEDURE — 250N000013 HC RX MED GY IP 250 OP 250 PS 637: Performed by: HOSPITALIST

## 2021-02-10 PROCEDURE — 250N000012 HC RX MED GY IP 250 OP 636 PS 637: Performed by: INTERNAL MEDICINE

## 2021-02-10 PROCEDURE — 99238 HOSP IP/OBS DSCHRG MGMT 30/<: CPT | Performed by: INTERNAL MEDICINE

## 2021-02-10 RX ORDER — POLYETHYLENE GLYCOL 3350 17 G/17G
17 POWDER, FOR SOLUTION ORAL DAILY
Status: DISCONTINUED | OUTPATIENT
Start: 2021-02-10 | End: 2021-02-10 | Stop reason: HOSPADM

## 2021-02-10 RX ORDER — SENNOSIDES A AND B 8.6 MG/1
1 TABLET, FILM COATED ORAL DAILY PRN
Qty: 30 TABLET | Status: ON HOLD | COMMUNITY
Start: 2021-02-10 | End: 2021-03-16

## 2021-02-10 RX ORDER — SENNOSIDES 8.6 MG
1-2 TABLET ORAL 2 TIMES DAILY
Status: DISCONTINUED | OUTPATIENT
Start: 2021-02-10 | End: 2021-02-10 | Stop reason: HOSPADM

## 2021-02-10 RX ORDER — POLYETHYLENE GLYCOL 3350 17 G/17G
17 POWDER, FOR SOLUTION ORAL DAILY
Qty: 510 G | Status: ON HOLD | COMMUNITY
Start: 2021-02-10 | End: 2021-03-16

## 2021-02-10 RX ADMIN — GABAPENTIN 600 MG: 300 CAPSULE ORAL at 08:30

## 2021-02-10 RX ADMIN — TRIAMTERENE AND HYDROCHLOROTHIAZIDE 1 TABLET: 37.5; 25 TABLET ORAL at 08:30

## 2021-02-10 RX ADMIN — LORATADINE 10 MG: 10 TABLET ORAL at 08:30

## 2021-02-10 RX ADMIN — ACETAMINOPHEN 650 MG: 325 TABLET, FILM COATED ORAL at 08:36

## 2021-02-10 RX ADMIN — PREDNISONE 40 MG: 20 TABLET ORAL at 08:30

## 2021-02-10 RX ADMIN — ALBUTEROL SULFATE 2 PUFF: 90 INHALANT RESPIRATORY (INHALATION) at 08:32

## 2021-02-10 ASSESSMENT — ACTIVITIES OF DAILY LIVING (ADL)
ADLS_ACUITY_SCORE: 17

## 2021-02-10 NOTE — PLAN OF CARE
Cognitive Concerns/ Orientation : orientedx4 calm and cooperative   BEHAVIOR & AGGRESSION TOOL COLOR: green  CIWA SCORE: n/a   ABNL VS/O2: VSS on RA: denies [place] ROSARIO  MOBILITY: up independently   PAIN MANAGMENT: Denies any pain  DIET: Clear liquids  BOWEL/BLADDER: Continent. Bowel sounds  hypoactive. Pt. Reports that she is passing gas and had a small BM  ABNL LAB/BG: , 116  DRAIN/DEVICES: PIV  SL  TELEMETRY RHYTHM: n/a  SKIN: WDL   TESTS/PROCEDURES:    DISCHARGE: Per MD note: Possible discharge today pending improved abdominal pain & resolution of ileus/early SBO  OTHER IMPORTANT INFO: Encouraged ambulation ad IS use

## 2021-02-10 NOTE — PLAN OF CARE
Given discharge instructions, belongings and discharged to home , vss and patient was comfortable at the time of discharge.

## 2021-02-10 NOTE — DISCHARGE SUMMARY
[facility]    Discharge Summary  Hospitalist    Date of Admission:  2/8/2021  Date of Discharge:  2/10/2021  Discharging Provider: Oscar Hassan MD    Discharge Diagnoses     Right-sided abdominal pain due to ileus   Chronic constipation  Mild asthma exacerbation  Basilar atelectasis  Morbid obesity with KRISTIN, on CPAP  Hypertension  Diabetes mellitus-2    Hospital Course:    Melissa Templeton is a 70 year old morbidly obese female with PMH significant for KRISTIN (on CPAP), asthma, hypertension, chronic constipation, diabetes,h/o lumbar spinal stenosis (s/p lumbar laminectomy) who presented to ED with right lower quadrant pain abdomen and also noted hypoxic in ED     Likely ileus versus early small bowel obstruction  -CT abdomen noted with dilated small bowel loops, likely ileus versus early small bowel obstruction; also noted diverticula but no diverticulitis  -normal LFTs, normal UA  -She was placed on bowel rest, IV fluids  -No reported nausea or vomiting  -Improved over the next 24 hours, repeat abdominal film did not show overt evidence of bowel obstruction.  Patient does have a history of chronic constipation, it was felt that  -She also received enema for constipation, day prior to discharge she had 2 or 3 bowel movements.  She was tolerating regular diet without any further pain.  Most likely she had an ileus due to her history of chronic constipation which resolved with conservative treatment.  -Moving forward I have advised her to stay on a bowel regimen including senna and MiraLAX.      Mild acute asthma exacerbation  Basilar atelectasis  Morbid obesity with KRISTIN, on CPAP  -In the ER she was found to be hypoxic with some wheezing  -she is not on any home oxygen  -chest x-ray as noted mostly unremarkable with increased basilar linear opacities suggestive of atelectasis versus developing infection; no clinical concerns for pneumonia.  Procalcitonin was normal.  Afebrile.  Normal  white cell count.  -started on IV Solu-Medrol,  -Much improved after steroids and nebulizers and aggressive incentive spirometry.  -Received 3 days of steroids total, she was back to her baseline.  Given her history of diabetes mellitus I discontinued steroid at the time of discharge.  -Continue PTA inhalers  -CPAP per home setting  -incentive spirometry     Hypertension  -continue PTA thiazide      Diabetes mellitus-2  -Resume prior to admission regimen    Oscar Hassan MD    Significant Results and Procedures   See below    Pending Results     Unresulted Labs Ordered in the Past 30 Days of this Admission     Date and Time Order Name Status Description    2/8/2021 0951 Blood culture Preliminary     2/8/2021 0951 Blood culture Preliminary           Code Status   Full Code       Primary Care Physician   Dimple Grgisby    Physical Exam   Temp: 98.6  F (37  C) Temp src: Oral BP: (!) 157/96 Pulse: 78   Resp: 18 SpO2: 94 % O2 Device: Nasal cannula      Constitutional: AAOX3, NAD  Respiratory: CTA B/L, slightly diminished at the bases, no active wheezing.  Normal WOB  Cardiovascular: RRR, No murmur  GI: Soft, Non- tender, BS- normoactive  Neuro: CN- grossly intact, Motor strength 5/5 on all 4 extremities.     Discharge Disposition   Discharged to home  Condition at discharge: Stable    Consultations This Hospital Stay   RESPIRATORY CARE IP CONSULT    Time Spent on this Encounter   IOscar MD, personally saw the patient today and spent less than or equal to 30 minutes discharging this patient.    Discharge Orders      Follow-up and recommended labs and tests    Follow up with primary care provider, Dimple Grigsby, within 7 days for hospital follow- up.     Activity    Your activity upon discharge: activity as tolerated     Full Code     Diet    Follow this diet upon discharge: Orders Placed This Encounter      Moderate Consistent CHO Diet       Discharge Medications   Current Discharge Medication List       START taking these medications    Details   polyethylene glycol (MIRALAX) 17 GM/Dose powder Take 17 g by mouth daily  Qty: 510 g    Associated Diagnoses: Constipation, unspecified constipation type      senna (SENOKOT) 8.6 MG tablet Take 1 tablet by mouth daily as needed for constipation  Qty: 30 tablet    Associated Diagnoses: Constipation, unspecified constipation type         CONTINUE these medications which have NOT CHANGED    Details   acetaminophen (TYLENOL) 500 MG tablet Take 1,000 mg by mouth 3 times daily as needed       albuterol (PROAIR HFA/PROVENTIL HFA/VENTOLIN HFA) 108 (90 BASE) MCG/ACT Inhaler Inhale 2 puffs into the lungs 2 times daily as needed for shortness of breath / dyspnea or wheezing       cholecalciferol (VITAMIN D3) 125 MCG (5000 UT) TABS tablet Take 5,000 Units by mouth daily      diclofenac (VOLTAREN) 1 % GEL topical gel Place onto the skin 2 times daily as needed for moderate pain       dulaglutide (TRULICITY) 0.75 MG/0.5ML pen Inject 0.75 mg Subcutaneous every 7 days Tuesdays.      FIBER ADULT GUMMIES PO Take 2 chew tab by mouth every morning (FIBERWELL)       fluticasone-vilanterol (BREO ELLIPTA) 100-25 MCG/INH inhaler Inhale 1 puff into the lungs daily       folic acid (FOLVITE) 400 MCG tablet Take 400 mcg by mouth daily      !! gabapentin (NEURONTIN) 300 MG capsule Take 600 mg by mouth 2 times daily 0900 + 1500      !! gabapentin (NEURONTIN) 300 MG capsule Take 900 mg by mouth At Bedtime      guaiFENesin (MUCINEX) 600 MG 12 hr tablet Take 1,200 mg by mouth daily      lidocaine (LIDODERM) 5 % Patch Place 1 patch onto the skin daily as needed for moderate pain      loratadine (CLARITIN) 10 MG tablet Take 10 mg by mouth daily       magnesium gluconate (MAGONATE) 250 MG tablet Take 250 mg by mouth every evening      Menthol, Topical Analgesic, (BIOFREEZE ROLL-ON) 4 % GEL Apply topically 2 times daily as needed (pain)      metFORMIN (GLUCOPHAGE-XR) 500 MG 24 hr tablet Take 1,500 mg by  mouth daily (with dinner)      methylPREDNISolone (MEDROL DOSEPAK) 4 MG tablet therapy pack Take by mouth as needed Follow Package Directions - URI - if needed.      mometasone (NASONEX) 50 MCG/ACT nasal spray Spray 2 sprays into both nostrils daily      Montelukast Sodium (SINGULAIR PO) Take 10 mg by mouth every evening       Multiple Vitamins-Minerals (PRESERVISION AREDS 2 PO) Take 1 capsule by mouth 2 times daily      Prenatal Vit-Fe Fumarate-FA (PRENATAL 19) 29-1 MG CHEW Take 1 chew tab by mouth every evening       pseudoePHEDrine (SUDOGEST 12 HOUR) 120 MG 12 hr tablet Take 120 mg by mouth At Bedtime       senna-docusate (SENOKOT-S/PERICOLACE) 8.6-50 MG tablet Take 2-3 tablets by mouth At Bedtime Patient alternates every with 2 to 3 tablets every other night.      triamterene-HCTZ (DYAZIDE) 37.5-25 MG capsule Take 1 capsule by mouth every morning      cephALEXin (KEFLEX) 500 MG capsule Take 1,000 mg by mouth 2 times daily as needed (before and after dental procedure)      !! order for DME Equipment being ordered: Walker Wheels () and Walker ()  Treatment Diagnosis: difficulty with gait  Qty: 1 each, Refills: 0    Associated Diagnoses: Status post total right knee replacement      !! ORDER FOR DME Equipment being ordered: rt short cam walker  Qty: 1 each, Refills: 0    Associated Diagnoses: Metatarsal fracture       !! - Potential duplicate medications found. Please discuss with provider.        Allergies   Allergies   Allergen Reactions     Magnesium Citrate Shortness Of Breath and Other (See Comments)     And CONFUSION     Erythromycin      nausea     Hmg-Coa-R Inhibitors Muscle Pain (Myalgia)     Nickel Rash     Data   Most Recent 3 CBC's:  Recent Labs   Lab Test 02/08/21  0901 01/14/20  0650 01/09/20  0808   WBC 7.4 6.7  --    HGB 14.4 11.6* 11.9   MCV 92 98  --     281  --       Most Recent 3 BMP's:  Recent Labs   Lab Test 02/09/21  0836 02/08/21  0901 01/14/20  0650    136 132*    POTASSIUM 4.2 3.4 3.3*   CHLORIDE 106 102 98   CO2 24 25 25   BUN 13 15 11   CR 0.52 0.54 0.47*   ANIONGAP 7 9 9   MATTI 8.7 8.9 8.6   * 133* 163*     Most Recent 2 LFT's:  Recent Labs   Lab Test 02/08/21  0901 09/23/19   AST 18 35   ALT 29 29   ALKPHOS 97  --    BILITOTAL 0.5  --      Most Recent INR's and Anticoagulation Dosing History:  Anticoagulation Dose History     There is no flowsheet data to display.        Most Recent 3 Troponin's:No lab results found.  Most Recent Cholesterol Panel:No lab results found.  Most Recent 6 Bacteria Isolates From Any Culture (See EPIC Reports for Culture Details):  Recent Labs   Lab Test 02/08/21  1026 02/08/21  0901   CULT No growth after 2 days No growth after 2 days     Most Recent TSH, T4 and A1c Labs:  Recent Labs   Lab Test 02/08/21  0901   A1C 6.6*       Results for orders placed or performed during the hospital encounter of 02/08/21   CT Abdomen Pelvis w Contrast    Narrative    CT ABDOMEN AND PELVIS WITH CONTRAST 2/8/2021 11:45 AM    CLINICAL HISTORY: Abdominal pain.    TECHNIQUE: CT scan of the abdomen and pelvis was performed following  injection of IV contrast. Multiplanar reformats were obtained. Dose  reduction techniques were used.    CONTRAST: 91mL Isovue-370    COMPARISON: None.    FINDINGS:   LOWER CHEST: Scattered scarring and/or linear atelectasis at both lung  bases.    HEPATOBILIARY: Prior cholecystectomy. No focal hepatic lesions are  identified.    PANCREAS: Unremarkable.    SPLEEN: Normal.    ADRENAL GLANDS: Normal.    KIDNEYS/BLADDER: Unremarkable. No hydronephrosis.    BOWEL: There are a few mildly prominent fluid and gas-filled loops of  distal small bowel in the right lower quadrant, with no convincing  transition point identified. Few scattered colonic diverticula are  noted. No convincing evidence for colitis or diverticulitis.  Unremarkable appendix.    PELVIC ORGANS: Prior hysterectomy.    LYMPH NODES: No enlarged lymph nodes are  identified in the abdomen or  pelvis.    VASCULATURE: Mild atherosclerotic aortoiliac calcification.    ADDITIONAL FINDINGS: None.    MUSCULOSKELETAL: Postoperative changes of posterior ezekiel and pedicle  screw fusion involving L3-S1.      Impression    IMPRESSION:   1.  There are a few mildly prominent fluid and gas-filled loops of  distal small bowel in the right lower quadrant, with no convincing  transition point identified. Findings could be related to ileus,  however a developing small bowel obstruction is not excluded. Clinical  correlation and follow-up are recommended.  2.  Few scattered colonic diverticula are noted, without evidence for  diverticulitis.  3.  Prior cholecystectomy and hysterectomy.    MILKA RIVERA MD   XR Chest Port 1 View    Narrative    XR CHEST PORT 1 VW 2/8/2021 1:00 PM    HISTORY: hypoxia    COMPARISON: 1/10/2020      Impression    IMPRESSION: Increased bibasilar linear opacities which could represent  linear atelectasis or developing infection. No pneumothorax or pleural  effusions. Normal heart size. Atherosclerotic aortic calcifications.  Right reverse TSA partly visualized. Left shoulder degenerative  changes.    IWONA DAVENPORT MD   XR Abdomen 1 View    Narrative    ABDOMEN ONE VIEW February 9, 2021 9:17 AM     HISTORY: Follow-up for small bowel obstruction.    COMPARISON: CT of the abdomen and pelvis performed 2/8/2021.      Impression    IMPRESSION: A few mildly prominent loops of small bowel are again  noted in the right midabdomen, but no convincing radiographic evidence  for small bowel obstruction. Unremarkable amount of stool throughout  the colon. Surgical clips RUQ. Postoperative changes of posterior ezekiel  and pedicle screw fusion at L3-S1.    MILKA RIVERA MD

## 2021-02-10 NOTE — PROGRESS NOTES
Discharge    Patient discharged to home with   Care plan notedone  Listed belongings gathered and returned to patient. Yes  Care Plan and Patient education resolved: yes  Prescriptions if needed, hard copies sent with patient  {NA  Home and hospital acquired medications returned to patient: yes  Medication Bin checked and emptied on discharge yes  Follow up appointment made for patient: no

## 2021-02-10 NOTE — PROGRESS NOTES
Pt brought her home cpap for overnight. RT checked on this shift. Machine was ready for use and pt stated no assistance needed.

## 2021-02-14 LAB
BACTERIA SPEC CULT: NO GROWTH
BACTERIA SPEC CULT: NO GROWTH
SPECIMEN SOURCE: NORMAL
SPECIMEN SOURCE: NORMAL

## 2021-02-27 DIAGNOSIS — Z11.59 ENCOUNTER FOR SCREENING FOR OTHER VIRAL DISEASES: ICD-10-CM

## 2021-03-15 ENCOUNTER — ANESTHESIA EVENT (OUTPATIENT)
Dept: SURGERY | Facility: CLINIC | Age: 71
End: 2021-03-15
Payer: MEDICARE

## 2021-03-16 ENCOUNTER — ANESTHESIA (OUTPATIENT)
Dept: SURGERY | Facility: CLINIC | Age: 71
End: 2021-03-16
Payer: MEDICARE

## 2021-03-16 ENCOUNTER — HOSPITAL ENCOUNTER (OUTPATIENT)
Facility: CLINIC | Age: 71
LOS: 1 days | Discharge: HOME | End: 2021-03-16
Attending: ORTHOPAEDIC SURGERY | Admitting: ORTHOPAEDIC SURGERY
Payer: MEDICARE

## 2021-03-16 VITALS
WEIGHT: 189 LBS | HEART RATE: 88 BPM | DIASTOLIC BLOOD PRESSURE: 73 MMHG | RESPIRATION RATE: 16 BRPM | BODY MASS INDEX: 38.1 KG/M2 | OXYGEN SATURATION: 98 % | HEIGHT: 59 IN | SYSTOLIC BLOOD PRESSURE: 124 MMHG | TEMPERATURE: 98.2 F

## 2021-03-16 PROBLEM — T79.A12A: Status: ACTIVE | Noted: 2021-03-16

## 2021-03-16 LAB
ABO + RH BLD: NORMAL
ABO + RH BLD: NORMAL
BLD GP AB SCN SERPL QL: NORMAL
BLOOD BANK CMNT PATIENT-IMP: NORMAL
CREAT SERPL-MCNC: 0.58 MG/DL (ref 0.52–1.04)
DEPRECATED CALCIDIOL+CALCIFEROL SERPL-MC: 62 UG/L (ref 20–75)
GFR SERPL CREATININE-BSD FRML MDRD: >90 ML/MIN/{1.73_M2}
GLUCOSE BLDC GLUCOMTR-MCNC: 112 MG/DL (ref 70–99)
GLUCOSE SERPL-MCNC: 173 MG/DL (ref 70–99)
LACTATE BLD-SCNC: 3.6 MMOL/L (ref 0.7–2)
LACTATE BLD-SCNC: 3.8 MMOL/L (ref 0.7–2)
POTASSIUM SERPL-SCNC: 3.3 MMOL/L (ref 3.4–5.3)
SPECIMEN EXP DATE BLD: NORMAL

## 2021-03-16 PROCEDURE — 250N000011 HC RX IP 250 OP 636: Performed by: NURSE ANESTHETIST, CERTIFIED REGISTERED

## 2021-03-16 PROCEDURE — 999N000054 HC STATISTIC EKG NON-CHARGEABLE

## 2021-03-16 PROCEDURE — 36415 COLL VENOUS BLD VENIPUNCTURE: CPT | Performed by: ORTHOPAEDIC SURGERY

## 2021-03-16 PROCEDURE — 86850 RBC ANTIBODY SCREEN: CPT | Performed by: ANESTHESIOLOGY

## 2021-03-16 PROCEDURE — 86900 BLOOD TYPING SEROLOGIC ABO: CPT | Performed by: ANESTHESIOLOGY

## 2021-03-16 PROCEDURE — 86901 BLOOD TYPING SEROLOGIC RH(D): CPT | Performed by: ANESTHESIOLOGY

## 2021-03-16 PROCEDURE — 360N000078 HC SURGERY LEVEL 5, PER MIN: Performed by: ORTHOPAEDIC SURGERY

## 2021-03-16 PROCEDURE — 999N000141 HC STATISTIC PRE-PROCEDURE NURSING ASSESSMENT: Performed by: ORTHOPAEDIC SURGERY

## 2021-03-16 PROCEDURE — 84132 ASSAY OF SERUM POTASSIUM: CPT | Performed by: ANESTHESIOLOGY

## 2021-03-16 PROCEDURE — 93010 ELECTROCARDIOGRAM REPORT: CPT | Performed by: INTERNAL MEDICINE

## 2021-03-16 PROCEDURE — 370N000017 HC ANESTHESIA TECHNICAL FEE, PER MIN: Performed by: ORTHOPAEDIC SURGERY

## 2021-03-16 PROCEDURE — 258N000003 HC RX IP 258 OP 636

## 2021-03-16 PROCEDURE — 93005 ELECTROCARDIOGRAM TRACING: CPT

## 2021-03-16 PROCEDURE — 250N000013 HC RX MED GY IP 250 OP 250 PS 637: Performed by: ORTHOPAEDIC SURGERY

## 2021-03-16 PROCEDURE — 250N000011 HC RX IP 250 OP 636: Performed by: ORTHOPAEDIC SURGERY

## 2021-03-16 PROCEDURE — 82306 VITAMIN D 25 HYDROXY: CPT | Performed by: ANESTHESIOLOGY

## 2021-03-16 PROCEDURE — 250N000011 HC RX IP 250 OP 636

## 2021-03-16 PROCEDURE — 36415 COLL VENOUS BLD VENIPUNCTURE: CPT | Performed by: ANESTHESIOLOGY

## 2021-03-16 PROCEDURE — 258N000003 HC RX IP 258 OP 636: Performed by: ANESTHESIOLOGY

## 2021-03-16 PROCEDURE — 250N000009 HC RX 250

## 2021-03-16 PROCEDURE — 83605 ASSAY OF LACTIC ACID: CPT | Performed by: ORTHOPAEDIC SURGERY

## 2021-03-16 PROCEDURE — 250N000009 HC RX 250: Performed by: ORTHOPAEDIC SURGERY

## 2021-03-16 PROCEDURE — 82947 ASSAY GLUCOSE BLOOD QUANT: CPT | Performed by: ANESTHESIOLOGY

## 2021-03-16 PROCEDURE — 82565 ASSAY OF CREATININE: CPT | Performed by: ORTHOPAEDIC SURGERY

## 2021-03-16 PROCEDURE — 82962 GLUCOSE BLOOD TEST: CPT

## 2021-03-16 RX ORDER — GABAPENTIN 100 MG/1
100 CAPSULE ORAL
Status: DISCONTINUED | OUTPATIENT
Start: 2021-03-16 | End: 2021-03-16 | Stop reason: HOSPADM

## 2021-03-16 RX ORDER — NALOXONE HYDROCHLORIDE 0.4 MG/ML
0.4 INJECTION, SOLUTION INTRAMUSCULAR; INTRAVENOUS; SUBCUTANEOUS
Status: DISCONTINUED | OUTPATIENT
Start: 2021-03-16 | End: 2021-03-16

## 2021-03-16 RX ORDER — ASPIRIN 81 MG/1
81 TABLET ORAL EVERY EVENING
Status: ON HOLD | COMMUNITY
End: 2022-11-12

## 2021-03-16 RX ORDER — ONDANSETRON 2 MG/ML
INJECTION INTRAMUSCULAR; INTRAVENOUS PRN
Status: DISCONTINUED | OUTPATIENT
Start: 2021-03-16 | End: 2021-03-16

## 2021-03-16 RX ORDER — LANOLIN ALCOHOL/MO/W.PET/CERES
400 CREAM (GRAM) TOPICAL DAILY
Status: DISCONTINUED | OUTPATIENT
Start: 2021-03-17 | End: 2021-03-16 | Stop reason: HOSPADM

## 2021-03-16 RX ORDER — NALOXONE HYDROCHLORIDE 0.4 MG/ML
0.2 INJECTION, SOLUTION INTRAMUSCULAR; INTRAVENOUS; SUBCUTANEOUS
Status: DISCONTINUED | OUTPATIENT
Start: 2021-03-16 | End: 2021-03-16

## 2021-03-16 RX ORDER — SODIUM CHLORIDE, SODIUM LACTATE, POTASSIUM CHLORIDE, CALCIUM CHLORIDE 600; 310; 30; 20 MG/100ML; MG/100ML; MG/100ML; MG/100ML
INJECTION, SOLUTION INTRAVENOUS CONTINUOUS
Status: DISCONTINUED | OUTPATIENT
Start: 2021-03-16 | End: 2021-03-16 | Stop reason: HOSPADM

## 2021-03-16 RX ORDER — TRIAMTERENE/HYDROCHLOROTHIAZID 37.5-25 MG
1 TABLET ORAL DAILY
Status: DISCONTINUED | OUTPATIENT
Start: 2021-03-17 | End: 2021-03-16 | Stop reason: HOSPADM

## 2021-03-16 RX ORDER — FENTANYL CITRATE 50 UG/ML
25-50 INJECTION, SOLUTION INTRAMUSCULAR; INTRAVENOUS
Status: DISCONTINUED | OUTPATIENT
Start: 2021-03-16 | End: 2021-03-16 | Stop reason: HOSPADM

## 2021-03-16 RX ORDER — GABAPENTIN 300 MG/1
900 CAPSULE ORAL 3 TIMES DAILY
Status: DISCONTINUED | OUTPATIENT
Start: 2021-03-16 | End: 2021-03-16 | Stop reason: HOSPADM

## 2021-03-16 RX ORDER — ANALGESIC BALM 1.74; 4.06 G/29G; G/29G
OINTMENT TOPICAL 2 TIMES DAILY PRN
Status: DISCONTINUED | OUTPATIENT
Start: 2021-03-16 | End: 2021-03-16 | Stop reason: HOSPADM

## 2021-03-16 RX ORDER — LIDOCAINE HYDROCHLORIDE 20 MG/ML
INJECTION, SOLUTION INFILTRATION; PERINEURAL PRN
Status: DISCONTINUED | OUTPATIENT
Start: 2021-03-16 | End: 2021-03-16

## 2021-03-16 RX ORDER — PSEUDOEPHEDRINE HCL 120 MG/1
120 TABLET, FILM COATED, EXTENDED RELEASE ORAL AT BEDTIME
Status: DISCONTINUED | OUTPATIENT
Start: 2021-03-16 | End: 2021-03-16 | Stop reason: HOSPADM

## 2021-03-16 RX ORDER — FENTANYL CITRATE 50 UG/ML
INJECTION, SOLUTION INTRAMUSCULAR; INTRAVENOUS PRN
Status: DISCONTINUED | OUTPATIENT
Start: 2021-03-16 | End: 2021-03-16

## 2021-03-16 RX ORDER — METFORMIN HCL 500 MG
1000 TABLET, EXTENDED RELEASE 24 HR ORAL 2 TIMES DAILY WITH MEALS
Status: DISCONTINUED | OUTPATIENT
Start: 2021-03-16 | End: 2021-03-16 | Stop reason: HOSPADM

## 2021-03-16 RX ORDER — CEFAZOLIN SODIUM 2 G/100ML
2 INJECTION, SOLUTION INTRAVENOUS SEE ADMIN INSTRUCTIONS
Status: DISCONTINUED | OUTPATIENT
Start: 2021-03-16 | End: 2021-03-16 | Stop reason: HOSPADM

## 2021-03-16 RX ORDER — MELOXICAM 15 MG/1
15 TABLET ORAL EVERY MORNING
Status: ON HOLD | COMMUNITY
End: 2021-03-24

## 2021-03-16 RX ORDER — ALBUTEROL SULFATE 90 UG/1
2 AEROSOL, METERED RESPIRATORY (INHALATION) 2 TIMES DAILY PRN
Status: DISCONTINUED | OUTPATIENT
Start: 2021-03-16 | End: 2021-03-16 | Stop reason: HOSPADM

## 2021-03-16 RX ORDER — PROPOFOL 10 MG/ML
INJECTION, EMULSION INTRAVENOUS CONTINUOUS PRN
Status: DISCONTINUED | OUTPATIENT
Start: 2021-03-16 | End: 2021-03-16

## 2021-03-16 RX ORDER — PROPOFOL 10 MG/ML
INJECTION, EMULSION INTRAVENOUS PRN
Status: DISCONTINUED | OUTPATIENT
Start: 2021-03-16 | End: 2021-03-16

## 2021-03-16 RX ORDER — CALCIUM POLYCARBOPHIL 625 MG 625 MG/1
625 TABLET ORAL 2 TIMES DAILY
Status: DISCONTINUED | OUTPATIENT
Start: 2021-03-16 | End: 2021-03-16 | Stop reason: HOSPADM

## 2021-03-16 RX ORDER — ASPIRIN 81 MG/1
81 TABLET ORAL EVERY EVENING
Status: DISCONTINUED | OUTPATIENT
Start: 2021-03-16 | End: 2021-03-16 | Stop reason: HOSPADM

## 2021-03-16 RX ORDER — MAGNESIUM AMINO ACID CHELATE 27 MG
250 TABLET ORAL EVERY EVENING
Status: DISCONTINUED | OUTPATIENT
Start: 2021-03-16 | End: 2021-03-16 | Stop reason: HOSPADM

## 2021-03-16 RX ORDER — MONTELUKAST SODIUM 10 MG/1
10 TABLET ORAL EVERY EVENING
Status: DISCONTINUED | OUTPATIENT
Start: 2021-03-16 | End: 2021-03-16 | Stop reason: HOSPADM

## 2021-03-16 RX ORDER — CEPHALEXIN 500 MG/1
1000 CAPSULE ORAL 2 TIMES DAILY PRN
Status: DISCONTINUED | OUTPATIENT
Start: 2021-03-16 | End: 2021-03-16

## 2021-03-16 RX ORDER — ACETAMINOPHEN 500 MG
1000 TABLET ORAL 3 TIMES DAILY PRN
Status: DISCONTINUED | OUTPATIENT
Start: 2021-03-16 | End: 2021-03-16 | Stop reason: HOSPADM

## 2021-03-16 RX ORDER — HYDROMORPHONE HYDROCHLORIDE 1 MG/ML
.3-.5 INJECTION, SOLUTION INTRAMUSCULAR; INTRAVENOUS; SUBCUTANEOUS EVERY 5 MIN PRN
Status: DISCONTINUED | OUTPATIENT
Start: 2021-03-16 | End: 2021-03-16 | Stop reason: HOSPADM

## 2021-03-16 RX ORDER — LABETALOL HYDROCHLORIDE 5 MG/ML
10 INJECTION, SOLUTION INTRAVENOUS
Status: DISCONTINUED | OUTPATIENT
Start: 2021-03-16 | End: 2021-03-16 | Stop reason: HOSPADM

## 2021-03-16 RX ORDER — ONDANSETRON 2 MG/ML
4 INJECTION INTRAMUSCULAR; INTRAVENOUS EVERY 30 MIN PRN
Status: DISCONTINUED | OUTPATIENT
Start: 2021-03-16 | End: 2021-03-16 | Stop reason: HOSPADM

## 2021-03-16 RX ORDER — AMOXICILLIN 250 MG
2-3 CAPSULE ORAL AT BEDTIME
Status: DISCONTINUED | OUTPATIENT
Start: 2021-03-16 | End: 2021-03-16 | Stop reason: HOSPADM

## 2021-03-16 RX ORDER — TRANEXAMIC ACID 10 MG/ML
1 INJECTION, SOLUTION INTRAVENOUS CONTINUOUS
Status: DISCONTINUED | OUTPATIENT
Start: 2021-03-16 | End: 2021-03-16 | Stop reason: HOSPADM

## 2021-03-16 RX ORDER — TRANEXAMIC ACID 10 MG/ML
1000 INJECTION, SOLUTION INTRAVENOUS ONCE
Status: COMPLETED | OUTPATIENT
Start: 2021-03-16 | End: 2021-03-16

## 2021-03-16 RX ORDER — FLUTICASONE PROPIONATE 50 MCG
2 SPRAY, SUSPENSION (ML) NASAL DAILY
Status: DISCONTINUED | OUTPATIENT
Start: 2021-03-17 | End: 2021-03-16 | Stop reason: HOSPADM

## 2021-03-16 RX ORDER — CEFAZOLIN SODIUM 2 G/100ML
2 INJECTION, SOLUTION INTRAVENOUS
Status: COMPLETED | OUTPATIENT
Start: 2021-03-16 | End: 2021-03-16

## 2021-03-16 RX ORDER — CELECOXIB 200 MG/1
200 CAPSULE ORAL 2 TIMES DAILY
Status: DISCONTINUED | OUTPATIENT
Start: 2021-03-16 | End: 2021-03-16 | Stop reason: HOSPADM

## 2021-03-16 RX ORDER — LIDOCAINE 4 G/G
1 PATCH TOPICAL DAILY PRN
Status: DISCONTINUED | OUTPATIENT
Start: 2021-03-16 | End: 2021-03-16 | Stop reason: HOSPADM

## 2021-03-16 RX ORDER — TRIAMTERENE AND HYDROCHLOROTHIAZIDE 37.5; 25 MG/1; MG/1
1 CAPSULE ORAL EVERY MORNING
Status: DISCONTINUED | OUTPATIENT
Start: 2021-03-17 | End: 2021-03-16

## 2021-03-16 RX ORDER — LORATADINE 10 MG/1
10 TABLET ORAL DAILY
Status: DISCONTINUED | OUTPATIENT
Start: 2021-03-17 | End: 2021-03-16 | Stop reason: HOSPADM

## 2021-03-16 RX ORDER — ACETAMINOPHEN 325 MG/1
975 TABLET ORAL ONCE
Status: COMPLETED | OUTPATIENT
Start: 2021-03-16 | End: 2021-03-16

## 2021-03-16 RX ORDER — GUAIFENESIN 600 MG/1
1200 TABLET, EXTENDED RELEASE ORAL DAILY
Status: DISCONTINUED | OUTPATIENT
Start: 2021-03-16 | End: 2021-03-16 | Stop reason: HOSPADM

## 2021-03-16 RX ORDER — REMIFENTANIL HYDROCHLORIDE 1 MG/ML
INJECTION, POWDER, LYOPHILIZED, FOR SOLUTION INTRAVENOUS CONTINUOUS PRN
Status: DISCONTINUED | OUTPATIENT
Start: 2021-03-16 | End: 2021-03-16

## 2021-03-16 RX ORDER — SODIUM CHLORIDE, SODIUM LACTATE, POTASSIUM CHLORIDE, CALCIUM CHLORIDE 600; 310; 30; 20 MG/100ML; MG/100ML; MG/100ML; MG/100ML
INJECTION, SOLUTION INTRAVENOUS CONTINUOUS PRN
Status: DISCONTINUED | OUTPATIENT
Start: 2021-03-16 | End: 2021-03-16

## 2021-03-16 RX ORDER — ONDANSETRON 4 MG/1
4 TABLET, ORALLY DISINTEGRATING ORAL EVERY 30 MIN PRN
Status: DISCONTINUED | OUTPATIENT
Start: 2021-03-16 | End: 2021-03-16 | Stop reason: HOSPADM

## 2021-03-16 RX ORDER — LIDOCAINE 40 MG/G
CREAM TOPICAL
Status: DISCONTINUED | OUTPATIENT
Start: 2021-03-16 | End: 2021-03-16 | Stop reason: HOSPADM

## 2021-03-16 RX ORDER — VIT C/E/ZN/COPPR/LUTEIN/ZEAXAN 60 MG-6 MG
1 CAPSULE ORAL 2 TIMES DAILY
Status: DISCONTINUED | OUTPATIENT
Start: 2021-03-16 | End: 2021-03-16 | Stop reason: HOSPADM

## 2021-03-16 RX ORDER — PRENATAL VIT/IRON FUM/FOLIC AC 27MG-0.8MG
1 TABLET ORAL EVERY EVENING
Status: DISCONTINUED | OUTPATIENT
Start: 2021-03-16 | End: 2021-03-16 | Stop reason: HOSPADM

## 2021-03-16 RX ADMIN — GUAIFENESIN 1200 MG: 600 TABLET, EXTENDED RELEASE ORAL at 14:52

## 2021-03-16 RX ADMIN — SUCCINYLCHOLINE CHLORIDE 100 MG: 20 INJECTION, SOLUTION INTRAMUSCULAR; INTRAVENOUS; PARENTERAL at 07:56

## 2021-03-16 RX ADMIN — PHENYLEPHRINE HYDROCHLORIDE 100 MCG: 10 INJECTION INTRAVENOUS at 08:06

## 2021-03-16 RX ADMIN — TRANEXAMIC ACID 1 G: 10 INJECTION, SOLUTION INTRAVENOUS at 08:08

## 2021-03-16 RX ADMIN — SODIUM CHLORIDE, POTASSIUM CHLORIDE, SODIUM LACTATE AND CALCIUM CHLORIDE: 600; 310; 30; 20 INJECTION, SOLUTION INTRAVENOUS at 06:27

## 2021-03-16 RX ADMIN — REMIFENTANIL HYDROCHLORIDE 0.07 MCG/KG/MIN: 1 INJECTION, POWDER, LYOPHILIZED, FOR SOLUTION INTRAVENOUS at 08:06

## 2021-03-16 RX ADMIN — PROPOFOL 160 MG: 10 INJECTION, EMULSION INTRAVENOUS at 07:56

## 2021-03-16 RX ADMIN — ACETAMINOPHEN 975 MG: 325 TABLET, FILM COATED ORAL at 06:07

## 2021-03-16 RX ADMIN — PROPOFOL 150 MCG/KG/MIN: 10 INJECTION, EMULSION INTRAVENOUS at 08:06

## 2021-03-16 RX ADMIN — PHENYLEPHRINE HYDROCHLORIDE 0.3 MCG/KG/MIN: 10 INJECTION INTRAVENOUS at 08:07

## 2021-03-16 RX ADMIN — FENTANYL CITRATE 100 MCG: 50 INJECTION, SOLUTION INTRAMUSCULAR; INTRAVENOUS at 07:56

## 2021-03-16 RX ADMIN — MIDAZOLAM 1 MG: 1 INJECTION INTRAMUSCULAR; INTRAVENOUS at 07:50

## 2021-03-16 RX ADMIN — CEFAZOLIN SODIUM 2 G: 2 INJECTION, SOLUTION INTRAVENOUS at 08:08

## 2021-03-16 RX ADMIN — ONDANSETRON 4 MG: 2 INJECTION INTRAMUSCULAR; INTRAVENOUS at 09:46

## 2021-03-16 RX ADMIN — GABAPENTIN 900 MG: 300 CAPSULE ORAL at 14:52

## 2021-03-16 RX ADMIN — PROPOFOL 80 MG: 10 INJECTION, EMULSION INTRAVENOUS at 08:14

## 2021-03-16 RX ADMIN — SODIUM CHLORIDE, SODIUM LACTATE, POTASSIUM CHLORIDE, CALCIUM CHLORIDE: 600; 310; 30; 20 INJECTION, SOLUTION INTRAVENOUS at 08:01

## 2021-03-16 RX ADMIN — LIDOCAINE HYDROCHLORIDE 100 MG: 20 INJECTION, SOLUTION INFILTRATION; PERINEURAL at 07:56

## 2021-03-16 ASSESSMENT — ACTIVITIES OF DAILY LIVING (ADL)
ADLS_ACUITY_SCORE: 18
CONCENTRATING,_REMEMBERING_OR_MAKING_DECISIONS_DIFFICULTY: NO

## 2021-03-16 ASSESSMENT — MIFFLIN-ST. JEOR: SCORE: 1282.93

## 2021-03-16 NOTE — PROGRESS NOTES
Dr. Clark updated as to pt's condition: LUE capillary refill less than 3 seconds, no numbness/tingling, no pain, pulse ox 94-97%, pt able to wiggle/move fingers/hand and make fist.  Dr. Clark to be updated immediately [phone] if any change in condition.

## 2021-03-16 NOTE — CONSULTS
This was an intra operative request from Dr. Durand in anesthesia and Dr. Collier in Orthopedic spine service.  Patient is a 70 year old female who was planning on undergoing cervical spine surgery. She had been placed under GA.   An IV in her left hand infiltrated and by report had resulted in infiltration of Neosynephrine and Propofol.  When I examined the patient the left hand was cool and pale. I could not feel a radial pulse. The soft tissue on the volar aspect of the forearm were firm.   I was concerned about developing compartment syndrome and advised fasciotomy and release of the carpal tunnel.

## 2021-03-16 NOTE — PROGRESS NOTES
POSTOP CHECK  History:   Minimal c/o.   Has only mild pain in left forearm. Taking only tylenol.  Vitals:   B/P: 124/73, T: 98.2, P: 88, R: 16    Intake/Output Summary (Last 24 hours) at 3/16/2021 1445  Last data filed at 3/16/2021 1006  Gross per 24 hour   Intake 1000 ml   Output --   Net 1000 ml          Lab Results   Component Value Date     02/09/2021     02/08/2021     01/14/2020    Lab Results   Component Value Date    CHLORIDE 106 02/09/2021    CHLORIDE 102 02/08/2021    CHLORIDE 98 01/14/2020    Lab Results   Component Value Date    BUN 13 02/09/2021    BUN 15 02/08/2021    BUN 11 01/14/2020      Lab Results   Component Value Date    POTASSIUM 3.3 03/16/2021    POTASSIUM 4.2 02/09/2021    POTASSIUM 3.4 02/08/2021    Lab Results   Component Value Date    CO2 24 02/09/2021    CO2 25 02/08/2021    CO2 25 01/14/2020    Lab Results   Component Value Date    CR 0.52 02/09/2021    CR 0.54 02/08/2021    CR 0.47 01/14/2020        Lab Results   Component Value Date    WBC 7.4 02/08/2021    WBC 6.7 01/14/2020    WBC 6.1 11/17/2008    HGB 14.4 02/08/2021    HGB 11.6 (L) 01/14/2020    HGB 11.9 01/09/2020    HCT 42.6 02/08/2021    HCT 35.2 01/14/2020    HCT 38.7 11/17/2008    MCV 92 02/08/2021    MCV 98 01/14/2020    MCV 92 11/17/2008     02/08/2021     01/14/2020     11/17/2008        Results for orders placed or performed during the hospital encounter of 03/16/21 (from the past 24 hour(s))   Glucose   Result Value Ref Range    Glucose 173 (H) 70 - 99 mg/dL   Potassium   Result Value Ref Range    Potassium 3.3 (L) 3.4 - 5.3 mmol/L   ABO/Rh type and screen   Result Value Ref Range    ABO A     RH(D) Pos     Antibody Screen Neg     Test Valid Only At [facility]        Specimen Expires 03/19/2021    Vitamin D Deficiency   Result Value Ref Range    Vitamin D Deficiency screening 62 20 - 75 ug/L   EKG 12-lead, tracing only   Result Value Ref Range    Interpretation ECG  Click View Image link to view waveform and result    Lactic acid level STAT   Result Value Ref Range    Lactate for Sepsis Protocol 3.6 (H) 0.7 - 2.0 mmol/L     Left hand and forearm: minimal swelling, palpable radial pulse and excellent capillary refill, pulse oximeter 98% on long finger and    thumb.    Neuro:   Motor: 4/5 left intrisic muscles as preop o/w 5/5 in left hand including FPL and FDL to fingers.  Sensation: intact      A/P:   Stable Post op check.  No sign of compartment syndrome, temporary ischemia in left hand likely d/t infiltration of juanjose synephrine that resolved.   Probable discharge to home this afternoon, will leave decision to Dr. Clark.

## 2021-03-16 NOTE — PROVIDER NOTIFICATION
Critical lactic of 3.6. Called Surgeon Nando for update.      MD Brantley notified as well. Order for redraw and follow patient closely.      Surgeon Nando aware of elevated lactic-no new orders at this time

## 2021-03-16 NOTE — BRIEF OP NOTE
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     Procedure: Placement and removal of Yao Head deleon. MEP, SSEP and EMG monitoring.   Surgeon: Surgeon(s) and Role:     * Regan Collier MD - Primary     * Darlene Torres PA-C - Assisting  Anesthesia: General   Estimated blood loss: None  Drains: None  Specimens: * No specimens in log *  Findings:   ischemia of the left hand resolved after removal of IV. .  Complications: Ischemia of the left hand resolved after IV removal. .  Implants: * No implants in log *

## 2021-03-16 NOTE — PROGRESS NOTES
Dr. Durand approved pt to Tx to station 55.  LUE unchanged, WDL (see previous note).  A&Ox4, 2L NC--lungs clear, Tele: NSR.  Dr. Collier to see pt this afternoon, possible discharge later today.  Denies pain.   Harley updated.

## 2021-03-16 NOTE — PROGRESS NOTES
After I finished my spine procedure I went back to Dr. Collier's OR and noted significant improvement. The hand was warm and pink and I was easily able to palpate the radial pulse. We will continue to monitor.

## 2021-03-16 NOTE — ANESTHESIA POSTPROCEDURE EVALUATION
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    Anesthesia Type:  General    Note:  Disposition: Outpatient   Postop Pain Control: Uneventful            Sign Out: Well controlled pain   PONV: No   Neuro/Psych: Uneventful            Sign Out: Acceptable/Baseline neuro status   Airway/Respiratory: Uneventful            Sign Out: Acceptable/Baseline resp. status   CV/Hemodynamics: Uneventful            Sign Out: Acceptable CV status   Other NRE: NONE   DID A NON-ROUTINE EVENT OCCUR? YES    Event details/Postop Comments:  IV infiltration.  Hand and Vascular surgeon consulted.  Decided not to do fasciotomies.  Both hand and vascular teams will continue to monitor.                 Last vitals:  Vitals:    03/16/21 1120 03/16/21 1130 03/16/21 1148   BP: 138/86 123/88    Pulse: 88 87    Resp: 24 11    Temp:      SpO2: 94% 95% 94%       Last vitals prior to Anesthesia Care Transfer:  CRNA VITALS  3/16/2021 0938 - 3/16/2021 1038      3/16/2021             Pulse:  90    SpO2:  95 %    Resp Rate (set):  10          Electronically Signed By: Ez Durand MD  March 16, 2021  12:03 PM

## 2021-03-16 NOTE — ANESTHESIA PREPROCEDURE EVALUATION
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]   Procedure : Procedure(s):  CERVICAL 7 CORPECTOMY, CERVICAL 6-THORACIC 1 ANTERIOR FUSION WITH INTERVETEBRAL PROSTHESIS AND INSTRUMENTATION, LOCAL AUTOGRAFT     Past Medical History:   Diagnosis Date     Allergic rhinitis      Allergic rhinitis, cause unspecified      Atypical chest pain      Chronic constipation      Fluid overload      Health maintenance alteration      Hyperlipidemia      Hypoglycemia, unspecified      Obese      Osteoarthrosis, unspecified whether generalized or localized, unspecified site      Sleep apnea     cpap     Uncomplicated asthma      Unspecified cataract      Vitamin D deficiency       Past Surgical History:   Procedure Laterality Date     APPENDECTOMY       ARTHROPLASTY KNEE Right 2018    Procedure: ARTHROPLASTY KNEE;  RIGHT TOTAL KNEE ARTHROPLASTY    EBL: 5mL;  Surgeon: Ambar Wilson MD;  Location:  OR     ARTHROPLASTY SHOULDER Right 2019    Procedure: RIGHT TOTAL SHOULDER ARTHROPLASTY REVERSE (TORNIER)^;  Surgeon: Ambar Wilson MD;  Location:  OR     BACK SURGERY       CHOLECYSTECTOMY       COLONOSCOPY       EYE SURGERY       GYN SURGERY      MARYJO BSO     LAMINECTOMY, FUSION LUMBAR THREE+ LEVEL, COMBINED N/A 2020    Procedure: L3,L4,L5 LAMINECTOMIES; POSTERIOR SPINAL FUSION; POSTERIOR LUMBAR INTERBODY FUSION L3-S1 USING INTERVERTEBRAL PROSTHESIS; LOCAL AUTOGRAFT AND INSTRUMENTATION.;  Surgeon: Regan Collier MD;  Location:  OR     ORTHOPEDIC SURGERY      dulce. rotator cuffs     ORTHOPEDIC SURGERY      lumbar laminectomy     ZZC NONSPECIFIC PROCEDURE      Hysterectomy fibroids - bilat S&O     ZZC NONSPECIFIC PROCEDURE  4-98    Discectomy L5-S1     ZZC NONSPECIFIC PROCEDURE      Cosmetic below eyes      Allergies   Allergen Reactions      Magnesium Citrate Shortness Of Breath and Other (See Comments)     And CONFUSION     Erythromycin      nausea     Hmg-Coa-R Inhibitors Muscle Pain (Myalgia)     Nickel Rash      Social History     Tobacco Use     Smoking status: Former Smoker     Years: 10.00     Quit date:      Years since quittin.2     Smokeless tobacco: Never Used   Substance Use Topics     Alcohol use: Yes     Comment: OCCASIONALLY--2/month      Wt Readings from Last 1 Encounters:   21 85.7 kg (189 lb)        Anesthesia Evaluation            ROS/MED HX  ENT/Pulmonary:     (+) sleep apnea, allergic rhinitis, asthma     Neurologic:     (+) peripheral neuropathy, - B/L Legs and Right Hand.     Cardiovascular:     (+) Dyslipidemia -----    METS/Exercise Tolerance:     Hematologic:       Musculoskeletal:   (+) arthritis,     GI/Hepatic:    (-) GERD   Renal/Genitourinary:       Endo:     (+) type II DM, Obesity,     Psychiatric/Substance Use:       Infectious Disease:       Malignancy:       Other:               OUTSIDE LABS:  CBC:   Lab Results   Component Value Date    WBC 7.4 2021    WBC 6.7 2020    HGB 14.4 2021    HGB 11.6 (L) 2020    HCT 42.6 2021    HCT 35.2 2020     2021     2020     BMP:   Lab Results   Component Value Date     2021     2021    POTASSIUM 4.2 2021    POTASSIUM 3.4 2021    CHLORIDE 106 2021    CHLORIDE 102 2021    CO2 24 2021    CO2 25 2021    BUN 13 2021    BUN 15 2021    CR 0.52 2021    CR 0.54 2021     (H) 2021     (H) 2021     COAGS: No results found for: PTT, INR, FIBR  POC:   Lab Results   Component Value Date     (H) 02/10/2021     HEPATIC:   Lab Results   Component Value Date    ALBUMIN 3.7 2021    PROTTOTAL 7.4 2021    ALT 29 2021    AST 18 2021    ALKPHOS 97 2021    BILITOTAL 0.5 2021      OTHER:   Lab Results   Component Value Date    LACT 2.0 02/08/2021    A1C 6.6 (H) 02/08/2021    MATTI 8.7 02/09/2021    LIPASE 195 02/08/2021       Anesthesia Plan    ASA Status:  2      Anesthesia Type: General.     - Airway: ETT   Induction: Intravenous.   Maintenance: Inhalation.   Techniques and Equipment:     - Airway: Video-Laryngoscope         Consents         - Extended Intubation/Ventilatory Support Discussed: No.      - Patient is DNR/DNI Status: No         Postoperative Care       PONV prophylaxis: Ondansetron (or other 5HT-3), Dexamethasone or Solumedrol     Comments:                Ez Durand MD

## 2021-03-16 NOTE — PROGRESS NOTES
Medication History Completed by Medication Scribe  Admission medication history interview status for the 3/16/2021  admission is complete. See EPIC admission navigator for prior to admission medications     Medication history sources: Patient, Surescripts and H&P  Medication history source reliability: Good  Adherence assessment: N/A Not Observed    Significant changes made to the medication list:  Patient taking meds differently than prescribed; See PTA entries for: Metformin      Additional medication history information:   Pt states Trulicity has been on hold since February 9th (she states she held this due to it's side effects) She plans to restart this at some point after surgery. I have left this on her PTA med list     Medication reconciliation completed by provider prior to medication history? No    Time spent in this activity: 35 minutes      Prior to Admission medications    Medication Sig Last Dose Taking? Auth Provider   acetaminophen (TYLENOL) 500 MG tablet Take 1,000 mg by mouth 3 times daily as needed  3/15/2021 at PM Yes Reported, Patient   albuterol (PROAIR HFA/PROVENTIL HFA/VENTOLIN HFA) 108 (90 BASE) MCG/ACT Inhaler Inhale 2 puffs into the lungs 2 times daily as needed for shortness of breath / dyspnea or wheezing  3/16/2021 at PRN Yes Reported, Patient   ALPHA LIPOIC ACID PO Take 1 capsule by mouth daily 3/15/2021 Yes Reported, Patient   aspirin 81 MG EC tablet Take 81 mg by mouth every evening 2 WEEKS AGO Yes Reported, Patient   cephALEXin (KEFLEX) 500 MG capsule Take 1,000 mg by mouth 2 times daily as needed (before and after dental procedure) MORE THAN 1 MONTH at PRN Yes Reported, Patient   cholecalciferol (VITAMIN D3) 125 MCG (5000 UT) TABS tablet Take 5,000 Units by mouth daily 3/16/2021 at 0500 Yes Reported, Patient   diclofenac (VOLTAREN) 1 % GEL topical gel Place onto the skin 2 times daily as needed for moderate pain  MORE THAN 1 WEEK at PRN Yes Reported, Patient   dulaglutide  (TRULICITY) 0.75 MG/0.5ML pen Inject 0.75 mg Subcutaneous every 7 days Tuesdays. 2/9/2021 Yes Unknown, Entered By History   FIBER ADULT GUMMIES PO Take 2 chew tab by mouth every morning (FIBERWELL)  3/14/2021 Yes Reported, Patient   fluticasone-vilanterol (BREO ELLIPTA) 100-25 MCG/INH inhaler Inhale 1 puff into the lungs daily  3/16/2021 at AM Yes Reported, Patient   folic acid (FOLVITE) 400 MCG tablet Take 400 mcg by mouth daily 3/16/2021 at 0500 Yes Reported, Patient   gabapentin (NEURONTIN) 300 MG capsule Take 900 mg by mouth 3 times daily (3 x 300mg) 3/16/2021 at 0500 Yes Unknown, Entered By History   Glucosamine HCl (GLUCOSAMINE PO) Take 1,500 mg by mouth every evening 2 WEEKS AGO Yes Reported, Patient   guaiFENesin (MUCINEX) 600 MG 12 hr tablet Take 1,200 mg by mouth daily 3/15/2021 at AM Yes Unknown, Entered By History   lidocaine (LIDODERM) 5 % Patch Place 1 patch onto the skin daily as needed for moderate pain Past Week at PRN Yes Reported, Patient   loratadine (CLARITIN) 10 MG tablet Take 10 mg by mouth daily  3/16/2021 at 0500 Yes Reported, Patient   magnesium gluconate (MAGONATE) 250 MG tablet Take 250 mg by mouth every evening 3/15/2021 at PM Yes Reported, Patient   meloxicam (MOBIC) 15 MG tablet Take 15 mg by mouth every morning 2 WEEKS AGO Yes Reported, Patient   Menthol, Topical Analgesic, (BIOFREEZE ROLL-ON) 4 % GEL Apply topically 2 times daily as needed (pain) Past Week at PRN Yes Reported, Patient   metFORMIN (GLUCOPHAGE-XR) 500 MG 24 hr tablet Take 1,000 mg by mouth 2 times daily (with meals) (2 X 500MG)      (pt taking differently than prescribed; RX states #270 tabs/90 day supply = 3 tabs daily) 3/15/2021 at PM Yes Unknown, Entered By History   mometasone (NASONEX) 50 MCG/ACT nasal spray Spray 2 sprays into both nostrils daily 3/16/2021 at AM Yes Reported, Patient   montelukast (SINGULAIR) 10 MG tablet Take 10 mg by mouth every evening  3/15/2021 at PM Yes Reported, Patient   Multiple  Vitamins-Minerals (PRESERVISION AREDS 2 PO) Take 1 capsule by mouth 2 times daily 3/15/2021 at PM Yes Reported, Patient   Omega-3 Fatty Acids (FISH OIL PO) Take 1 capsule by mouth daily 2 WEEKS AGO Yes Reported, Patient   Prenatal Vit-Fe Fumarate-FA (PRENATAL 19) 29-1 MG CHEW Take 1 chew tab by mouth every evening  3/15/2021 at PM Yes Reported, Patient   pseudoePHEDrine (SUDOGEST 12 HOUR) 120 MG 12 hr tablet Take 120 mg by mouth At Bedtime  3/15/2021 at PM Yes Reported, Patient   senna-docusate (SENOKOT-S/PERICOLACE) 8.6-50 MG tablet Take 2-3 tablets by mouth At Bedtime  3/15/2021 at PM Yes Reported, Patient   triamterene-HCTZ (DYAZIDE) 37.5-25 MG capsule Take 1 capsule by mouth every morning 3/15/2021 at AM Yes Reported, Patient   order for DME Equipment being ordered: Walker Wheels () and Walker ()  Treatment Diagnosis: difficulty with gait   Ambar Wilson MD   ORDER FOR DME Equipment being ordered: rt Dwayne Guzman DO

## 2021-03-16 NOTE — OP NOTE
Procedure Date: 03/16/2021      PREOPERATIVE DIAGNOSES:  Spinal stenosis with myelopathy.      POSTOPERATIVE DIAGNOSES:  Spinal stenosis with myelopathy. Left hand ischemia.     PROCEDURES:  Placement of Yao headholder, MEP, SSEP, and EMG monitoring.      SURGEON:  Regan Collier MD      ASSISTANT:  Darlene Cortez PA-C      ANESTHESIA:  General with endotracheal tube.      INDICATIONS FOR PROCEDURE:  Ms. Templeton is a 70-year-old woman who has history of numbness in her right hand, weakness in the left deltoid, and intrinsic muscle weakness bilaterally.  Her preoperative evaluation revealed instability and incoordination in her gait, weakness in the intrinsic muscles of her hands and left deltoid.  Her preoperative imaging studies showed central stenosis at the C6-7 and C7-T1 levels and bilateral foraminal stenosis at multiple levels.  Because of the early physical exam findings consistent with myelopathy, it was recommended that she undergo operative treatment.      OPERATIVE PROCEDURE IN DETAIL:  After informed consent was obtained from the patient, satisfactory general anesthesia achieved, the technician from [facility] attached the stimulating and recording electrodes to the scalp and extremities and obtained baseline MEP, SSEP, and EMG tracings.  These findings showed minimal sensory response in the lower extremities below the knee as expected due to her diabetic peripheral neuropathy, but were otherwise relatively normal.  A Yao headholder was placed onto the patient's skull and adhesive tape was used to distract her shoulders distally.  In positioning the left arm, it was noted that the left hand was white and had poor capillary refill.  An IV on the left side had infiltrated preinduction and included in the medications given through the IV was Tyler-Synephrine.  There was no pulse oximeter reading from the fingers.  From the thumb, it was 80%.  Dr. Young, from vascular  surgery, happened to be in the OR next door and was consulted.  He was concerned about a possible compartment syndrome and recommended fasciotomies.  Dr. Clark, hand surgery was consulted, as well as Dr. Suero over the telephone.  Both recommended waking the patient up and if she had good function in her hand and no more than mild pain in the forearm, they would recommend just monitoring the arm closely. Dr. Young agreed with their plan.  The [place] headholder was removed.  The patient was awakened and extubated.  She did have good function of her entire hand.  She complained of minimal pain in the forearm, even with palpation of the forearm.  Therefore, she was taken from the operating room and was elected not to proceed with the anterior cervical fusion due to the problems with the left arm, and this will be rescheduled at a later date.  The estimated blood loss was nothing.         DEJUAN COLLIER MD             D: 2021   T: 2021   MT: TIN      Name:     LATONIA MARSH   MRN:      [id number]        Account:        [id number]   :      1950           Procedure Date: 2021      Document: [id number]       cc: Dejuan Collier MD

## 2021-03-16 NOTE — ANESTHESIA PROCEDURE NOTES
Airway   Date/Time: 3/16/2021 7:58 AM   Patient location during procedure: OR  Staff -   Anesthesiologist:  Ez Durand MD  CRNA: Penny Busby APRN CRNA  Other Anesthesia Staff: Delia Lane  Performed By: SHADE    Consent for Airway   Urgency: elective    Indications and Patient Condition  Indications for airway management: parish-procedural  Induction type:intravenousMask difficulty assessment: 2 - vent by mask + OA or adjuvant +/- NMBA    Final Airway Details  Final airway type: endotracheal airway  Successful airway:ETT - single  Endotracheal Airway Details   ETT size (mm): 7.0  Cuffed: yes  Successful intubation technique: video laryngoscopy  Grade View of Cords: 1  Adjucts: stylet  Measured from: gums/teeth  Secured at (cm): 21  Secured with: pink tape  Bite block used: Soft    Post intubation assessment   Placement verified by: capnometry and equal breath sounds   Number of attempts at approach: 1  Secured with:pink tape  Ease of procedure: easy  Dentition: Unchanged

## 2021-03-16 NOTE — PLAN OF CARE
Reviewed signs and symptoms of compartment syndrome with patient and spouse. Questions answered. Patient discharged to home with spouse and belongings at this time.

## 2021-03-16 NOTE — ANESTHESIA CARE TRANSFER NOTE
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    Anesthesia Type:   General     Note:    Oropharynx: oropharynx clear of all foreign objects and spontaneously breathing  Level of Consciousness: drowsy  Oxygen Supplementation: face mask  Level of Supplemental Oxygen (L/min / FiO2): 8  Independent Airway: airway patency satisfactory and stable  Dentition: dentition unchanged  Vital Signs Stable: post-procedure vital signs reviewed and stable  Report to RN Given: handoff report given  Patient transferred to: PACU    Handoff Report: Identifed the Patient, Identified the Reponsible Provider, Reviewed the pertinent medical history, Discussed the surgical course, Reviewed Intra-OP anesthesia mangement and issues during anesthesia, Set expectations for post-procedure period and Allowed opportunity for questions and acknowledgement of understanding      Vitals: (Last set prior to Anesthesia Care Transfer)  CRNA VITALS  3/16/2021 0938 - 3/16/2021 1023      3/16/2021             Pulse:  90    SpO2:  95 %    Resp Rate (set):  10        Electronically Signed By: INDIA Chacon CRNA  March 16, 2021  10:23 AM

## 2021-03-16 NOTE — PROGRESS NOTES
[facility]  [facility]  PRE-SURGICAL VISIT    Had pre-surgical visit with patient.  Provided spiritual support and prayer.     Follow-up visit after transfer to unit per request by PT.    Amos Boyce  Chaplain Resident

## 2021-03-18 DIAGNOSIS — Z11.59 ENCOUNTER FOR SCREENING FOR OTHER VIRAL DISEASES: ICD-10-CM

## 2021-03-19 DIAGNOSIS — Z11.59 ENCOUNTER FOR SCREENING FOR OTHER VIRAL DISEASES: ICD-10-CM

## 2021-03-19 LAB
SARS-COV-2 RNA RESP QL NAA+PROBE: NORMAL
SPECIMEN SOURCE: NORMAL

## 2021-03-19 PROCEDURE — 87635 SARS-COV-2 COVID-19 AMP PRB: CPT | Performed by: ORTHOPAEDIC SURGERY

## 2021-03-19 RX ORDER — TRIAMTERENE/HYDROCHLOROTHIAZID 37.5-25 MG
1 TABLET ORAL DAILY PRN
COMMUNITY

## 2021-03-19 RX ORDER — AMOXICILLIN 250 MG
2-3 CAPSULE ORAL AT BEDTIME
Status: ON HOLD | COMMUNITY
End: 2022-11-12

## 2021-03-19 NOTE — PROGRESS NOTES
PTA medications updated by Medication Scribe prior to surgery via phone call with patient        Comments:    Medication history sources: Patient, Surescrilaurent, H&P and Pharmacy ([facility])  Medication history source reliability: Moderate  Adherence assessment: N/A Not Observed    Significant changes made to the medication list:  Patient may be taking meds differently than prescribed; See PTA entries for:   Breo inhaler- last filled 11/30/20. Pt. has a new RX but has not filled yet, per pharmacy   Gabapentin - Pt. States takes 900 mg(3 X 300 mg) three times daily. RX states 600 mg twice daily and 900 mg  at bedtime per pharmacy,   Metformin -  Pt. states takes 1,000 mg twice daily since holding her Trulicity per MD. RX states 1,500 mg daily. Triamterene-HCTZ last filled 2/10/2021 for 30 day supply.     Patient reports no longer taking the following meds (med scribe removed from PTA med list): Alpha Lipoic Acid, Diclofenac gel      Additional medication history information:   Patient brought own home meds: Albuterol inhaler, Breo inhaler, Nasonex nasal spray and Verified medications with Pharmacy : Breo inhaler, Gabapentin, Triamterene-HCTZ 37.5-25 mg tablet        Prior to Admission medications    Medication Sig Last Dose Taking? Auth Provider   acetaminophen (TYLENOL) 500 MG tablet Take 1,000 mg by mouth 3 times daily (Takes with gabapentin dose) (2 X 500 mg)  at AM Yes Reported, Patient   albuterol (PROAIR HFA/PROVENTIL HFA/VENTOLIN HFA) 108 (90 BASE) MCG/ACT Inhaler Inhale 2 puffs into the lungs 2 times daily as needed for shortness of breath / dyspnea or wheezing   at PRN Yes Reported, Patient   cephALEXin (KEFLEX) 500 MG capsule Take 1,000 mg by mouth 2 times daily as needed (before and after dental procedure) (2 X 500 mg) over 1 year ago at AM Yes Reported, Patient   cholecalciferol (VITAMIN D3) 125 MCG (5000 UT) TABS tablet Take 5,000 Units by mouth daily  at AM Yes Reported, Patient   dulaglutide  (TRULICITY) 0.75 MG/0.5ML pen Inject 0.75 mg Subcutaneous every 7 days Tuesdays. 2/9/2021 at AM Yes Unknown, Entered By History   FIBER ADULT GUMMIES PO Take 2 chew tab by mouth every morning (FIBERWELL)   at AM Yes Reported, Patient   fluticasone-vilanterol (BREO ELLIPTA) 100-25 MCG/INH inhaler Inhale 1 puff into the lungs every morning (last filled 11/30/2020)  at AM Yes Reported, Patient   folic acid (FOLVITE) 400 MCG tablet Take 400 mcg by mouth every morning   at AM Yes Reported, Patient   gabapentin (NEURONTIN) 300 MG capsule Take 900 mg by mouth 3 times daily (3 x 300mg) Patient taking differently than prescribed.  (RX states:Take 600 mg (2 X 300 mg) morning and afternoon. Take 900 mg (3 X 300) nightly at bedtime, per pharmacy)  at AM Yes Unknown, Entered By History   Glucos-MSM-C-Yf-Kbfano-Yyrnsa (GLUCOSAMINE MSM COMPLEX) TABS tablet Take 1 tablet by mouth daily over 3 weeks ago at AM Yes Reported, Patient   guaiFENesin (MUCINEX) 600 MG 12 hr tablet Take 600 mg by mouth every morning   at AM Yes Unknown, Entered By History   lidocaine (LIDODERM) 5 % Patch Place 1 patch onto the skin daily as needed for moderate pain  at PRN Yes Reported, Patient   loratadine (CLARITIN) 10 MG tablet Take 10 mg by mouth every morning   at AM Yes Reported, Patient   magnesium gluconate (MAGONATE) 250 MG tablet Take 250 mg by mouth every evening  at PM Yes Reported, Patient   Menthol, Topical Analgesic, (BIOFREEZE ROLL-ON) 4 % GEL Apply topically 2 times daily as needed (pain)  at PRN Yes Reported, Patient   metFORMIN (GLUCOPHAGE-XR) 500 MG 24 hr tablet Take 1,000 mg by mouth 2 times daily (with meals) (2 X 500MG)     (Pt. states this in a new dose since holding her Trulicity per MD)    (pt taking differently than prescribed; RX states #270 tabs/90 day supply = 3 tabs daily)  at PM Yes Unknown, Entered By History   mometasone (NASONEX) 50 MCG/ACT nasal spray Spray 2 sprays into both nostrils every morning   at AM Yes Reported,  Patient   montelukast (SINGULAIR) 10 MG tablet Take 10 mg by mouth At Bedtime   at HS Yes Reported, Patient   Multiple Vitamins-Minerals (PRESERVISION AREDS 2 PO) Take 1 capsule by mouth 2 times daily  at HS Yes Reported, Patient   pseudoePHEDrine (SUDOGEST 12 HOUR) 120 MG 12 hr tablet Take 120 mg by mouth At Bedtime   at HS Yes Reported, Patient   senna-docusate (SENNA-PLUS) 8.6-50 MG tablet Take 2-3 tablets by mouth At Bedtime  at HS Yes Reported, Patient   triamterene-HCTZ (MAXZIDE-25) 37.5-25 MG tablet Take 1 tablet by mouth every morning (last filled 2/10/2021 for 30 day supply)  at AM Yes Reported, Patient   aspirin 81 MG EC tablet Take 81 mg by mouth every evening over 2 weeks ago at PM  Reported, Patient   meloxicam (MOBIC) 15 MG tablet Take 15 mg by mouth every morning over 3 weeks ago at AM  Reported, Patient

## 2021-03-20 ENCOUNTER — HEALTH MAINTENANCE LETTER (OUTPATIENT)
Age: 71
End: 2021-03-20

## 2021-03-20 LAB
LABORATORY COMMENT REPORT: NORMAL
SARS-COV-2 RNA RESP QL NAA+PROBE: NEGATIVE
SPECIMEN SOURCE: NORMAL

## 2021-03-21 LAB — INTERPRETATION ECG - MUSE: NORMAL

## 2021-03-22 ENCOUNTER — HOSPITAL ENCOUNTER (INPATIENT)
Facility: CLINIC | Age: 71
LOS: 2 days | Discharge: HOME | DRG: 472 | End: 2021-03-24
Attending: ORTHOPAEDIC SURGERY | Admitting: ORTHOPAEDIC SURGERY
Payer: MEDICARE

## 2021-03-22 ENCOUNTER — APPOINTMENT (OUTPATIENT)
Dept: GENERAL RADIOLOGY | Facility: CLINIC | Age: 71
DRG: 472 | End: 2021-03-22
Attending: ORTHOPAEDIC SURGERY
Payer: MEDICARE

## 2021-03-22 ENCOUNTER — ANESTHESIA EVENT (OUTPATIENT)
Dept: SURGERY | Facility: CLINIC | Age: 71
DRG: 472 | End: 2021-03-22
Payer: MEDICARE

## 2021-03-22 ENCOUNTER — ANESTHESIA (OUTPATIENT)
Dept: SURGERY | Facility: CLINIC | Age: 71
DRG: 472 | End: 2021-03-22
Payer: MEDICARE

## 2021-03-22 DIAGNOSIS — M47.12 CERVICAL SPONDYLOSIS WITH MYELOPATHY AND RADICULOPATHY: Primary | ICD-10-CM

## 2021-03-22 DIAGNOSIS — M47.22 CERVICAL SPONDYLOSIS WITH MYELOPATHY AND RADICULOPATHY: Primary | ICD-10-CM

## 2021-03-22 PROBLEM — M54.12 CERVICAL MYELOPATHY WITH CERVICAL RADICULOPATHY (H): Status: ACTIVE | Noted: 2021-03-22

## 2021-03-22 PROBLEM — G95.9 CERVICAL MYELOPATHY WITH CERVICAL RADICULOPATHY (H): Status: ACTIVE | Noted: 2021-03-22

## 2021-03-22 PROBLEM — E11.9 TYPE 2 DIABETES MELLITUS WITHOUT COMPLICATION, WITHOUT LONG-TERM CURRENT USE OF INSULIN (H): Status: ACTIVE | Noted: 2021-03-22

## 2021-03-22 PROBLEM — M48.062 SPINAL STENOSIS, LUMBAR REGION WITH NEUROGENIC CLAUDICATION: Status: ACTIVE | Noted: 2020-01-10

## 2021-03-22 PROBLEM — I10 ESSENTIAL (PRIMARY) HYPERTENSION: Status: ACTIVE | Noted: 2020-01-10

## 2021-03-22 LAB
ABO + RH BLD: NORMAL
ABO + RH BLD: NORMAL
BLD GP AB SCN SERPL QL: NORMAL
BLOOD BANK CMNT PATIENT-IMP: NORMAL
CREAT SERPL-MCNC: 0.54 MG/DL (ref 0.52–1.04)
GFR SERPL CREATININE-BSD FRML MDRD: >90 ML/MIN/{1.73_M2}
GLUCOSE BLDC GLUCOMTR-MCNC: 109 MG/DL (ref 70–99)
GLUCOSE BLDC GLUCOMTR-MCNC: 111 MG/DL (ref 70–99)
GLUCOSE BLDC GLUCOMTR-MCNC: 157 MG/DL (ref 70–99)
GLUCOSE SERPL-MCNC: 176 MG/DL (ref 70–99)
POTASSIUM SERPL-SCNC: 3.5 MMOL/L (ref 3.4–5.3)
SPECIMEN EXP DATE BLD: NORMAL

## 2021-03-22 PROCEDURE — 86900 BLOOD TYPING SEROLOGIC ABO: CPT | Performed by: ANESTHESIOLOGY

## 2021-03-22 PROCEDURE — 250N000009 HC RX 250: Performed by: ORTHOPAEDIC SURGERY

## 2021-03-22 PROCEDURE — 272N000001 HC OR GENERAL SUPPLY STERILE: Performed by: ORTHOPAEDIC SURGERY

## 2021-03-22 PROCEDURE — 999N000063 XR CERVICAL SPINE PORT 1 VW

## 2021-03-22 PROCEDURE — 82947 ASSAY GLUCOSE BLOOD QUANT: CPT | Performed by: ANESTHESIOLOGY

## 2021-03-22 PROCEDURE — 250N000013 HC RX MED GY IP 250 OP 250 PS 637: Performed by: ORTHOPAEDIC SURGERY

## 2021-03-22 PROCEDURE — 99222 1ST HOSP IP/OBS MODERATE 55: CPT | Performed by: HOSPITALIST

## 2021-03-22 PROCEDURE — 120N000001 HC R&B MED SURG/OB

## 2021-03-22 PROCEDURE — 250N000025 HC SEVOFLURANE, PER MIN: Performed by: ORTHOPAEDIC SURGERY

## 2021-03-22 PROCEDURE — 999N000141 HC STATISTIC PRE-PROCEDURE NURSING ASSESSMENT: Performed by: ORTHOPAEDIC SURGERY

## 2021-03-22 PROCEDURE — 360N000085 HC SURGERY LEVEL 5 W/ FLUORO, PER MIN: Performed by: ORTHOPAEDIC SURGERY

## 2021-03-22 PROCEDURE — 278N000051 HC OR IMPLANT GENERAL: Performed by: ORTHOPAEDIC SURGERY

## 2021-03-22 PROCEDURE — 82565 ASSAY OF CREATININE: CPT | Performed by: ANESTHESIOLOGY

## 2021-03-22 PROCEDURE — 922N000001 HC NEURO MONITORING SERVICE, UP TO 7 HOURS (T1FEE): Performed by: ORTHOPAEDIC SURGERY

## 2021-03-22 PROCEDURE — 94660 CPAP INITIATION&MGMT: CPT

## 2021-03-22 PROCEDURE — C1713 ANCHOR/SCREW BN/BN,TIS/BN: HCPCS | Performed by: ORTHOPAEDIC SURGERY

## 2021-03-22 PROCEDURE — 86901 BLOOD TYPING SEROLOGIC RH(D): CPT | Performed by: ANESTHESIOLOGY

## 2021-03-22 PROCEDURE — 250N000009 HC RX 250: Performed by: NURSE ANESTHETIST, CERTIFIED REGISTERED

## 2021-03-22 PROCEDURE — 250N000009 HC RX 250: Performed by: ANESTHESIOLOGY

## 2021-03-22 PROCEDURE — 370N000017 HC ANESTHESIA TECHNICAL FEE, PER MIN: Performed by: ORTHOPAEDIC SURGERY

## 2021-03-22 PROCEDURE — 250N000011 HC RX IP 250 OP 636: Performed by: NURSE ANESTHETIST, CERTIFIED REGISTERED

## 2021-03-22 PROCEDURE — 250N000011 HC RX IP 250 OP 636: Performed by: ORTHOPAEDIC SURGERY

## 2021-03-22 PROCEDURE — 250N000011 HC RX IP 250 OP 636: Performed by: REGISTERED NURSE

## 2021-03-22 PROCEDURE — 999N001017 HC STATISTIC GLUCOSE BY METER IP

## 2021-03-22 PROCEDURE — 99207 PR CONSULT E&M CHANGED TO INITIAL LEVEL: CPT | Performed by: HOSPITALIST

## 2021-03-22 PROCEDURE — 258N000003 HC RX IP 258 OP 636: Performed by: ANESTHESIOLOGY

## 2021-03-22 PROCEDURE — 250N000009 HC RX 250: Performed by: REGISTERED NURSE

## 2021-03-22 PROCEDURE — 999N000157 HC STATISTIC RCP TIME EA 10 MIN

## 2021-03-22 PROCEDURE — 258N000003 HC RX IP 258 OP 636: Performed by: REGISTERED NURSE

## 2021-03-22 PROCEDURE — 258N000003 HC RX IP 258 OP 636: Performed by: NURSE ANESTHETIST, CERTIFIED REGISTERED

## 2021-03-22 PROCEDURE — 250N000011 HC RX IP 250 OP 636: Performed by: ANESTHESIOLOGY

## 2021-03-22 PROCEDURE — 86850 RBC ANTIBODY SCREEN: CPT | Performed by: ANESTHESIOLOGY

## 2021-03-22 PROCEDURE — 258N000003 HC RX IP 258 OP 636: Performed by: ORTHOPAEDIC SURGERY

## 2021-03-22 PROCEDURE — 84132 ASSAY OF SERUM POTASSIUM: CPT | Performed by: ANESTHESIOLOGY

## 2021-03-22 PROCEDURE — 36415 COLL VENOUS BLD VENIPUNCTURE: CPT | Performed by: ANESTHESIOLOGY

## 2021-03-22 PROCEDURE — 710N000009 HC RECOVERY PHASE 1, LEVEL 1, PER MIN: Performed by: ORTHOPAEDIC SURGERY

## 2021-03-22 DEVICE — IMPLANTABLE DEVICE: Type: IMPLANTABLE DEVICE | Site: NECK | Status: FUNCTIONAL

## 2021-03-22 RX ORDER — METFORMIN HCL 500 MG
1000 TABLET, EXTENDED RELEASE 24 HR ORAL 2 TIMES DAILY WITH MEALS
Status: DISCONTINUED | OUTPATIENT
Start: 2021-03-23 | End: 2021-03-22

## 2021-03-22 RX ORDER — HYDRALAZINE HYDROCHLORIDE 20 MG/ML
2.5-5 INJECTION INTRAMUSCULAR; INTRAVENOUS EVERY 10 MIN PRN
Status: DISCONTINUED | OUTPATIENT
Start: 2021-03-22 | End: 2021-03-22 | Stop reason: HOSPADM

## 2021-03-22 RX ORDER — BISACODYL 10 MG
10 SUPPOSITORY, RECTAL RECTAL DAILY PRN
Status: DISCONTINUED | OUTPATIENT
Start: 2021-03-22 | End: 2021-03-24 | Stop reason: HOSPADM

## 2021-03-22 RX ORDER — NALOXONE HYDROCHLORIDE 0.4 MG/ML
0.4 INJECTION, SOLUTION INTRAMUSCULAR; INTRAVENOUS; SUBCUTANEOUS
Status: DISCONTINUED | OUTPATIENT
Start: 2021-03-22 | End: 2021-03-24 | Stop reason: HOSPADM

## 2021-03-22 RX ORDER — MEPERIDINE HYDROCHLORIDE 25 MG/ML
12.5 INJECTION INTRAMUSCULAR; INTRAVENOUS; SUBCUTANEOUS EVERY 5 MIN PRN
Status: DISCONTINUED | OUTPATIENT
Start: 2021-03-22 | End: 2021-03-22 | Stop reason: HOSPADM

## 2021-03-22 RX ORDER — GABAPENTIN 300 MG/1
900 CAPSULE ORAL 3 TIMES DAILY
Status: DISCONTINUED | OUTPATIENT
Start: 2021-03-22 | End: 2021-03-24 | Stop reason: HOSPADM

## 2021-03-22 RX ORDER — CEFAZOLIN SODIUM 1 G/3ML
1 INJECTION, POWDER, FOR SOLUTION INTRAMUSCULAR; INTRAVENOUS EVERY 8 HOURS
Status: COMPLETED | OUTPATIENT
Start: 2021-03-22 | End: 2021-03-23

## 2021-03-22 RX ORDER — ACETAMINOPHEN 500 MG
1000 TABLET ORAL 3 TIMES DAILY
Status: DISCONTINUED | OUTPATIENT
Start: 2021-03-22 | End: 2021-03-24 | Stop reason: HOSPADM

## 2021-03-22 RX ORDER — ACETAMINOPHEN 325 MG/1
650 TABLET ORAL EVERY 4 HOURS PRN
Status: DISCONTINUED | OUTPATIENT
Start: 2021-03-25 | End: 2021-03-22

## 2021-03-22 RX ORDER — ONDANSETRON 4 MG/1
4 TABLET, ORALLY DISINTEGRATING ORAL EVERY 6 HOURS PRN
Status: DISCONTINUED | OUTPATIENT
Start: 2021-03-22 | End: 2021-03-24 | Stop reason: HOSPADM

## 2021-03-22 RX ORDER — FENTANYL CITRATE 50 UG/ML
25-50 INJECTION, SOLUTION INTRAMUSCULAR; INTRAVENOUS
Status: DISCONTINUED | OUTPATIENT
Start: 2021-03-22 | End: 2021-03-22 | Stop reason: HOSPADM

## 2021-03-22 RX ORDER — HYDRALAZINE HYDROCHLORIDE 20 MG/ML
10 INJECTION INTRAMUSCULAR; INTRAVENOUS EVERY 4 HOURS PRN
Status: DISCONTINUED | OUTPATIENT
Start: 2021-03-22 | End: 2021-03-24 | Stop reason: HOSPADM

## 2021-03-22 RX ORDER — LIDOCAINE 40 MG/G
CREAM TOPICAL
Status: DISCONTINUED | OUTPATIENT
Start: 2021-03-22 | End: 2021-03-24 | Stop reason: HOSPADM

## 2021-03-22 RX ORDER — MAGNESIUM AMINO ACID CHELATE 27 MG
250 TABLET ORAL EVERY EVENING
Status: DISCONTINUED | OUTPATIENT
Start: 2021-03-22 | End: 2021-03-24 | Stop reason: HOSPADM

## 2021-03-22 RX ORDER — NALOXONE HYDROCHLORIDE 0.4 MG/ML
0.2 INJECTION, SOLUTION INTRAMUSCULAR; INTRAVENOUS; SUBCUTANEOUS
Status: DISCONTINUED | OUTPATIENT
Start: 2021-03-22 | End: 2021-03-22

## 2021-03-22 RX ORDER — NALOXONE HYDROCHLORIDE 0.4 MG/ML
0.2 INJECTION, SOLUTION INTRAMUSCULAR; INTRAVENOUS; SUBCUTANEOUS
Status: DISCONTINUED | OUTPATIENT
Start: 2021-03-22 | End: 2021-03-24 | Stop reason: HOSPADM

## 2021-03-22 RX ORDER — HYDROXYZINE HYDROCHLORIDE 10 MG/1
10 TABLET, FILM COATED ORAL EVERY 6 HOURS PRN
Status: DISCONTINUED | OUTPATIENT
Start: 2021-03-22 | End: 2021-03-24 | Stop reason: HOSPADM

## 2021-03-22 RX ORDER — GUAIFENESIN 600 MG/1
600 TABLET, EXTENDED RELEASE ORAL EVERY MORNING
Status: DISCONTINUED | OUTPATIENT
Start: 2021-03-23 | End: 2021-03-24 | Stop reason: HOSPADM

## 2021-03-22 RX ORDER — AMOXICILLIN 250 MG
2-3 CAPSULE ORAL AT BEDTIME
Status: DISCONTINUED | OUTPATIENT
Start: 2021-03-22 | End: 2021-03-24 | Stop reason: HOSPADM

## 2021-03-22 RX ORDER — POLYETHYLENE GLYCOL 3350 17 G/17G
17 POWDER, FOR SOLUTION ORAL DAILY
Status: DISCONTINUED | OUTPATIENT
Start: 2021-03-23 | End: 2021-03-24 | Stop reason: HOSPADM

## 2021-03-22 RX ORDER — SODIUM CHLORIDE, SODIUM LACTATE, POTASSIUM CHLORIDE, CALCIUM CHLORIDE 600; 310; 30; 20 MG/100ML; MG/100ML; MG/100ML; MG/100ML
INJECTION, SOLUTION INTRAVENOUS CONTINUOUS PRN
Status: DISCONTINUED | OUTPATIENT
Start: 2021-03-22 | End: 2021-03-22

## 2021-03-22 RX ORDER — PSEUDOEPHEDRINE HCL 120 MG/1
120 TABLET, FILM COATED, EXTENDED RELEASE ORAL AT BEDTIME
Status: DISCONTINUED | OUTPATIENT
Start: 2021-03-22 | End: 2021-03-24 | Stop reason: HOSPADM

## 2021-03-22 RX ORDER — LABETALOL HYDROCHLORIDE 5 MG/ML
10 INJECTION, SOLUTION INTRAVENOUS
Status: COMPLETED | OUTPATIENT
Start: 2021-03-22 | End: 2021-03-22

## 2021-03-22 RX ORDER — TRANEXAMIC ACID 10 MG/ML
1 INJECTION, SOLUTION INTRAVENOUS ONCE
Status: COMPLETED | OUTPATIENT
Start: 2021-03-22 | End: 2021-03-22

## 2021-03-22 RX ORDER — NICOTINE POLACRILEX 4 MG
15-30 LOZENGE BUCCAL
Status: DISCONTINUED | OUTPATIENT
Start: 2021-03-22 | End: 2021-03-24 | Stop reason: HOSPADM

## 2021-03-22 RX ORDER — MONTELUKAST SODIUM 10 MG/1
10 TABLET ORAL AT BEDTIME
Status: DISCONTINUED | OUTPATIENT
Start: 2021-03-22 | End: 2021-03-24 | Stop reason: HOSPADM

## 2021-03-22 RX ORDER — ONDANSETRON 2 MG/ML
4 INJECTION INTRAMUSCULAR; INTRAVENOUS EVERY 6 HOURS PRN
Status: DISCONTINUED | OUTPATIENT
Start: 2021-03-22 | End: 2021-03-24 | Stop reason: HOSPADM

## 2021-03-22 RX ORDER — ONDANSETRON 2 MG/ML
INJECTION INTRAMUSCULAR; INTRAVENOUS PRN
Status: DISCONTINUED | OUTPATIENT
Start: 2021-03-22 | End: 2021-03-22

## 2021-03-22 RX ORDER — ONDANSETRON 2 MG/ML
4 INJECTION INTRAMUSCULAR; INTRAVENOUS EVERY 30 MIN PRN
Status: DISCONTINUED | OUTPATIENT
Start: 2021-03-22 | End: 2021-03-22 | Stop reason: HOSPADM

## 2021-03-22 RX ORDER — LORATADINE 10 MG/1
10 TABLET ORAL EVERY MORNING
Status: DISCONTINUED | OUTPATIENT
Start: 2021-03-23 | End: 2021-03-24 | Stop reason: HOSPADM

## 2021-03-22 RX ORDER — ACETAMINOPHEN 325 MG/1
975 TABLET ORAL ONCE
Status: COMPLETED | OUTPATIENT
Start: 2021-03-22 | End: 2021-03-22

## 2021-03-22 RX ORDER — NALOXONE HYDROCHLORIDE 0.4 MG/ML
0.4 INJECTION, SOLUTION INTRAMUSCULAR; INTRAVENOUS; SUBCUTANEOUS
Status: DISCONTINUED | OUTPATIENT
Start: 2021-03-22 | End: 2021-03-22

## 2021-03-22 RX ORDER — FENTANYL CITRATE 50 UG/ML
INJECTION, SOLUTION INTRAMUSCULAR; INTRAVENOUS PRN
Status: DISCONTINUED | OUTPATIENT
Start: 2021-03-22 | End: 2021-03-22

## 2021-03-22 RX ORDER — METHOCARBAMOL 750 MG/1
750 TABLET, FILM COATED ORAL EVERY 6 HOURS PRN
Status: DISCONTINUED | OUTPATIENT
Start: 2021-03-22 | End: 2021-03-24 | Stop reason: HOSPADM

## 2021-03-22 RX ORDER — FLUTICASONE PROPIONATE 50 MCG
2 SPRAY, SUSPENSION (ML) NASAL EVERY MORNING
Status: DISCONTINUED | OUTPATIENT
Start: 2021-03-23 | End: 2021-03-22

## 2021-03-22 RX ORDER — HYDROMORPHONE HYDROCHLORIDE 1 MG/ML
.3-.5 INJECTION, SOLUTION INTRAMUSCULAR; INTRAVENOUS; SUBCUTANEOUS
Status: DISCONTINUED | OUTPATIENT
Start: 2021-03-22 | End: 2021-03-24 | Stop reason: HOSPADM

## 2021-03-22 RX ORDER — LIDOCAINE HYDROCHLORIDE 20 MG/ML
INJECTION, SOLUTION INFILTRATION; PERINEURAL PRN
Status: DISCONTINUED | OUTPATIENT
Start: 2021-03-22 | End: 2021-03-22

## 2021-03-22 RX ORDER — ONDANSETRON 4 MG/1
4 TABLET, ORALLY DISINTEGRATING ORAL EVERY 30 MIN PRN
Status: DISCONTINUED | OUTPATIENT
Start: 2021-03-22 | End: 2021-03-22 | Stop reason: HOSPADM

## 2021-03-22 RX ORDER — NEOSTIGMINE METHYLSULFATE 1 MG/ML
VIAL (ML) INJECTION PRN
Status: DISCONTINUED | OUTPATIENT
Start: 2021-03-22 | End: 2021-03-22

## 2021-03-22 RX ORDER — TRIAMTERENE/HYDROCHLOROTHIAZID 37.5-25 MG
1 TABLET ORAL EVERY MORNING
Status: DISCONTINUED | OUTPATIENT
Start: 2021-03-23 | End: 2021-03-24 | Stop reason: HOSPADM

## 2021-03-22 RX ORDER — CEFAZOLIN SODIUM 2 G/100ML
2 INJECTION, SOLUTION INTRAVENOUS
Status: DISCONTINUED | OUTPATIENT
Start: 2021-03-22 | End: 2021-03-22 | Stop reason: HOSPADM

## 2021-03-22 RX ORDER — CALCIUM POLYCARBOPHIL 625 MG 625 MG/1
625 TABLET ORAL EVERY MORNING
Status: DISCONTINUED | OUTPATIENT
Start: 2021-03-23 | End: 2021-03-24 | Stop reason: HOSPADM

## 2021-03-22 RX ORDER — FENTANYL CITRATE 50 UG/ML
25-100 INJECTION, SOLUTION INTRAMUSCULAR; INTRAVENOUS
Status: DISCONTINUED | OUTPATIENT
Start: 2021-03-22 | End: 2021-03-22 | Stop reason: HOSPADM

## 2021-03-22 RX ORDER — SODIUM CHLORIDE, SODIUM LACTATE, POTASSIUM CHLORIDE, CALCIUM CHLORIDE 600; 310; 30; 20 MG/100ML; MG/100ML; MG/100ML; MG/100ML
INJECTION, SOLUTION INTRAVENOUS CONTINUOUS
Status: DISCONTINUED | OUTPATIENT
Start: 2021-03-22 | End: 2021-03-22 | Stop reason: HOSPADM

## 2021-03-22 RX ORDER — ACETAMINOPHEN 325 MG/1
975 TABLET ORAL EVERY 8 HOURS
Status: DISCONTINUED | OUTPATIENT
Start: 2021-03-22 | End: 2021-03-22

## 2021-03-22 RX ORDER — GLYCOPYRROLATE 0.2 MG/ML
INJECTION, SOLUTION INTRAMUSCULAR; INTRAVENOUS PRN
Status: DISCONTINUED | OUTPATIENT
Start: 2021-03-22 | End: 2021-03-22

## 2021-03-22 RX ORDER — MOMETASONE FUROATE MONOHYDRATE 50 UG/1
2 SPRAY, METERED NASAL DAILY
Status: DISCONTINUED | OUTPATIENT
Start: 2021-03-23 | End: 2021-03-24 | Stop reason: HOSPADM

## 2021-03-22 RX ORDER — DOCUSATE SODIUM 100 MG/1
100 CAPSULE, LIQUID FILLED ORAL 2 TIMES DAILY
Status: DISCONTINUED | OUTPATIENT
Start: 2021-03-22 | End: 2021-03-24 | Stop reason: HOSPADM

## 2021-03-22 RX ORDER — GABAPENTIN 100 MG/1
100 CAPSULE ORAL
Status: DISCONTINUED | OUTPATIENT
Start: 2021-03-22 | End: 2021-03-22 | Stop reason: HOSPADM

## 2021-03-22 RX ORDER — DEXTROSE MONOHYDRATE 25 G/50ML
25-50 INJECTION, SOLUTION INTRAVENOUS
Status: DISCONTINUED | OUTPATIENT
Start: 2021-03-22 | End: 2021-03-24 | Stop reason: HOSPADM

## 2021-03-22 RX ORDER — SODIUM CHLORIDE 9 MG/ML
INJECTION, SOLUTION INTRAVENOUS CONTINUOUS
Status: DISCONTINUED | OUTPATIENT
Start: 2021-03-22 | End: 2021-03-24 | Stop reason: HOSPADM

## 2021-03-22 RX ORDER — LANOLIN ALCOHOL/MO/W.PET/CERES
400 CREAM (GRAM) TOPICAL EVERY MORNING
Status: DISCONTINUED | OUTPATIENT
Start: 2021-03-23 | End: 2021-03-24 | Stop reason: HOSPADM

## 2021-03-22 RX ORDER — AMOXICILLIN 250 MG
1 CAPSULE ORAL 2 TIMES DAILY
Status: DISCONTINUED | OUTPATIENT
Start: 2021-03-22 | End: 2021-03-22

## 2021-03-22 RX ORDER — VIT C/E/ZN/COPPR/LUTEIN/ZEAXAN 60 MG-6 MG
1 CAPSULE ORAL 2 TIMES DAILY
Status: DISCONTINUED | OUTPATIENT
Start: 2021-03-22 | End: 2021-03-24 | Stop reason: HOSPADM

## 2021-03-22 RX ORDER — CEFAZOLIN SODIUM 2 G/100ML
2 INJECTION, SOLUTION INTRAVENOUS SEE ADMIN INSTRUCTIONS
Status: DISCONTINUED | OUTPATIENT
Start: 2021-03-22 | End: 2021-03-22 | Stop reason: HOSPADM

## 2021-03-22 RX ORDER — OXYCODONE HYDROCHLORIDE 5 MG/1
5-10 TABLET ORAL EVERY 4 HOURS PRN
Status: DISCONTINUED | OUTPATIENT
Start: 2021-03-22 | End: 2021-03-24 | Stop reason: HOSPADM

## 2021-03-22 RX ORDER — ALBUTEROL SULFATE 90 UG/1
2 AEROSOL, METERED RESPIRATORY (INHALATION) 2 TIMES DAILY PRN
Status: DISCONTINUED | OUTPATIENT
Start: 2021-03-22 | End: 2021-03-24 | Stop reason: HOSPADM

## 2021-03-22 RX ORDER — PROPOFOL 10 MG/ML
INJECTION, EMULSION INTRAVENOUS PRN
Status: DISCONTINUED | OUTPATIENT
Start: 2021-03-22 | End: 2021-03-22

## 2021-03-22 RX ORDER — PROPOFOL 10 MG/ML
INJECTION, EMULSION INTRAVENOUS CONTINUOUS PRN
Status: DISCONTINUED | OUTPATIENT
Start: 2021-03-22 | End: 2021-03-22

## 2021-03-22 RX ORDER — HYDROMORPHONE HYDROCHLORIDE 1 MG/ML
.3-.5 INJECTION, SOLUTION INTRAMUSCULAR; INTRAVENOUS; SUBCUTANEOUS EVERY 5 MIN PRN
Status: DISCONTINUED | OUTPATIENT
Start: 2021-03-22 | End: 2021-03-22 | Stop reason: HOSPADM

## 2021-03-22 RX ADMIN — ROCURONIUM BROMIDE 10 MG: 10 INJECTION INTRAVENOUS at 15:01

## 2021-03-22 RX ADMIN — SUCCINYLCHOLINE CHLORIDE 100 MG: 20 INJECTION, SOLUTION INTRAMUSCULAR; INTRAVENOUS; PARENTERAL at 13:51

## 2021-03-22 RX ADMIN — FENTANYL CITRATE 50 MCG: 50 INJECTION, SOLUTION INTRAMUSCULAR; INTRAVENOUS at 13:51

## 2021-03-22 RX ADMIN — PROPOFOL 125 MCG/KG/MIN: 10 INJECTION, EMULSION INTRAVENOUS at 14:08

## 2021-03-22 RX ADMIN — Medication 1 CAPSULE: at 22:15

## 2021-03-22 RX ADMIN — PHENYLEPHRINE HYDROCHLORIDE 150 MCG: 10 INJECTION INTRAVENOUS at 14:09

## 2021-03-22 RX ADMIN — SENNOSIDES AND DOCUSATE SODIUM 2 TABLET: 8.6; 5 TABLET ORAL at 22:15

## 2021-03-22 RX ADMIN — CEFAZOLIN 1 G: 1 INJECTION, POWDER, FOR SOLUTION INTRAMUSCULAR; INTRAVENOUS at 22:55

## 2021-03-22 RX ADMIN — LIDOCAINE HYDROCHLORIDE 80 MG: 20 INJECTION, SOLUTION INFILTRATION; PERINEURAL at 13:51

## 2021-03-22 RX ADMIN — PHENYLEPHRINE HYDROCHLORIDE 0.5 MCG/KG/MIN: 10 INJECTION INTRAVENOUS at 14:09

## 2021-03-22 RX ADMIN — PROPOFOL 170 MG: 10 INJECTION, EMULSION INTRAVENOUS at 13:51

## 2021-03-22 RX ADMIN — HYDROMORPHONE HYDROCHLORIDE 0.5 MG: 1 INJECTION, SOLUTION INTRAMUSCULAR; INTRAVENOUS; SUBCUTANEOUS at 17:23

## 2021-03-22 RX ADMIN — GABAPENTIN 900 MG: 300 CAPSULE ORAL at 22:15

## 2021-03-22 RX ADMIN — MONTELUKAST 10 MG: 10 TABLET, FILM COATED ORAL at 22:15

## 2021-03-22 RX ADMIN — MIDAZOLAM 2 MG: 1 INJECTION INTRAMUSCULAR; INTRAVENOUS at 13:47

## 2021-03-22 RX ADMIN — OXYCODONE HYDROCHLORIDE 5 MG: 5 TABLET ORAL at 22:15

## 2021-03-22 RX ADMIN — SODIUM CHLORIDE, POTASSIUM CHLORIDE, SODIUM LACTATE AND CALCIUM CHLORIDE: 600; 310; 30; 20 INJECTION, SOLUTION INTRAVENOUS at 13:45

## 2021-03-22 RX ADMIN — LABETALOL HYDROCHLORIDE 10 MG: 5 INJECTION, SOLUTION INTRAVENOUS at 19:37

## 2021-03-22 RX ADMIN — PHENYLEPHRINE HYDROCHLORIDE 100 MCG: 10 INJECTION INTRAVENOUS at 17:36

## 2021-03-22 RX ADMIN — LIDOCAINE HYDROCHLORIDE 1 ML: 10 INJECTION, SOLUTION EPIDURAL; INFILTRATION; INTRACAUDAL; PERINEURAL at 11:09

## 2021-03-22 RX ADMIN — PHENYLEPHRINE HYDROCHLORIDE 100 MCG: 10 INJECTION INTRAVENOUS at 16:09

## 2021-03-22 RX ADMIN — PHENYLEPHRINE HYDROCHLORIDE 50 MCG: 10 INJECTION INTRAVENOUS at 16:32

## 2021-03-22 RX ADMIN — GLYCOPYRROLATE 0.6 MG: 0.2 INJECTION, SOLUTION INTRAMUSCULAR; INTRAVENOUS at 17:47

## 2021-03-22 RX ADMIN — FENTANYL CITRATE 50 MCG: 0.05 INJECTION, SOLUTION INTRAMUSCULAR; INTRAVENOUS at 18:17

## 2021-03-22 RX ADMIN — PSEUDOEPHEDRINE HCL 120 MG: 120 TABLET, FILM COATED, EXTENDED RELEASE ORAL at 22:55

## 2021-03-22 RX ADMIN — HYDROMORPHONE HYDROCHLORIDE 0.5 MG: 1 INJECTION, SOLUTION INTRAMUSCULAR; INTRAVENOUS; SUBCUTANEOUS at 18:34

## 2021-03-22 RX ADMIN — CEFAZOLIN SODIUM 2 G: 2 INJECTION, SOLUTION INTRAVENOUS at 14:15

## 2021-03-22 RX ADMIN — TRANEXAMIC ACID 1 G: 10 INJECTION, SOLUTION INTRAVENOUS at 14:21

## 2021-03-22 RX ADMIN — ONDANSETRON 4 MG: 2 INJECTION INTRAMUSCULAR; INTRAVENOUS at 17:37

## 2021-03-22 RX ADMIN — PHENYLEPHRINE HYDROCHLORIDE 100 MCG: 10 INJECTION INTRAVENOUS at 17:42

## 2021-03-22 RX ADMIN — DOCUSATE SODIUM 100 MG: 100 CAPSULE, LIQUID FILLED ORAL at 22:15

## 2021-03-22 RX ADMIN — PHENYLEPHRINE HYDROCHLORIDE 100 MCG: 10 INJECTION INTRAVENOUS at 17:40

## 2021-03-22 RX ADMIN — ACETAMINOPHEN 975 MG: 325 TABLET, FILM COATED ORAL at 11:11

## 2021-03-22 RX ADMIN — ACETAMINOPHEN 1000 MG: 500 TABLET, FILM COATED ORAL at 22:14

## 2021-03-22 RX ADMIN — PROPOFOL 50 MG: 10 INJECTION, EMULSION INTRAVENOUS at 14:29

## 2021-03-22 RX ADMIN — FENTANYL CITRATE 50 MCG: 50 INJECTION, SOLUTION INTRAMUSCULAR; INTRAVENOUS at 14:53

## 2021-03-22 RX ADMIN — NEOSTIGMINE METHYLSULFATE 4 MG: 1 INJECTION, SOLUTION INTRAVENOUS at 17:47

## 2021-03-22 RX ADMIN — FENTANYL CITRATE 50 MCG: 50 INJECTION, SOLUTION INTRAMUSCULAR; INTRAVENOUS at 17:34

## 2021-03-22 RX ADMIN — REMIFENTANIL HYDROCHLORIDE 0.2 MCG/KG/MIN: 1 INJECTION, POWDER, LYOPHILIZED, FOR SOLUTION INTRAVENOUS at 14:06

## 2021-03-22 RX ADMIN — Medication 250 MG: at 22:15

## 2021-03-22 RX ADMIN — SODIUM CHLORIDE: 9 INJECTION, SOLUTION INTRAVENOUS at 20:44

## 2021-03-22 RX ADMIN — SODIUM CHLORIDE, POTASSIUM CHLORIDE, SODIUM LACTATE AND CALCIUM CHLORIDE: 600; 310; 30; 20 INJECTION, SOLUTION INTRAVENOUS at 11:09

## 2021-03-22 ASSESSMENT — MIFFLIN-ST. JEOR: SCORE: 1273.86

## 2021-03-22 NOTE — ANESTHESIA CARE TRANSFER NOTE
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    Anesthesia Type:   General     Note:    Oropharynx: oropharynx clear of all foreign objects and spontaneously breathing  Level of Consciousness: awake  Oxygen Supplementation: face mask  Level of Supplemental Oxygen (L/min / FiO2): 10  Independent Airway: airway patency satisfactory and stable  Dentition: dentition unchanged  Vital Signs Stable: post-procedure vital signs reviewed and stable  Report to RN Given: handoff report given  Patient transferred to: PACU  Comments: Pt awake and able to verbalize needs. ALL monitors on and audible. Vital signs stable. Spontaneous respiration without difficulty. o2 sats 94% on 10Lo2 per face mask. Pt denies pain. Report given to PACU RN.  Handoff Report: Identifed the Patient, Identified the Reponsible Provider, Reviewed the pertinent medical history, Discussed the surgical course, Reviewed Intra-OP anesthesia mangement and issues during anesthesia, Set expectations for post-procedure period and Allowed opportunity for questions and acknowledgement of understanding      Vitals: (Last set prior to Anesthesia Care Transfer)  CRNA VITALS  3/22/2021 1731 - 3/22/2021 1811      3/22/2021             Pulse:  109    Ht Rate:  108    SpO2:  91 %    Resp Rate (set):  10        Electronically Signed By: INDIA Schwartz CRNA  March 22, 2021  6:11 PM

## 2021-03-22 NOTE — BRIEF OP NOTE
[facility]    Brief Operative Note    Pre-operative diagnosis: Cervical spinal stenosis with myelopathy and radiculopathy. 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]  Post-operative diagnosis Same as pre-operative diagnosis    Procedure: Procedure(s):  CERVICAL 7 CORPECTOMY, CERVICAL 6-THORACIC 1 ANTERIOR SPINAL FUSION USING AN INTERVETEBRAL PROSTHESIS, LOCAL AUTOGRAFT AND INSTRUMENTATION  Surgeon: Surgeon(s) and Role:     * Regan Collier MD - Primary     * Darlene Torres PA-C - Assisting  Anesthesia: General   Estimated blood loss: * No values recorded between 3/22/2021  2:58 PM and 3/22/2021  5:58 PM *  Drains: Jason-De Souza  Specimens: * No specimens in log *  Findings:   severe cental stenosis at C6-7 and C7- T1, bilateral foraminal stenosis at C7 and C8.  Complications: None  .  Implants:   Implant Name Type Inv. Item Serial No.  Lot No. LRB No. Used Action   IMP SPACER MEDT PEEK LAT PORT VERTE-STACK [id number] Metallic Hardware/G[employer] IMP SPACER MEDT PEEK LAT PORT VERTE-STACK [id number] [employer] [id number] N/A 1 Implanted   CORNERSTONE PSR [id number] Metallic Hardware/[employer] CORNERSTONE PSR [id number] [employer] [id number] N/A 1 Implanted   SPACER PSR LATERAL PORS [id number] Metallic Hardware/[employer] SPACER PSR LATERAL PORS [id number] [employer] [id number] N/A 1 Implanted   IMP SCR CERVICAL MEDT ATLANTIS [id number] ST FA [id number] Metallic Hardware/[employer]fe[employer] IMP SCR CERVICAL MEDT ATLANTIS 4.0X13MM ST FA [id number] [employer] [id number] 42 [id number] N/A 4 Implanted   MEWDTRONIC 42 MM ATLANTIS PLATE TRANSLAXIONS [id number]   [id number] [employer] [id number] 42 [id number] N/A 1 Implanted

## 2021-03-22 NOTE — PROGRESS NOTES
SPIRITUAL [facility]  [facility]  PRE-SURGICAL VISIT    Had pre-surgical visit with patient.  Provided spiritual support and prayer.     PT requests follow-up visit following post-surgical transfer to floor.    Amos Boyce  Chaplain Resident

## 2021-03-22 NOTE — ANESTHESIA PROCEDURE NOTES
Airway       Patient location during procedure: OR       Procedure Start/Stop Times: 3/22/2021 1:59 PM  Staff -        Anesthesiologist:  Felix Marcano MD       CRNA: Pedrito Ortiz APRN CRNA       Performed By: CRNA  Consent for Airway        Urgency: elective  Indications and Patient Condition       Indications for airway management: parish-procedural       Induction type:intravenous       Mask difficulty assessment: 0 - not attempted    Final Airway Details       Final airway type: endotracheal airway       Successful airway: ETT - single  Endotracheal Airway Details        ETT size (mm): 7.0       Cuffed: yes       Cuff volume (mL): 10       Successful intubation technique: video laryngoscopy       VL Blade Size: Glidescope 3       Grade View of Cords: 1       Adjucts: stylet       Position: Right       Measured from: lips       Secured at (cm): 22       Bite block used: None    Post intubation assessment        Placement verified by: capnometry, equal breath sounds and chest rise        Number of attempts at approach: 1       Number of other approaches attempted: 0       Secured with: pink tape       Ease of procedure: easy       Dentition: Intact and Unchanged    Medication(s) Administered   Medication Administration Time: 3/22/2021 1:59 PM

## 2021-03-22 NOTE — ANESTHESIA PREPROCEDURE EVALUATION
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]   Procedure : Procedure(s):  CERVICAL 7 CORPECTOMY, CERVICAL 6-THORACIC 1 ANTERIOR SPINAL FUSION USING AN INTERVETEBRAL PROSTHESIS, LOCAL AUTOGRAFT AND INSTRUMENTATION     Past Medical History:   Diagnosis Date     Allergic rhinitis      Allergic rhinitis, cause unspecified      Asthma      Atypical chest pain      Bronchospasm      Chronic constipation      Diabetes mellitus (H)      Fluid overload      Health maintenance alteration      Hyperlipidemia      Hypoglycemia, unspecified      Obese      KRISTIN (obstructive sleep apnea)      Osteoarthrosis, unspecified whether generalized or localized, unspecified site      Sleep apnea     cpap     Uncomplicated asthma      Unspecified cataract      Uveitis      Vitamin D deficiency       Past Surgical History:   Procedure Laterality Date     APPENDECTOMY       ARTHROPLASTY KNEE Right 2018    Procedure: ARTHROPLASTY KNEE;  RIGHT TOTAL KNEE ARTHROPLASTY    EBL: 5mL;  Surgeon: Ambar Wilson MD;  Location:  OR     ARTHROPLASTY SHOULDER Right 2019    Procedure: RIGHT TOTAL SHOULDER ARTHROPLASTY REVERSE (TORNIER)^;  Surgeon: Ambar Wilson MD;  Location:  OR     BACK SURGERY       CHOLECYSTECTOMY       COLONOSCOPY       COLONOSCOPY       EYE SURGERY       GI SURGERY       GYN SURGERY      MARYJO BSO     LAMINECTOMY, FUSION LUMBAR THREE+ LEVEL, COMBINED N/A 2020    Procedure: L3,L4,L5 LAMINECTOMIES; POSTERIOR SPINAL FUSION; POSTERIOR LUMBAR INTERBODY FUSION L3-S1 USING INTERVERTEBRAL PROSTHESIS; LOCAL AUTOGRAFT AND INSTRUMENTATION.;  Surgeon: Regan Collier MD;  Location:  OR     ORTHOPEDIC SURGERY      dulce. rotator cuffs     ORTHOPEDIC SURGERY      lumbar laminectomy     ZZC NONSPECIFIC PROCEDURE      Hysterectomy fibroids - bilat S&O      Z NONSPECIFIC PROCEDURE  1998    Discectomy L5-S1     Z NONSPECIFIC PROCEDURE      Cosmetic below eyes      Allergies   Allergen Reactions     Magnesium Citrate Shortness Of Breath and Other (See Comments)     And CONFUSION     Erythromycin      nausea     Hmg-Coa-R Inhibitors Muscle Pain (Myalgia)     Other Food Allergy      prunes-gi upset     Nickel Rash      Social History     Tobacco Use     Smoking status: Former Smoker     Packs/day: 2.00     Years: 10.00     Pack years: 20.00     Types: Cigarettes     Quit date:      Years since quittin.2     Smokeless tobacco: Never Used   Substance Use Topics     Alcohol use: Yes     Comment: OCCASIONALLY--2/month      Wt Readings from Last 1 Encounters:   21 84.8 kg (187 lb)        Anesthesia Evaluation   Pt has had prior anesthetic.     No history of anesthetic complications       ROS/MED HX  ENT/Pulmonary:     (+) sleep apnea, allergic rhinitis, asthma     Neurologic:     (+) peripheral neuropathy, - B/L Legs and Right Hand.     Cardiovascular:     (+) Dyslipidemia -----    METS/Exercise Tolerance:     Hematologic:       Musculoskeletal:   (+) arthritis,     GI/Hepatic:    (-) GERD   Renal/Genitourinary:       Endo:     (+) type II DM, Not using insulin, Obesity (BMI 38),     Psychiatric/Substance Use:       Infectious Disease:       Malignancy:       Other:            Physical Exam    Airway        Mallampati: II   TM distance: > 3 FB   Neck ROM: full   Mouth opening: > 3 cm    Respiratory Devices and Support         Dental  no notable dental history         Cardiovascular   cardiovascular exam normal          Pulmonary   pulmonary exam normal                OUTSIDE LABS:  CBC:   Lab Results   Component Value Date    WBC 7.4 2021    WBC 6.7 2020    HGB 14.4 2021    HGB 11.6 (L) 2020    HCT 42.6 2021    HCT 35.2 2020     2021     2020     BMP:   Lab Results   Component Value Date      02/09/2021     02/08/2021    POTASSIUM 3.5 03/22/2021    POTASSIUM 3.3 (L) 03/16/2021    CHLORIDE 106 02/09/2021    CHLORIDE 102 02/08/2021    CO2 24 02/09/2021    CO2 25 02/08/2021    BUN 13 02/09/2021    BUN 15 02/08/2021    CR 0.54 03/22/2021    CR 0.58 03/16/2021     (H) 03/22/2021     (H) 03/16/2021     COAGS: No results found for: PTT, INR, FIBR  POC:   Lab Results   Component Value Date     (H) 03/16/2021     HEPATIC:   Lab Results   Component Value Date    ALBUMIN 3.7 02/08/2021    PROTTOTAL 7.4 02/08/2021    ALT 29 02/08/2021    AST 18 02/08/2021    ALKPHOS 97 02/08/2021    BILITOTAL 0.5 02/08/2021     OTHER:   Lab Results   Component Value Date    LACT 3.8 (H) 03/16/2021    A1C 6.6 (H) 02/08/2021    MATTI 8.7 02/09/2021    LIPASE 195 02/08/2021       Anesthesia Plan    ASA Status:  3   NPO Status:  NPO Appropriate    Anesthesia Type: General.   Induction: Intravenous.   Maintenance: Balanced.   Techniques and Equipment:     - Airway: Video-Laryngoscope     - Lines/Monitors: 2nd IV     Consents    Anesthesia Plan(s) and associated risks, benefits, and realistic alternatives discussed. Questions answered and patient/representative(s) expressed understanding.     - Discussed with:  Patient         Postoperative Care    Pain management: IV analgesics.   PONV prophylaxis: Ondansetron (or other 5HT-3)     Comments:    Propofol/Iam TIVA for neuromonitoring    Glidescope    7.0 ETT            Felix Marcano MD

## 2021-03-22 NOTE — PROGRESS NOTES
Med Scribe completed PTA meds and last doses.  ( original progress note by: Alfa Mitchell )    Prior to Admission medications    Medication Sig Last Dose Taking? Auth Provider   acetaminophen (TYLENOL) 500 MG tablet Take 1,000 mg by mouth 3 times daily (Takes with gabapentin dose) (2 X 500 mg) 3/21/2021 at 2100 Yes Reported, Patient   albuterol (PROAIR HFA/PROVENTIL HFA/VENTOLIN HFA) 108 (90 BASE) MCG/ACT Inhaler Inhale 2 puffs into the lungs 2 times daily as needed for shortness of breath / dyspnea or wheezing  3/22/2021 at 0900 Yes Reported, Patient   aspirin 81 MG EC tablet Take 81 mg by mouth every evening more than 2 weeks Yes Reported, Patient   cephALEXin (KEFLEX) 500 MG capsule Take 1,000 mg by mouth 2 times daily as needed (before and after dental procedure) (2 X 500 mg) over 1 year ago at AM Yes Reported, Patient   cholecalciferol (VITAMIN D3) 125 MCG (5000 UT) TABS tablet Take 5,000 Units by mouth daily 3/22/2021 at 0900 Yes Reported, Patient   dulaglutide (TRULICITY) 0.75 MG/0.5ML pen Inject 0.75 mg Subcutaneous every 7 days Tuesdays. 2/9/2021 at AM Yes Unknown, Entered By History   FIBER ADULT GUMMIES PO Take 2 chew tab by mouth every morning (FIBERWELL)  3/21/2021 at AM Yes Reported, Patient   fluticasone-vilanterol (BREO ELLIPTA) 100-25 MCG/INH inhaler Inhale 1 puff into the lungs every morning (last filled 11/30/2020) 3/22/2021 at 0900 Yes Reported, Patient   folic acid (FOLVITE) 400 MCG tablet Take 400 mcg by mouth every morning  3/22/2021 at 0900 Yes Reported, Patient   gabapentin (NEURONTIN) 300 MG capsule Take 900 mg by mouth 3 times daily (3 x 300mg) Patient taking differently than prescribed.  (RX states:Take 600 mg (2 X 300 mg) morning and afternoon. Take 900 mg (3 X 300) nightly at bedtime, per pharmacy) 3/22/2021 at 0900 Yes Unknown, Entered By History   Glucos-MSM-C-Vh-Hsohng-Zmkqlt (GLUCOSAMINE MSM COMPLEX) TABS tablet Take 1 tablet by mouth daily over 3 weeks ago at AM Yes Reported,  Patient   guaiFENesin (MUCINEX) 600 MG 12 hr tablet Take 600 mg by mouth every morning  3/21/2021 at AM Yes Unknown, Entered By History   lidocaine (LIDODERM) 5 % Patch Place 1 patch onto the skin daily as needed for moderate pain Removed 3/19/21 at PRN Yes Reported, Patient   loratadine (CLARITIN) 10 MG tablet Take 10 mg by mouth every morning  3/22/2021 at 0900 Yes Reported, Patient   magnesium gluconate (MAGONATE) 250 MG tablet Take 250 mg by mouth every evening 3/21/2021 at 2100 Yes Reported, Patient   meloxicam (MOBIC) 15 MG tablet Take 15 mg by mouth every morning more than 3 weeks ago Yes Reported, Patient   Menthol, Topical Analgesic, (BIOFREEZE ROLL-ON) 4 % GEL Apply topically 2 times daily as needed (pain) 3/21/2021 at pm Yes Reported, Patient   metFORMIN (GLUCOPHAGE-XR) 500 MG 24 hr tablet Take 1,000 mg by mouth 2 times daily (with meals) (2 X 500MG)     (Pt. states this in a new dose since holding her Trulicity per MD)    (pt taking differently than prescribed; RX states #270 tabs/90 day supply = 3 tabs daily) 3/21/2021 at 2100 Yes Unknown, Entered By History   mometasone (NASONEX) 50 MCG/ACT nasal spray Spray 2 sprays into both nostrils every morning  3/22/2021 at 0900 Yes Reported, Patient   montelukast (SINGULAIR) 10 MG tablet Take 10 mg by mouth At Bedtime  3/21/2021 at 2100 Yes Reported, Patient   Multiple Vitamins-Minerals (PRESERVISION AREDS 2 PO) Take 1 capsule by mouth 2 times daily 3/21/2021 at 2100 Yes Reported, Patient   pseudoePHEDrine (SUDOGEST 12 HOUR) 120 MG 12 hr tablet Take 120 mg by mouth At Bedtime  3/21/2021 at 2100 Yes Reported, Patient   senna-docusate (SENNA-PLUS) 8.6-50 MG tablet Take 2-3 tablets by mouth At Bedtime 3/20/2021 at HS Yes Reported, Patient   triamterene-HCTZ (MAXZIDE-25) 37.5-25 MG tablet Take 1 tablet by mouth every morning (last filled 2/10/2021 for 30 day supply) 3/21/2021 at AM Yes Reported, Patient         PTA medications updated by Medication Scribe prior to  surgery via phone call with patient        Comments:    Medication history sources: Patient, Surescrilaurent, H&P and Pharmacy ([facility])  Medication history source reliability: Moderate  Adherence assessment: N/A Not Observed    Significant changes made to the medication list:  Patient may be taking meds differently than prescribed; See PTA entries for:   Breo inhaler- last filled 11/30/20. Pt. has a new RX but has not filled yet, per pharmacy   Gabapentin - Pt. States takes 900 mg(3 X 300 mg) three times daily. RX states 600 mg twice daily and 900 mg  at bedtime per pharmacy,   Metformin -  Pt. states takes 1,000 mg twice daily since holding her Trulicity per MD. RX states 1,500 mg daily. Triamterene-HCTZ last filled 2/10/2021 for 30 day supply.     Patient reports no longer taking the following meds (med scribe removed from PTA med list): Alpha Lipoic Acid, Diclofenac gel      Additional medication history information:   Patient brought own home meds: Albuterol inhaler, Breo inhaler, Nasonex nasal spray and Verified medications with Pharmacy : Breo inhaler, Gabapentin, Triamterene-HCTZ 37.5-25 mg tablet        Prior to Admission medications    Medication Sig Last Dose Taking? Auth Provider   acetaminophen (TYLENOL) 500 MG tablet Take 1,000 mg by mouth 3 times daily (Takes with gabapentin dose) (2 X 500 mg)  at AM Yes Reported, Patient   albuterol (PROAIR HFA/PROVENTIL HFA/VENTOLIN HFA) 108 (90 BASE) MCG/ACT Inhaler Inhale 2 puffs into the lungs 2 times daily as needed for shortness of breath / dyspnea or wheezing   at PRN Yes Reported, Patient   cephALEXin (KEFLEX) 500 MG capsule Take 1,000 mg by mouth 2 times daily as needed (before and after dental procedure) (2 X 500 mg) over 1 year ago at AM Yes Reported, Patient   cholecalciferol (VITAMIN D3) 125 MCG (5000 UT) TABS tablet Take 5,000 Units by mouth daily  at AM Yes Reported, Patient   dulaglutide (TRULICITY) 0.75 MG/0.5ML pen Inject 0.75 mg Subcutaneous  every 7 days Tuesdays. 2/9/2021 at AM Yes Unknown, Entered By History   FIBER ADULT GUMMIES PO Take 2 chew tab by mouth every morning (FIBERWELL)   at AM Yes Reported, Patient   fluticasone-vilanterol (BREO ELLIPTA) 100-25 MCG/INH inhaler Inhale 1 puff into the lungs every morning (last filled 11/30/2020)  at AM Yes Reported, Patient   folic acid (FOLVITE) 400 MCG tablet Take 400 mcg by mouth every morning   at AM Yes Reported, Patient   gabapentin (NEURONTIN) 300 MG capsule Take 900 mg by mouth 3 times daily (3 x 300mg) Patient taking differently than prescribed.  (RX states:Take 600 mg (2 X 300 mg) morning and afternoon. Take 900 mg (3 X 300) nightly at bedtime, per pharmacy)  at AM Yes Unknown, Entered By History   Glucos-MSM-C-Xg-Suyvxs-Kafhxi (GLUCOSAMINE MSM COMPLEX) TABS tablet Take 1 tablet by mouth daily over 3 weeks ago at AM Yes Reported, Patient   guaiFENesin (MUCINEX) 600 MG 12 hr tablet Take 600 mg by mouth every morning   at AM Yes Unknown, Entered By History   lidocaine (LIDODERM) 5 % Patch Place 1 patch onto the skin daily as needed for moderate pain  at PRN Yes Reported, Patient   loratadine (CLARITIN) 10 MG tablet Take 10 mg by mouth every morning   at AM Yes Reported, Patient   magnesium gluconate (MAGONATE) 250 MG tablet Take 250 mg by mouth every evening  at PM Yes Reported, Patient   Menthol, Topical Analgesic, (BIOFREEZE ROLL-ON) 4 % GEL Apply topically 2 times daily as needed (pain)  at PRN Yes Reported, Patient   metFORMIN (GLUCOPHAGE-XR) 500 MG 24 hr tablet Take 1,000 mg by mouth 2 times daily (with meals) (2 X 500MG)     (Pt. states this in a new dose since holding her Trulicity per MD)    (pt taking differently than prescribed; RX states #270 tabs/90 day supply = 3 tabs daily)  at PM Yes Unknown, Entered By History   mometasone (NASONEX) 50 MCG/ACT nasal spray Spray 2 sprays into both nostrils every morning   at AM Yes Reported, Patient   montelukast (SINGULAIR) 10 MG tablet Take 10 mg  by mouth At Bedtime   at HS Yes Reported, Patient   Multiple Vitamins-Minerals (PRESERVISION AREDS 2 PO) Take 1 capsule by mouth 2 times daily  at HS Yes Reported, Patient   pseudoePHEDrine (SUDOGEST 12 HOUR) 120 MG 12 hr tablet Take 120 mg by mouth At Bedtime   at HS Yes Reported, Patient   senna-docusate (SENNA-PLUS) 8.6-50 MG tablet Take 2-3 tablets by mouth At Bedtime  at HS Yes Reported, Patient   triamterene-HCTZ (MAXZIDE-25) 37.5-25 MG tablet Take 1 tablet by mouth every morning (last filled 2/10/2021 for 30 day supply)  at AM Yes Reported, Patient   aspirin 81 MG EC tablet Take 81 mg by mouth every evening over 2 weeks ago at PM  Reported, Patient   meloxicam (MOBIC) 15 MG tablet Take 15 mg by mouth every morning over 3 weeks ago at AM  Reported, Patient

## 2021-03-23 ENCOUNTER — APPOINTMENT (OUTPATIENT)
Dept: PHYSICAL THERAPY | Facility: CLINIC | Age: 71
DRG: 472 | End: 2021-03-23
Attending: ORTHOPAEDIC SURGERY
Payer: MEDICARE

## 2021-03-23 LAB
ANION GAP SERPL CALCULATED.3IONS-SCNC: 6 MMOL/L (ref 3–14)
BUN SERPL-MCNC: 14 MG/DL (ref 7–30)
CALCIUM SERPL-MCNC: 7.7 MG/DL (ref 8.5–10.1)
CHLORIDE SERPL-SCNC: 104 MMOL/L (ref 94–109)
CO2 SERPL-SCNC: 26 MMOL/L (ref 20–32)
CREAT SERPL-MCNC: 0.58 MG/DL (ref 0.52–1.04)
GFR SERPL CREATININE-BSD FRML MDRD: >90 ML/MIN/{1.73_M2}
GLUCOSE BLDC GLUCOMTR-MCNC: 116 MG/DL (ref 70–99)
GLUCOSE BLDC GLUCOMTR-MCNC: 133 MG/DL (ref 70–99)
GLUCOSE BLDC GLUCOMTR-MCNC: 137 MG/DL (ref 70–99)
GLUCOSE BLDC GLUCOMTR-MCNC: 186 MG/DL (ref 70–99)
GLUCOSE SERPL-MCNC: 136 MG/DL (ref 70–99)
HGB BLD-MCNC: 12.4 G/DL (ref 11.7–15.7)
MAGNESIUM SERPL-MCNC: 2.2 MG/DL (ref 1.6–2.3)
POTASSIUM SERPL-SCNC: 3.3 MMOL/L (ref 3.4–5.3)
POTASSIUM SERPL-SCNC: 4.6 MMOL/L (ref 3.4–5.3)
SODIUM SERPL-SCNC: 136 MMOL/L (ref 133–144)

## 2021-03-23 PROCEDURE — 97112 NEUROMUSCULAR REEDUCATION: CPT | Mod: GP | Performed by: PHYSICAL THERAPIST

## 2021-03-23 PROCEDURE — 999N001017 HC STATISTIC GLUCOSE BY METER IP

## 2021-03-23 PROCEDURE — 99232 SBSQ HOSP IP/OBS MODERATE 35: CPT | Performed by: HOSPITALIST

## 2021-03-23 PROCEDURE — 97116 GAIT TRAINING THERAPY: CPT | Mod: GP | Performed by: PHYSICAL THERAPIST

## 2021-03-23 PROCEDURE — 250N000013 HC RX MED GY IP 250 OP 250 PS 637: Performed by: ORTHOPAEDIC SURGERY

## 2021-03-23 PROCEDURE — 0RT50ZZ RESECTION OF CERVICOTHORACIC VERTEBRAL DISC, OPEN APPROACH: ICD-10-PCS | Performed by: ORTHOPAEDIC SURGERY

## 2021-03-23 PROCEDURE — 0RG10A0 FUSION OF CERVICAL VERTEBRAL JOINT WITH INTERBODY FUSION DEVICE, ANTERIOR APPROACH, ANTERIOR COLUMN, OPEN APPROACH: ICD-10-PCS | Performed by: ORTHOPAEDIC SURGERY

## 2021-03-23 PROCEDURE — 84132 ASSAY OF SERUM POTASSIUM: CPT | Performed by: HOSPITALIST

## 2021-03-23 PROCEDURE — 97161 PT EVAL LOW COMPLEX 20 MIN: CPT | Mod: GP | Performed by: PHYSICAL THERAPIST

## 2021-03-23 PROCEDURE — 0RT30ZZ RESECTION OF CERVICAL VERTEBRAL DISC, OPEN APPROACH: ICD-10-PCS | Performed by: ORTHOPAEDIC SURGERY

## 2021-03-23 PROCEDURE — 97530 THERAPEUTIC ACTIVITIES: CPT | Mod: GP

## 2021-03-23 PROCEDURE — 85018 HEMOGLOBIN: CPT | Performed by: ORTHOPAEDIC SURGERY

## 2021-03-23 PROCEDURE — 36415 COLL VENOUS BLD VENIPUNCTURE: CPT | Performed by: HOSPITALIST

## 2021-03-23 PROCEDURE — 0RG40A0 FUSION OF CERVICOTHORACIC VERTEBRAL JOINT WITH INTERBODY FUSION DEVICE, ANTERIOR APPROACH, ANTERIOR COLUMN, OPEN APPROACH: ICD-10-PCS | Performed by: ORTHOPAEDIC SURGERY

## 2021-03-23 PROCEDURE — 36415 COLL VENOUS BLD VENIPUNCTURE: CPT | Performed by: ORTHOPAEDIC SURGERY

## 2021-03-23 PROCEDURE — 99207 PR CDG-MDM COMPONENT: MEETS MODERATE - UP CODED: CPT | Performed by: HOSPITALIST

## 2021-03-23 PROCEDURE — 01N10ZZ RELEASE CERVICAL NERVE, OPEN APPROACH: ICD-10-PCS | Performed by: ORTHOPAEDIC SURGERY

## 2021-03-23 PROCEDURE — 80048 BASIC METABOLIC PNL TOTAL CA: CPT | Performed by: ORTHOPAEDIC SURGERY

## 2021-03-23 PROCEDURE — 97116 GAIT TRAINING THERAPY: CPT | Mod: GP

## 2021-03-23 PROCEDURE — 83735 ASSAY OF MAGNESIUM: CPT | Performed by: ORTHOPAEDIC SURGERY

## 2021-03-23 PROCEDURE — 120N000001 HC R&B MED SURG/OB

## 2021-03-23 PROCEDURE — 250N000011 HC RX IP 250 OP 636: Performed by: ORTHOPAEDIC SURGERY

## 2021-03-23 RX ORDER — POTASSIUM CHLORIDE 1.5 G/1.58G
40 POWDER, FOR SOLUTION ORAL ONCE
Status: DISCONTINUED | OUTPATIENT
Start: 2021-03-23 | End: 2021-03-24 | Stop reason: HOSPADM

## 2021-03-23 RX ORDER — POTASSIUM CHLORIDE 1.5 G/1.58G
40 POWDER, FOR SOLUTION ORAL ONCE
Status: COMPLETED | OUTPATIENT
Start: 2021-03-23 | End: 2021-03-23

## 2021-03-23 RX ORDER — POTASSIUM CHLORIDE 1.5 G/1.58G
40 POWDER, FOR SOLUTION ORAL ONCE
Status: DISCONTINUED | OUTPATIENT
Start: 2021-03-23 | End: 2021-03-23

## 2021-03-23 RX ADMIN — OXYCODONE HYDROCHLORIDE 5 MG: 5 TABLET ORAL at 20:09

## 2021-03-23 RX ADMIN — GABAPENTIN 900 MG: 300 CAPSULE ORAL at 09:26

## 2021-03-23 RX ADMIN — ACETAMINOPHEN 1000 MG: 500 TABLET, FILM COATED ORAL at 09:25

## 2021-03-23 RX ADMIN — GUAIFENESIN 600 MG: 600 TABLET, EXTENDED RELEASE ORAL at 09:25

## 2021-03-23 RX ADMIN — TRIAMTERENE AND HYDROCHLOROTHIAZIDE 1 TABLET: 37.5; 25 TABLET ORAL at 13:09

## 2021-03-23 RX ADMIN — Medication 1 LOZENGE: at 17:05

## 2021-03-23 RX ADMIN — CHOLECALCIFEROL TAB 125 MCG (5000 UNIT) 125 MCG: 125 TAB at 09:26

## 2021-03-23 RX ADMIN — CEFAZOLIN 1 G: 1 INJECTION, POWDER, FOR SOLUTION INTRAMUSCULAR; INTRAVENOUS at 05:44

## 2021-03-23 RX ADMIN — Medication 1 LOZENGE: at 12:10

## 2021-03-23 RX ADMIN — Medication 1 CAPSULE: at 09:25

## 2021-03-23 RX ADMIN — Medication 1 CAPSULE: at 20:09

## 2021-03-23 RX ADMIN — GABAPENTIN 900 MG: 300 CAPSULE ORAL at 16:09

## 2021-03-23 RX ADMIN — FOLIC ACID TAB 400 MCG 400 MCG: 400 TAB at 09:25

## 2021-03-23 RX ADMIN — OXYCODONE HYDROCHLORIDE 5 MG: 5 TABLET ORAL at 13:54

## 2021-03-23 RX ADMIN — OXYCODONE HYDROCHLORIDE 5 MG: 5 TABLET ORAL at 08:44

## 2021-03-23 RX ADMIN — ACETAMINOPHEN 1000 MG: 500 TABLET, FILM COATED ORAL at 22:12

## 2021-03-23 RX ADMIN — MONTELUKAST 10 MG: 10 TABLET, FILM COATED ORAL at 22:12

## 2021-03-23 RX ADMIN — PSEUDOEPHEDRINE HCL 120 MG: 120 TABLET, FILM COATED, EXTENDED RELEASE ORAL at 22:12

## 2021-03-23 RX ADMIN — LORATADINE 10 MG: 10 TABLET ORAL at 09:26

## 2021-03-23 RX ADMIN — SENNOSIDES AND DOCUSATE SODIUM 2 TABLET: 8.6; 5 TABLET ORAL at 22:13

## 2021-03-23 RX ADMIN — DOCUSATE SODIUM 100 MG: 100 CAPSULE, LIQUID FILLED ORAL at 20:09

## 2021-03-23 RX ADMIN — MOMETASONE FUROATE MONOHYDRATE 2 SPRAY: 50 SPRAY, METERED NASAL at 09:33

## 2021-03-23 RX ADMIN — Medication 250 MG: at 20:09

## 2021-03-23 RX ADMIN — DOCUSATE SODIUM 100 MG: 100 CAPSULE, LIQUID FILLED ORAL at 09:28

## 2021-03-23 RX ADMIN — CALCIUM POLYCARBOPHIL 625 MG: 625 TABLET, FILM COATED ORAL at 09:26

## 2021-03-23 RX ADMIN — ACETAMINOPHEN 1000 MG: 500 TABLET, FILM COATED ORAL at 16:09

## 2021-03-23 RX ADMIN — POLYETHYLENE GLYCOL 3350 17 G: 17 POWDER, FOR SOLUTION ORAL at 13:09

## 2021-03-23 RX ADMIN — POTASSIUM CHLORIDE 40 MEQ: 1.5 POWDER, FOR SOLUTION ORAL at 09:26

## 2021-03-23 RX ADMIN — OXYCODONE HYDROCHLORIDE 5 MG: 5 TABLET ORAL at 04:33

## 2021-03-23 RX ADMIN — GABAPENTIN 900 MG: 300 CAPSULE ORAL at 22:13

## 2021-03-23 ASSESSMENT — ACTIVITIES OF DAILY LIVING (ADL)
ADLS_ACUITY_SCORE: 15
ADLS_ACUITY_SCORE: 16
ADLS_ACUITY_SCORE: 15
ADLS_ACUITY_SCORE: 16
ADLS_ACUITY_SCORE: 15
ADLS_ACUITY_SCORE: 16

## 2021-03-23 NOTE — CONSULTS
[facility]  Inpatient Consult   Hospitalist  Diony Galvan MD     Consultation Requested by Dr Collier  Reason for consultation: Diabetes management       Melissa Templeton MRN# [id number]   YOB: 1950 Age: 70 year old      Date of Admission:  3/22/2021         Assessment and Plan:   Melissa Templeton is a 70 year old morbidly obese female with PMH significant for KRISTIN (on CPAP), asthma, hypertension, chronic constipation, diabetes,h/o lumbar spinal stenosis (s/p lumbar laminectomy) who is admitted to orthopedics for elective cervical spine surgery and hospital service consulted for diabetes and medical management.    COVID status--- negative 3/19/21    Cervical spinal stenosis with myelopathy and radiculopathy  S/p C7 corpectomy, C6-T1 anterior fusion 3/22  -postop cares including pain control and DVT prophylaxis per primary orthopedics     chronic constipation  recent admission (2/8 to 2/10 for ileus)  -will have bowel regimen available    Asthma  morbid obesity with KRISTIN, on CPAP  - not on any home oxygen  - post op requiring 5 L nasal cannula oxygen; encourage incentive spirometry; wean oxygen as able  - continue PTA inhalers Breo ellipta and singular  - continue CPAP at home settings     Hypertension  -continue PTA Maxzide  -hydralazine IV PRN      diabetes mellitus  - she was recently started on Trulicity but again taken off since last admission in February  -will hold off on PTA metformin  -last HbA1c from 2/8/21 was 6.6  -blood sugar monitoring four times daily with meals and bedtime; will have an sliding scale insulin  - hypoglycemia protocol    # DVT prophylaxis: per primary orthopedics  # Code status: Full Code    Thank you for the consult, hospital service will follow along with you.           Primary Care Physician:   Dimple Grigsby         Chief Complaint:     Consultation requested by orthopedics    History is obtained from the patient         History of Present  Illness:     Melissa Templeton is a 70 year old morbidly obese female with PMH significant for KRISTIN (on CPAP), asthma, hypertension, chronic constipation, diabetes,h/o lumbar spinal stenosis (s/p lumbar laminectomy), cervical spinal stenosis  who is admitted to orthopedics for elective cervical spine surgery and hospital service consulted for diabetes and medical management. She had anterior cervical spine fusion for cervical spinal stenosis with myelopathy and radiculopathy. Postop requiring 5 L nasal cannula oxygen; apart from mild neck pain she denies any other active complaints at this time.    The patient denies any fever, chills, rigors, chest pain or shortness of breath.  Denies pain abdomen. Does report chronic constipation;  no bladder disturbances.           Past Medical History:      KRISTIN (on CPAP)  Asthma  Hypertension  chronic constipation  Diabetes  h/o lumbar spinal stenosis (s/p lumbar laminectomy)  cervical spinal stenosis           Past Surgical History:     Past Surgical History:   Procedure Laterality Date     APPENDECTOMY       ARTHROPLASTY KNEE Right 01/23/2018    Procedure: ARTHROPLASTY KNEE;  RIGHT TOTAL KNEE ARTHROPLASTY    EBL: 5mL;  Surgeon: Ambar Wilson MD;  Location:  OR     ARTHROPLASTY SHOULDER Right 06/13/2019    Procedure: RIGHT TOTAL SHOULDER ARTHROPLASTY REVERSE (TORNIER)^;  Surgeon: Ambar Wilson MD;  Location:  OR     BACK SURGERY       CHOLECYSTECTOMY       COLONOSCOPY       COLONOSCOPY       EYE SURGERY       GI SURGERY       GYN SURGERY      MARYJO BSO     LAMINECTOMY, FUSION LUMBAR THREE+ LEVEL, COMBINED N/A 01/07/2020    Procedure: L3,L4,L5 LAMINECTOMIES; POSTERIOR SPINAL FUSION; POSTERIOR LUMBAR INTERBODY FUSION L3-S1 USING INTERVERTEBRAL PROSTHESIS; LOCAL AUTOGRAFT AND INSTRUMENTATION.;  Surgeon: Regan Collier MD;  Location:  OR     ORTHOPEDIC SURGERY      dulce. rotator cuffs     ORTHOPEDIC SURGERY      lumbar laminectomy     ZZC NONSPECIFIC PROCEDURE       Hysterectomy fibroids - bilat S&O     [facility] NONSPECIFIC PROCEDURE  04/1998    Discectomy L5-S1     [facility] NONSPECIFIC PROCEDURE  1990    Cosmetic below eyes              Home Medications:     Prior to Admission Medications   Prescriptions Last Dose Informant Patient Reported? Taking?   FIBER ADULT GUMMIES PO 3/21/2021 at AM Self Yes Yes   Sig: Take 2 chew tab by mouth every morning (FIBERWELL)    Glucos-MSM-C-Ox-Ffcjsz-Lhcctk (GLUCOSAMINE MSM COMPLEX) TABS tablet over 3 weeks ago at AM Self Yes Yes   Sig: Take 1 tablet by mouth daily   Menthol, Topical Analgesic, (BIOFREEZE ROLL-ON) 4 % GEL 3/21/2021 at pm Self Yes Yes   Sig: Apply topically 2 times daily as needed (pain)   Multiple Vitamins-Minerals (PRESERVISION AREDS 2 PO) 3/21/2021 at 2100 Self Yes Yes   Sig: Take 1 capsule by mouth 2 times daily   acetaminophen (TYLENOL) 500 MG tablet 3/21/2021 at 2100 Self Yes Yes   Sig: Take 1,000 mg by mouth 3 times daily (Takes with gabapentin dose) (2 X 500 mg)   albuterol (PROAIR HFA/PROVENTIL HFA/VENTOLIN HFA) 108 (90 BASE) MCG/ACT Inhaler 3/22/2021 at 0900 Self Yes Yes   Sig: Inhale 2 puffs into the lungs 2 times daily as needed for shortness of breath / dyspnea or wheezing    aspirin 81 MG EC tablet more than 2 weeks Self Yes Yes   Sig: Take 81 mg by mouth every evening   cephALEXin (KEFLEX) 500 MG capsule over 1 year ago at AM Self Yes Yes   Sig: Take 1,000 mg by mouth 2 times daily as needed (before and after dental procedure) (2 X 500 mg)   cholecalciferol (VITAMIN D3) 125 MCG (5000 UT) TABS tablet 3/22/2021 at 0900 Self Yes Yes   Sig: Take 5,000 Units by mouth daily   dulaglutide (TRULICITY) 0.75 MG/0.5ML pen 2/9/2021 at AM Self Yes Yes   Sig: Inject 0.75 mg Subcutaneous every 7 days Tuesdays.   fluticasone-vilanterol (BREO ELLIPTA) 100-25 MCG/INH inhaler 3/22/2021 at 0900 Self Yes Yes   Sig: Inhale 1 puff into the lungs every morning (last filled 11/30/2020)   folic acid (FOLVITE) 400 MCG tablet 3/22/2021 at 0900  Self Yes Yes   Sig: Take 400 mcg by mouth every morning    gabapentin (NEURONTIN) 300 MG capsule 3/22/2021 at 0900 Self Yes Yes   Sig: Take 900 mg by mouth 3 times daily (3 x 300mg) Patient taking differently than prescribed.  (RX states:Take 600 mg (2 X 300 mg) morning and afternoon. Take 900 mg (3 X 300) nightly at bedtime, per pharmacy)   guaiFENesin (MUCINEX) 600 MG 12 hr tablet 3/21/2021 at AM Self Yes Yes   Sig: Take 600 mg by mouth every morning    lidocaine (LIDODERM) 5 % Patch Removed 3/19/21 at PRN Self Yes Yes   Sig: Place 1 patch onto the skin daily as needed for moderate pain   loratadine (CLARITIN) 10 MG tablet 3/22/2021 at 0900 Self Yes Yes   Sig: Take 10 mg by mouth every morning    magnesium gluconate (MAGONATE) 250 MG tablet 3/21/2021 at 2100 Self Yes Yes   Sig: Take 250 mg by mouth every evening   meloxicam (MOBIC) 15 MG tablet more than 3 weeks ago Self Yes Yes   Sig: Take 15 mg by mouth every morning   metFORMIN (GLUCOPHAGE-XR) 500 MG 24 hr tablet 3/21/2021 at 2100 Self Yes Yes   Sig: Take 1,000 mg by mouth 2 times daily (with meals) (2 X 500MG) Pt. states this in a new dose since holding her Trulicity per MD.  When taking Trulicty, 2 tablets in the morning and 1 in the evening; RX states #270 tabs/90 day supply = 3 tabs daily   mometasone (NASONEX) 50 MCG/ACT nasal spray 3/22/2021 at 0900 Self Yes Yes   Sig: Spray 2 sprays into both nostrils every morning    montelukast (SINGULAIR) 10 MG tablet 3/21/2021 at 2100 Self Yes Yes   Sig: Take 10 mg by mouth At Bedtime    pseudoePHEDrine (SUDOGEST 12 HOUR) 120 MG 12 hr tablet 3/21/2021 at 2100 Self Yes Yes   Sig: Take 120 mg by mouth At Bedtime    senna-docusate (SENNA-PLUS) 8.6-50 MG tablet 3/20/2021 at HS Self Yes Yes   Sig: Take 2-3 tablets by mouth At Bedtime   triamterene-HCTZ (MAXZIDE-25) 37.5-25 MG tablet 3/21/2021 at AM Self Yes Yes   Sig: Take 1 tablet by mouth every morning (last filled 2/10/2021 for 30 day supply)      Facility-Administered  "Medications: None            Allergies:     Allergies   Allergen Reactions     Magnesium Citrate Shortness Of Breath and Other (See Comments)     And CONFUSION     Erythromycin      nausea     Hmg-Coa-R Inhibitors Muscle Pain (Myalgia)     Other Food Allergy      prunes-gi upset     Nickel Rash            Social History:   Melissa Templeton  reports that she quit smoking about 30 years ago. Her smoking use included cigarettes. She has a 20.00 pack-year smoking history. She has never used smokeless tobacco. She reports current alcohol use. She reports that she does not use drugs.              Family History:   Melissa Templeton family history includes Allergies in her sister; Arthritis in her mother; C.A.D. in her sister; Cerebrovascular Disease in her father; Diabetes in her mother; Eye Disorder in her mother.    Family history was reviewed by myself and not pertinent to current presentation.           Review of Systems:   A10 point Review of Systems was done and were negative other than noted in the HPI.             Physical Exam:   Blood pressure 119/65, pulse 84, temperature 98.6  F (37  C), temperature source Oral, resp. rate 16, height 1.499 m (4' 11\"), weight 84.8 kg (187 lb), SpO2 92 %.  187 lbs 0 oz        Constitutional:  somnolent but easily unusable and conversant; orienetd X 3; lying comfortably in bed in no apparent distress   HEENT: Pupils equal and reactive to light and accomodation, EOMI intact; cervical collar and postop drain in place   Oral cavity: Moist mucosa   Cardiovascular: Normal s1 s2, regular rate and rhythm, no murmur   Lungs: B/l clear to auscultation, no wheezes or crepitations   Abdomen: Soft, nt, nd, no guarding, rigidity or rebound; BS +   LE : No edema   Musculoskeletal: Power 5/5 in all extremities   Neuro: No focal neurological deficits noted, CN II to XII grossly intact   Psychiatry: normal mood and affect  Skin: No obvious skin rashes or ulcers             Data:   All new lab and " imaging data was reviewed in Epic.   Significant labs and imagings include:    Available labs include potassium 3.5, creatinine 0.54, blood glucose 110               Diony Galvan MD  Hospitalist

## 2021-03-23 NOTE — PLAN OF CARE
OT: Orders received. Chart reviewed and discussed with care team.  OT not indicated due to pt moving well and near baseline for ADLs.  Defer discharge recommendations to care team.  Will complete orders.

## 2021-03-23 NOTE — ANESTHESIA POSTPROCEDURE EVALUATION
Patient: Melissa Templeton    Procedure(s):  CERVICAL 7 CORPECTOMY, CERVICAL 6-THORACIC 1 ANTERIOR SPINAL FUSION USING AN INTERVETEBRAL PROSTHESIS, LOCAL AUTOGRAFT AND INSTRUMENTATION    Diagnosis: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]  Diagnosis Additional Information: No value filed.    Anesthesia Type:  General    Note:     Postop Pain Control: Uneventful            Sign Out: Well controlled pain   PONV: No   Neuro/Psych: Uneventful            Sign Out: Acceptable/Baseline neuro status   Airway/Respiratory: Uneventful            Sign Out: Acceptable/Baseline resp. status   CV/Hemodynamics: Uneventful            Sign Out: Acceptable CV status   Other NRE: NONE   DID A NON-ROUTINE EVENT OCCUR? No         Last vitals:  Vitals:    03/1950 03/22/21 1954 03/22/21 2010   BP: 131/75 131/75 122/74   Pulse: 74 75 74   Resp: 13 11 12   Temp:      SpO2: 92% 92% 94%       Last vitals prior to Anesthesia Care Transfer:  CRNA VITALS  3/22/2021 1731 - 3/22/2021 1831      3/22/2021             NIBP:  (!) 182/102    Pulse:  109    SpO2:  94 %          Electronically Signed By: Yogi Nash MD  March 22, 2021  8:44 PM

## 2021-03-23 NOTE — PROGRESS NOTES
History:   Minimal c/o.  Tolerating liq diet, denies throat clearing or coughing with swallowing.  Numbness in right hand improved from preop.   Vitals:   B/P: 118/57, T: 98.5, P: 89, R: 16    Intake/Output Summary (Last 24 hours) at 3/23/2021 0627  Last data filed at 3/23/2021 0614  Gross per 24 hour   Intake 1400 ml   Output 470 ml   Net 930 ml          Lab Results   Component Value Date     02/09/2021     02/08/2021     01/14/2020    Lab Results   Component Value Date    CHLORIDE 106 02/09/2021    CHLORIDE 102 02/08/2021    CHLORIDE 98 01/14/2020    Lab Results   Component Value Date    BUN 13 02/09/2021    BUN 15 02/08/2021    BUN 11 01/14/2020      Lab Results   Component Value Date    POTASSIUM 3.5 03/22/2021    POTASSIUM 3.3 03/16/2021    POTASSIUM 4.2 02/09/2021    Lab Results   Component Value Date    CO2 24 02/09/2021    CO2 25 02/08/2021    CO2 25 01/14/2020    Lab Results   Component Value Date    CR 0.54 03/22/2021    CR 0.58 03/16/2021    CR 0.52 02/09/2021        Lab Results   Component Value Date    WBC 7.4 02/08/2021    WBC 6.7 01/14/2020    WBC 6.1 11/17/2008    HGB 14.4 02/08/2021    HGB 11.6 (L) 01/14/2020    HGB 11.9 01/09/2020    HCT 42.6 02/08/2021    HCT 35.2 01/14/2020    HCT 38.7 11/17/2008    MCV 92 02/08/2021    MCV 98 01/14/2020    MCV 92 11/17/2008     02/08/2021     01/14/2020     11/17/2008        Results for orders placed or performed during the hospital encounter of 03/22/21 (from the past 24 hour(s))   Glucose   Result Value Ref Range    Glucose 176 (H) 70 - 99 mg/dL   Creatinine   Result Value Ref Range    Creatinine 0.54 0.52 - 1.04 mg/dL    GFR Estimate >90 >60 mL/min/[1.73_m2]    GFR Estimate If Black >90 >60 mL/min/[1.73_m2]   Potassium   Result Value Ref Range    Potassium 3.5 3.4 - 5.3 mmol/L   ABO/Rh type and screen   Result Value Ref Range    ABO A     RH(D) Pos     Antibody Screen Neg     Test Valid Only At [facility]  Hospital        Specimen Expires 03/25/2021    Glucose by meter   Result Value Ref Range    Glucose 109 (H) 70 - 99 mg/dL   XR Cervical Spine Port 1 View    Narrative    XR PORTABLE CERVICAL SPINE ONE VIEW   3/22/2021 3:25 PM     HISTORY: C7 corpectomy, C6-T1 Fusion    COMPARISON: None.      Impression    IMPRESSION: Anterior marker at the level of the C6-C7 disc. Refer to  operative report for additional details.    FELIPA CALDERON MD   Glucose by meter   Result Value Ref Range    Glucose 111 (H) 70 - 99 mg/dL   XR Cervical Spine Port 1 View    Narrative    XR CERVICAL SPINE PORT 1 VW   3/22/2021 5:39 PM     HISTORY: CERVICAL6-THORACIC1 ANTERIOR SPINAL FUSION, CERVICAL7  CORPECTOMY    COMPARISON: Same day x-rays at 14:57      Impression    IMPRESSION: Postoperative changes of C6-T1 anterior fusion and C7  corpectomy. Refer to operative report for additional details.    FELIPA CALDERON MD   Hospitalist IP Consult: Patient to be seen: Routine - within 24 hours; Hospitalist Consult; Consultant may enter orders: Yes; Requesting provider? Attending physician    Diony Stewart MD     3/22/2021  9:44 PM  [facility]  Inpatient Consult   Hospitalist  Diony Galvan MD     Consultation Requested by Dr Collier  Reason for consultation: Diabetes management       Melissa Templeton MRN# [id number]   YOB: 1950 Age: 70 year old      Date of Admission:  3/22/2021         Assessment and Plan:   Melissa Templeton is a 70 year old morbidly obese female with PMH   significant for KRISTIN (on CPAP), asthma, hypertension, chronic   constipation, diabetes,h/o lumbar spinal stenosis (s/p lumbar   laminectomy) who is admitted to orthopedics for elective cervical   spine surgery and hospital service consulted for diabetes and   medical management.    COVID status--- negative 3/19/21    Cervical spinal stenosis with myelopathy and radiculopathy  S/p C7 corpectomy, C6-T1 anterior fusion  3/22  -postop cares including pain control and DVT prophylaxis per   primary orthopedics     chronic constipation  recent admission (2/8 to 2/10 for ileus)  -will have bowel regimen available    Asthma  morbid obesity with KRISTIN, on CPAP  - not on any home oxygen  - post op requiring 5 L nasal cannula oxygen; encourage incentive   spirometry; wean oxygen as able  - continue PTA inhalers Breo ellipta and singular  - continue CPAP at home settings     Hypertension  -continue PTA Maxzide  -hydralazine IV PRN      diabetes mellitus  - she was recently started on Trulicity but again taken off since   last admission in February  -will hold off on PTA metformin  -last HbA1c from 2/8/21 was 6.6  -blood sugar monitoring four times daily with meals and bedtime;   will have an sliding scale insulin  - hypoglycemia protocol    # DVT prophylaxis: per primary orthopedics  # Code status: Full Code    Thank you for the consult, hospital service will follow along   with you.           Primary Care Physician:   Dimple Grigsby         Chief Complaint:     Consultation requested by orthopedics    History is obtained from the patient         History of Present Illness:     Melissa Templeton is a 70 year old morbidly obese female with PMH   significant for KRISTIN (on CPAP), asthma, hypertension, chronic   constipation, diabetes,h/o lumbar spinal stenosis (s/p lumbar   laminectomy), cervical spinal stenosis  who is admitted to   orthopedics for elective cervical spine surgery and hospital   service consulted for diabetes and medical management. She had   anterior cervical spine fusion for cervical spinal stenosis with   myelopathy and radiculopathy. Postop requiring 5 L nasal cannula   oxygen; apart from mild neck pain she denies any other active   complaints at this time.    The patient denies any fever, chills, rigors, chest pain or   shortness of breath.  Denies pain abdomen. Does report chronic   constipation;  no bladder  disturbances.           Past Medical History:      KRISTIN (on CPAP)  Asthma  Hypertension  chronic constipation  Diabetes  h/o lumbar spinal stenosis (s/p lumbar laminectomy)  cervical spinal stenosis           Past Surgical History:     Past Surgical History:   Procedure Laterality Date     APPENDECTOMY       ARTHROPLASTY KNEE Right 01/23/2018    Procedure: ARTHROPLASTY KNEE;  RIGHT TOTAL KNEE ARTHROPLASTY    EBL: 5mL;  Surgeon: Ambar Wilson MD;  Location:  OR     ARTHROPLASTY SHOULDER Right 06/13/2019    Procedure: RIGHT TOTAL SHOULDER ARTHROPLASTY REVERSE (TORNIER)^;    Surgeon: Ambar Wilson MD;  Location:  OR     BACK SURGERY       CHOLECYSTECTOMY       COLONOSCOPY       COLONOSCOPY       EYE SURGERY       GI SURGERY       GYN SURGERY      MARYJO BSO     LAMINECTOMY, FUSION LUMBAR THREE+ LEVEL, COMBINED N/A   01/07/2020    Procedure: L3,L4,L5 LAMINECTOMIES; POSTERIOR SPINAL FUSION;   POSTERIOR LUMBAR INTERBODY FUSION L3-S1 USING INTERVERTEBRAL   PROSTHESIS; LOCAL AUTOGRAFT AND INSTRUMENTATION.;  Surgeon: Regan Collier MD;  Location:  OR     ORTHOPEDIC SURGERY      dulce. rotator cuffs     ORTHOPEDIC SURGERY      lumbar laminectomy     [facility] NONSPECIFIC PROCEDURE      Hysterectomy fibroids - bilat S&O     [facility] NONSPECIFIC PROCEDURE  04/1998    Discectomy L5-S1     [facility] NONSPECIFIC PROCEDURE  1990    Cosmetic below eyes              Home Medications:     Prior to Admission Medications   Prescriptions Last Dose Informant Patient Reported? Taking?   FIBER ADULT GUMMIES PO 3/21/2021 at AM Self Yes Yes   Sig: Take 2 chew tab by mouth every morning (FIBERWELL)    Glucos-MSM-C-Dq-Umehbk-Gywspk (GLUCOSAMINE MSM COMPLEX) TABS   tablet over 3 weeks ago at AM Self Yes Yes   Sig: Take 1 tablet by mouth daily   Menthol, Topical Analgesic, (BIOFREEZE ROLL-ON) 4 % GEL 3/21/2021   at pm Self Yes Yes   Sig: Apply topically 2 times daily as needed (pain)   Multiple Vitamins-Minerals (PRESERVISION AREDS 2 PO)  3/21/2021 at   2100 Self Yes Yes   Sig: Take 1 capsule by mouth 2 times daily   acetaminophen (TYLENOL) 500 MG tablet 3/21/2021 at 2100 Self Yes   Yes   Sig: Take 1,000 mg by mouth 3 times daily (Takes with gabapentin   dose) (2 X 500 mg)   albuterol (PROAIR HFA/PROVENTIL HFA/VENTOLIN HFA) 108 (90 BASE)   MCG/ACT Inhaler 3/22/2021 at 0900 Self Yes Yes   Sig: Inhale 2 puffs into the lungs 2 times daily as needed for   shortness of breath / dyspnea or wheezing    aspirin 81 MG EC tablet more than 2 weeks Self Yes Yes   Sig: Take 81 mg by mouth every evening   cephALEXin (KEFLEX) 500 MG capsule over 1 year ago at AM Self Yes   Yes   Sig: Take 1,000 mg by mouth 2 times daily as needed (before and   after dental procedure) (2 X 500 mg)   cholecalciferol (VITAMIN D3) 125 MCG (5000 UT) TABS tablet   3/22/2021 at 0900 Self Yes Yes   Sig: Take 5,000 Units by mouth daily   dulaglutide (TRULICITY) 0.75 MG/0.5ML pen 2/9/2021 at AM Self Yes   Yes   Sig: Inject 0.75 mg Subcutaneous every 7 days Tuesdays.   fluticasone-vilanterol (BREO ELLIPTA) 100-25 MCG/INH inhaler   3/22/2021 at 0900 Self Yes Yes   Sig: Inhale 1 puff into the lungs every morning (last filled   11/30/2020)   folic acid (FOLVITE) 400 MCG tablet 3/22/2021 at 0900 Self Yes   Yes   Sig: Take 400 mcg by mouth every morning    gabapentin (NEURONTIN) 300 MG capsule 3/22/2021 at 0900 Self Yes   Yes   Sig: Take 900 mg by mouth 3 times daily (3 x 300mg) Patient   taking differently than prescribed.  (RX states:Take 600 mg (2 X   300 mg) morning and afternoon. Take 900 mg (3 X 300) nightly at   bedtime, per pharmacy)   guaiFENesin (MUCINEX) 600 MG 12 hr tablet 3/21/2021 at AM Self   Yes Yes   Sig: Take 600 mg by mouth every morning    lidocaine (LIDODERM) 5 % Patch Removed 3/19/21 at PRN Self Yes   Yes   Sig: Place 1 patch onto the skin daily as needed for moderate   pain   loratadine (CLARITIN) 10 MG tablet 3/22/2021 at 0900 Self Yes Yes     Sig: Take 10 mg by mouth every  morning    magnesium gluconate (MAGONATE) 250 MG tablet 3/21/2021 at 2100   Self Yes Yes   Sig: Take 250 mg by mouth every evening   meloxicam (MOBIC) 15 MG tablet more than 3 weeks ago Self Yes Yes     Sig: Take 15 mg by mouth every morning   metFORMIN (GLUCOPHAGE-XR) 500 MG 24 hr tablet 3/21/2021 at 2100   Self Yes Yes   Sig: Take 1,000 mg by mouth 2 times daily (with meals) (2 X   500MG) Pt. states this in a new dose since holding her Trulicity   per MD.  When taking Trulicty, 2 tablets in the morning and 1 in   the evening; RX states #270 tabs/90 day supply = 3 tabs daily   mometasone (NASONEX) 50 MCG/ACT nasal spray 3/22/2021 at 0900   Self Yes Yes   Sig: Spray 2 sprays into both nostrils every morning    montelukast (SINGULAIR) 10 MG tablet 3/21/2021 at 2100 Self Yes   Yes   Sig: Take 10 mg by mouth At Bedtime    pseudoePHEDrine (SUDOGEST 12 HOUR) 120 MG 12 hr tablet 3/21/2021   at 2100 Self Yes Yes   Sig: Take 120 mg by mouth At Bedtime    senna-docusate (SENNA-PLUS) 8.6-50 MG tablet 3/20/2021 at HS Self   Yes Yes   Sig: Take 2-3 tablets by mouth At Bedtime   triamterene-HCTZ (MAXZIDE-25) 37.5-25 MG tablet 3/21/2021 at AM   Self Yes Yes   Sig: Take 1 tablet by mouth every morning (last filled 2/10/2021   for 30 day supply)      Facility-Administered Medications: None            Allergies:     Allergies   Allergen Reactions     Magnesium Citrate Shortness Of Breath and Other (See Comments)     And CONFUSION     Erythromycin      nausea     Hmg-Coa-R Inhibitors Muscle Pain (Myalgia)     Other Food Allergy      prunes-gi upset     Nickel Rash            Social History:   Melissa Templeton  reports that she quit smoking about 30 years ago.   Her smoking use included cigarettes. She has a 20.00 pack-year   smoking history. She has never used smokeless tobacco. She   reports current alcohol use. She reports that she does not use   drugs.              Family History:   Melissa Templeton family history includes Allergies  "in her sister;   Arthritis in her mother; C.A.D. in her sister; Cerebrovascular   Disease in her father; Diabetes in her mother; Eye Disorder in   her mother.    Family history was reviewed by myself and not pertinent to   current presentation.           Review of Systems:   A10 point Review of Systems was done and were negative other than   noted in the HPI.             Physical Exam:   Blood pressure 119/65, pulse 84, temperature 98.6  F (37  C),   temperature source Oral, resp. rate 16, height 1.499 m (4' 11\"),   weight 84.8 kg (187 lb), SpO2 92 %.  187 lbs 0 oz        Constitutional:  somnolent but easily unusable and conversant;   orienetd X 3; lying comfortably in bed in no apparent distress   HEENT: Pupils equal and reactive to light and accomodation, EOMI   intact; cervical collar and postop drain in place   Oral cavity: Moist mucosa   Cardiovascular: Normal s1 s2, regular rate and rhythm, no murmur   Lungs: B/l clear to auscultation, no wheezes or crepitations   Abdomen: Soft, nt, nd, no guarding, rigidity or rebound; BS +   LE : No edema   Musculoskeletal: Power 5/5 in all extremities   Neuro: No focal neurological deficits noted, CN II to XII grossly   intact   Psychiatry: normal mood and affect  Skin: No obvious skin rashes or ulcers             Data:   All new lab and imaging data was reviewed in Epic.   Significant labs and imagings include:    Available labs include potassium 3.5, creatinine 0.54, blood   glucose 110               Diony Galvan MD  Hospitalist   Glucose by meter   Result Value Ref Range    Glucose 157 (H) 70 - 99 mg/dL     BITA output 15cc since MN, serosanguinous by report.  Dressing:   Dry and intact. Brace/collar intact and well fitted.   Neuro:   Motor: 4/5 in bilateral intrinsic muscles as preop otherwise 5/5 in upper extremities.   Sensation: intact      A/P:   Stable POD # 1   Mobilize today.  Probable discharge to home tomorrow.     "

## 2021-03-23 NOTE — PLAN OF CARE
Reason for Admission: C6-T1 Fusion    Cognitive/Mentation: A/Ox alert x4,forgetful  Neuros/CMS: Intact ex numbness right little finger, BLE feet numbness  VS: stable  GI: BS active, passing flatus, last BM 2 days ago, bowel meds given has HX constipation, voiding with low PVR    Pulmonary: LS clear. Wears cpap at hs and when napping.    Drains: drain d/cd this a.m, dressing CDI    Activity: Assits x 1 GB, up in chair for meals. Wears soft brace at all times.  Diet: DD3 with thin liquids. Takes pills whole.     Therapies recs: possible discharge home tomorrow      End of shift summary: potassium replaced today and now 4.6.

## 2021-03-23 NOTE — PROGRESS NOTES
"SPIRITUAL HEALTH SERVICES Progress Note  [facility]    Referral Source: PT request for follow-up on pre-surgical visit.    PT (\"Nas\")expressed some feelings of anxiety about spouse's surgery on Friday after she returns home, but indicates that children will be available for support for as long as needed.    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    PT indicates that she welcomes visits from  but was tired following surgery yesterday.    PT is aware of the availability of  services by request. Follow-up with PT while she remains in hospital.    Amos Boyce  Chaplain Resident   "

## 2021-03-23 NOTE — PLAN OF CARE
Pt arrived from PACU after C6-t1 fusion. PT alert & oriented x4. CMS except pt reports some tingling in bilateral feet. Brace in place, dressing intact. BITA draining. PT reports pain 1-2, received prn oxycodone. VSS on 5L. Pt using home CPAP. PT was up to the bedside commode and voided.   Pt greenlight on stoplight aggression tool.   PT belongings, cellphone, tablet, CPAP, chargers, and clothes with pt upon arrival.

## 2021-03-23 NOTE — PROGRESS NOTES
"   03/23/21 0837   Quick Adds   Type of Visit Initial PT Evaluation   Living Environment   People in home spouse   Current Living Arrangements house   Home Accessibility stairs to enter home;stairs within home   Number of Stairs, Main Entrance 3   Stair Railings, Main Entrance none   Number of Stairs, Within Home, Primary   (Sravanthi and her bedroom is on main level. )   Transportation Anticipated car, drives self   Living Environment Comments Independent prior to surgery without AD.    Self-Care   Usual Activity Tolerance good   Activity/Exercise/Self-Care Comment \"I don't like walking.\"   Disability/Function   Wear Glasses or Blind yes   Vision Management glasses   Fall history within last six months no   General Information   Onset of Illness/Injury or Date of Surgery 03/22/21   Referring Physician Regan Collier MD   Patient/Family Therapy Goals Statement (PT) To go home tomorrow. Reports her  isn't retired but he can be home when she needs him to be. Her  is having umbilical hernia surgery on Friday though.    Pertinent History of Current Problem (include personal factors and/or comorbidities that impact the POC) Per chart, patient is S/p C7 corpectomy, C6-T1 anterior fusion 3/22   General Observations Had CPAP pm when therapist entered. O2 sat 90-92% throughout session when checked.    Cognition   Orientation Status (Cognition) oriented x 4   Affect/Mental Status (Cognition) WFL   Pain Assessment   Patient Currently in Pain Yes, see Vital Sign flowsheet  (Rates a 2, given pain meds just prior to eval. )   Integumentary/Edema   Integumentary/Edema Comments Blood in hair on left, IV leaking-nurse fixed.    Posture    Posture Comments Good with static standing but tends to flex slightly at hips with gait.    Range of Motion (ROM)   ROM Comment Cervical range limited by surgery and soft collar.    Strength   Strength Comments Right DF 2+/5, left 3/5, bilateral hip flex 3+/5, bilateral quads " 5/5, bilater hip abductors grossly 2+/5 as tested in standing and functionally.    Bed Mobility   Comment (Bed Mobility) Sup to sit with supervision with some extra time and difficulty.    Transfers   Transfer Safety Comments Sit to stand from bed and chair with supervision.    Gait/Stairs (Locomotion)   Distance in Feet (Required for LE Total Joints) 10   Pattern (Gait) swing-through   Deviations/Abnormal Patterns (Gait) base of support, wide;gait speed decreased;other (see comments)  (Excessive lateral trunk weight shift. )   Comment (Gait/Stairs) Gait 10 feet in room with excessive trunk weight shift and instability but no micha LOB.    Balance   Balance Comments Good static standing balance. Impaired dynamic standing balance. rhomberg-Unable, marching-needs mod assist for balance, SLS-Needs mod assist to prevent LOB.    Clinical Impression   Criteria for Skilled Therapeutic Intervention yes, treatment indicated   PT Diagnosis (PT) Impaired balance and gait.    Influenced by the following impairments Bilateral DF weakness (R>L), Bilateral hip weakness, trunk quickly fatigues.    Functional limitations due to impairments Needs assist for gait.    Clinical Presentation Stable/Uncomplicated   Clinical Presentation Rationale < 3 factors affecting mobilty   Clinical Decision Making (Complexity) moderate complexity   Therapy Frequency (PT) 2x/day   Predicted Duration of Therapy Intervention (days/wks) 2 days   Planned Therapy Interventions (PT) balance training;bed mobility training;gait training;stair training;transfer training   Anticipated Equipment Needs at Discharge (PT)   (Has a ww at home if needed. )   Risk & Benefits of therapy have been explained evaluation/treatment results reviewed;care plan/treatment goals reviewed;participants included;patient   PT Discharge Planning    PT Discharge Recommendation (DC Rec) home with assist;home with outpatient physical therapy   PT Rationale for DC Rec Anticipate by time  of discharge patient will be independent with bed mobility, transfers and amb on level surface. Anticipate patient needing min assist on steps to enter home without a rail. Patient reports  will be able to assist.  does work but pateint reports he can be home if she needs him to be. Recommend OPPT to address bilateral hip weakness, back weakness which significantly impair her balance.    PT Brief overview of current status  Up with CGA and transfer belt.    Total Evaluation Time   Total Evaluation Time (Minutes) 12

## 2021-03-23 NOTE — OP NOTE
Procedure Date: 03/22/2021      PREOPERATIVE DIAGNOSIS:  Cervical spondylosis with spinal stenosis and myelopathy and bilateral C8 radiculopathy.      POSTOPERATIVE DIAGNOSIS:  Cervical spondylosis with spinal stenosis and myelopathy and bilateral C8 radiculopathy.      PROCEDURE:  C7 corpectomy, C6 to T1 anterior spinal fusion using intervertebral prosthesis, local autograft and instrumentation.      SURGEON:  Regan Collier MD      ASSISTANT:  Darlene Barrientos PA-C      ANESTHESIA:  General with endotracheal tube.      INDICATIONS FOR PROCEDURE:  Ms. Travis is a 70-year-old woman who has a long history of spinal problems.  Thirteen months ago, she had lumbar decompression and fusion for multilevel disk degeneration, spondylolisthesis, spinal stenosis and neurogenic claudication.  She did well following that surgery but then developed weakness in her hands and an unusual gait that appeared to be myelopathic.  MRI imaging of her cervical spine showed severe multilevel disk degeneration with degenerative spondylolisthesis at the C7-T1 level.  She had severe spinal stenosis at both C6-C7 and C7-T1 and bilateral foraminal stenosis.  Her preoperative physical examination showed a gait abnormality, inability to heel and toe walk, difficulty balancing on her left foot.  She had weakness in the intrinsic muscles of her hands with atrophy of the first dorsal interosseous bilaterally and positive Froment sign, grade 4/5 strength in the hands.  Sensation was decreased to light touch globally both over the knees and on her right ring and small fingers.  Because of the neurologic deficit and the severe spinal stenosis at C6-C7, C7-T1 with bilateral C8 radiculopathy, it was recommended to her that she undergo operative treatment.      OPERATIVE PROCEDURE:  After informed consent was obtained from the patient, satisfactory general anesthesia achieved, the technician from [facility] placed his stimulating  and recording electrodes to do MEP, SSEP, and EMG monitoring throughout the procedure.  Baseline potentials showed some decrease globally in her upper extremities.  This was worse on the right than the left.  The patient then had the Yao head deleon affixed to her skull and to the operating table.  Adhesive tape was used to distract her shoulders distally.  Her pillow was removed, and she was placed on a foam donut, and the table was placed into reverse Trendelenburg to accomplish some traction.  The MEPs and SSEPs were repeated.  The MEPs were stable, but the SSEPs decreased in her right leg.  The technician checked his placement of his recording electrodes and his equipment and repeated the SSEPs, which had improved substantially.  MEPs at this time remained normal.  Her neck was prepped and draped in a sterile fashion.  After appropriate patient and site identification, a transverse incision measuring 3 cm was made 1 fingerbreadth proximal to her clavicle.  The subcutaneous dissection continued with Bovie down to the platysma muscle.  Platysma muscle was isolated along its inferior surface and divided transversely.  The dissection continued bluntly under the platysma muscle, mobilizing it off the sternocleidomastoid muscle and the sternothyroid muscle.  The interval between these muscles was divided bluntly.  The omohyoid muscle was identified and retracted proximally.  The carotid pulse was palpated, and the interval between the trachea and esophagus and carotid sheath was divided bluntly.  Precervical fascia was divided.  A 22-gauge spinal needle was placed into the C6-C7 disk space and a lateral x-ray obtained, confirming position at C6-C7.  The dissection continued subperiosteally, taking the longus colli muscles off the C6, C7 and T1 vertebral bodies.  Trimline retractors were placed under the longus muscles at the C7-T1 level.  [place] distraction pins were placed in the C6 and C7 vertebral bodies and  distraction placed across the disk.  The disk was then removed in its entirety from uncovertebral joint to uncovertebral joint all the way back to posterior longitudinal ligament with curettes and pituitaries.  The retractors were moved down to the C7-T1 level.  The distraction pin was taken out of C6 and placed in T1 and distraction placed across the C7-T1 segment.  In similar fashion, the disk was removed from uncovertebral joint to uncovertebral joint, and all the way back to the posterior longitudinal ligament.  The distraction pin was taken out of C7, placed back in C6, and distraction was placed across the C6 to T1 segment.  [place] rongeur was then used to remove the majority of the C7 vertebral body.  All the bone removed with the decompression was morcellized and saved for later use as graft.  When the majority of the vertebral body had been removed, the Midas drill was brought in to thin the remaining lamina posteriorly and to widen the corpectomy out to line up with the uncovertebral joints bilaterally.  Entry was gained into the spinal canal by teasing a nerve hook through the posterior longitudinal ligament at the C6-C7 level, then a micro Kerrison rongeur was used to remove the posterior longitudinal ligament around the perimeter of the corpectomy defect.  Foraminotomies were performed bilaterally at C7 and C8, and the posterior superior corner of the T1 vertebral body was chamfered, as well as the posterior inferior corner of the C7 vertebral body.  After the decompression, a nerve hook could be passed along the path of the C7 and C8 nerves bilaterally, and there was no evidence of any further tethering material.  The corpectomy defect was measured with a caliper.  A 25 x 14 x 11 mm PSR implant was assembled on the back table, filled with the local autograft and placed into the corpectomy defect.  The distraction was taken off the defect, capturing the implant.  A 42.5 mm Atlantis T-plate was then  selected and placed onto the C6 and T1 vertebral bodies using 13 mm screws.  Another lateral x-ray was then obtained, showing good position of the intervertebral prosthesis endplate at the C6 to T1 level.  The distal portion of the plate could not be well visualized because of artifact from the shoulders.  The locking mechanism on the screws was deployed.  The wound was irrigated with copious amounts of antibiotic solution.  There was additional bone graft left over that was placed lateral to the plate.  A 10-[place] BITA drain was placed lateral to the intervertebral prosthesis in the corpectomy defect. A 10-[place] BITA drain was placed along the anterior border of the spine and brought out inferior to the incision.  The platysma muscle was then reapproximated with multiple simple sutures of 2-0 Vicryl.  The skin was closed with a running subcuticular stitch of 3-0 Vicryl.  Sterile Steri-Strips and dressing were applied.  The BITA was hooked to its suction bulb.  The patient was placed into a soft cervical collar.  The Yao head gear was removed.  The patient was awakened, extubated, and left the operating room in good condition.  Estimated blood loss was 75 mL.  Replacement was 1800 mL of crystalloid, and there were no complications.  Total time for the procedure from skin to skin was 150 minutes.      KADIE Cortez's assistance was essential during the entire procedure for safe retraction of visceral and neural tissue, suctioning of blood for visualization and holding alignment during placement of the implants and bone graft.     DEJUAN MIX MD             D: 2021   T: 2021   MT: MULUGETA      Name:     LATONIA MARSH   MRN:      [id number]        Account:        [id number]   :      1950           Procedure Date: 2021      Document: [id number]

## 2021-03-23 NOTE — PROGRESS NOTES
[facility]  Hospitalist Progress Note        Dioyn Galvan MD   03/23/2021        Interval History:      - no acute issues overnight; feel better; drain out  - weaned off of oxygen         Assessment and Plan:        Melissa Templeton is a 70 year old morbidly obese female with PMH significant for KRISTIN (on CPAP), asthma, hypertension, chronic constipation, diabetes,h/o lumbar spinal stenosis (s/p lumbar laminectomy) who is admitted to orthopedics for elective cervical spine surgery and hospital service consulted for diabetes and medical management.     COVID status--- negative 3/19/21     Cervical spinal stenosis with myelopathy and radiculopathy  S/p C7 corpectomy, C6-T1 anterior fusion 3/22  -postop cares including pain control and DVT prophylaxis per primary orthopedics  - drain pulled out 3/23; weaned off of oxygen  - evaluated by PT and rec home with outpatient PT    Hypokalemia  - K 3.3; will start supple protocol; will check Mg--noted 2.2     chronic constipation  recent admission (2/8 to 2/10 for ileus)  -bowel regimen available     Asthma  morbid obesity with KRISTIN, on CPAP  - not on any home oxygen  - post op was requiring 5 L nasal cannula oxygen; encourage incentive spirometry; weaned oxygen to room air  - continue PTA inhalers Breo ellipta and singular  - continue CPAP at home settings     Hypertension  - continue PTA Maxzide  - hydralazine IV PRN      diabetes mellitus  - she was recently started on Trulicity but again on hold since last admission in February  - continue to hold off on PTA metformin  -last HbA1c from 2/8/21 was 6.6  - continue blood sugar monitoring 4 times daily with meals and bedtime; sliding scale insulin and hypoglycemia protocol     # DVT prophylaxis: per primary orthopedics  # Code status: Full Code    Disposition: per orthopedics                     Physical Exam:      Blood pressure 118/57, pulse 89, temperature 98.5  F (36.9  C), temperature source Oral,  "resp. rate 16, height 1.499 m (4' 11\"), weight 84.8 kg (187 lb), SpO2 93 %.  Vitals:    03/22/21 1035   Weight: 84.8 kg (187 lb)     Vital Signs with Ranges  Temp:  [97.4  F (36.3  C)-99.9  F (37.7  C)] 98.5  F (36.9  C)  Pulse:  [] 89  Resp:  [11-22] 16  BP: (101-185)/() 118/57  SpO2:  [91 %-94 %] 93 %  I/O's Last 24 hours  I/O last 3 completed shifts:  In: 1400 [I.V.:1400]  Out: 470 [Urine:425; Drains:45]    Constitutional:  alert, awake, oriented X 3; lying comfortably in bed in no apparent distress   HEENT: Pupils equal and reactive to light and accomodation, EOMI intact; cervical collar in place; drain out   Oral cavity: Moist mucosa   Cardiovascular: Normal s1 s2, regular rate and rhythm, no murmur   Lungs: B/l clear to auscultation, no wheezes or crepitations   Abdomen: Soft, nt, nd, no guarding, rigidity or rebound; BS +   LE : No edema   Musculoskeletal: Power 5/5 in all extremities   Neuro: No focal neurological deficits noted, CN II to XII grossly intact   Psychiatry: normal mood and affect  Skin: No obvious skin rashes or ulcers                  Medications:          acetaminophen  1,000 mg Oral TID     calcium polycarbophil  625 mg Oral QAM     docusate sodium  100 mg Oral BID     fluticasone-vilanterol  1 puff Inhalation QAM     folic acid  400 mcg Oral QAM     gabapentin  900 mg Oral TID     guaiFENesin  600 mg Oral QAM     insulin aspart  1-7 Units Subcutaneous TID AC     insulin aspart  1-5 Units Subcutaneous At Bedtime     loratadine  10 mg Oral QAM     magnesium gluconate  250 mg Oral QPM     mometasone  2 spray Both Nostrils Daily     montelukast  10 mg Oral At Bedtime     multivitamin  with lutein  1 capsule Oral BID     polyethylene glycol  17 g Oral Daily     pseudoePHEDrine  120 mg Oral At Bedtime     senna-docusate  2-3 tablet Oral At Bedtime     sodium chloride (PF)  3 mL Intracatheter Q8H     triamterene-HCTZ  1 tablet Oral QAM     Vitamin D3  125 mcg Oral Daily     PRN Meds: " albuterol, sore throat lozenge, bisacodyl, glucose **OR** dextrose **OR** glucagon, hydrALAZINE, HYDROmorphone, hydrOXYzine, lidocaine 4%, lidocaine (buffered or not buffered), methocarbamol, naloxone **OR** naloxone **OR** naloxone **OR** naloxone, ondansetron **OR** ondansetron, oxyCODONE, sodium chloride (PF), sodium chloride (PF), sodium phosphate         Data:      All new lab and imaging data was reviewed.   Recent Labs   Lab Test 03/23/21  0733 02/08/21  0901 01/14/20  0650   WBC  --  7.4 6.7   HGB 12.4 14.4 11.6*   MCV  --  92 98   PLT  --  225 281      Recent Labs   Lab Test 03/23/21  0733 03/22/21  1028 03/16/21  1424 03/16/21  0613 02/09/21  0836 02/08/21  0901     --   --   --  137 136   POTASSIUM 3.3* 3.5  --  3.3* 4.2 3.4   CHLORIDE 104  --   --   --  106 102   CO2 PENDING  --   --   --  24 25   BUN PENDING  --   --   --  13 15   CR PENDING 0.54 0.58  --  0.52 0.54   ANIONGAP PENDING  --   --   --  7 9   MATTI PENDING  --   --   --  8.7 8.9   GLC PENDING 176*  --  173* 175* 133*     No lab results found.    Invalid input(s): TROP, TROPONINIES

## 2021-03-24 ENCOUNTER — APPOINTMENT (OUTPATIENT)
Dept: PHYSICAL THERAPY | Facility: CLINIC | Age: 71
DRG: 472 | End: 2021-03-24
Attending: ORTHOPAEDIC SURGERY
Payer: MEDICARE

## 2021-03-24 VITALS
WEIGHT: 187 LBS | TEMPERATURE: 98.2 F | HEIGHT: 59 IN | SYSTOLIC BLOOD PRESSURE: 113 MMHG | RESPIRATION RATE: 16 BRPM | BODY MASS INDEX: 37.7 KG/M2 | HEART RATE: 87 BPM | OXYGEN SATURATION: 93 % | DIASTOLIC BLOOD PRESSURE: 58 MMHG

## 2021-03-24 LAB
ANION GAP SERPL CALCULATED.3IONS-SCNC: 6 MMOL/L (ref 3–14)
BUN SERPL-MCNC: 11 MG/DL (ref 7–30)
CALCIUM SERPL-MCNC: 8.2 MG/DL (ref 8.5–10.1)
CHLORIDE SERPL-SCNC: 102 MMOL/L (ref 94–109)
CO2 SERPL-SCNC: 29 MMOL/L (ref 20–32)
CREAT SERPL-MCNC: 0.52 MG/DL (ref 0.52–1.04)
GFR SERPL CREATININE-BSD FRML MDRD: >90 ML/MIN/{1.73_M2}
GLUCOSE BLDC GLUCOMTR-MCNC: 113 MG/DL (ref 70–99)
GLUCOSE BLDC GLUCOMTR-MCNC: 119 MG/DL (ref 70–99)
GLUCOSE BLDC GLUCOMTR-MCNC: 134 MG/DL (ref 70–99)
GLUCOSE BLDC GLUCOMTR-MCNC: 148 MG/DL (ref 70–99)
GLUCOSE BLDC GLUCOMTR-MCNC: 176 MG/DL (ref 70–99)
GLUCOSE SERPL-MCNC: 142 MG/DL (ref 70–99)
HGB BLD-MCNC: 12.1 G/DL (ref 11.7–15.7)
POTASSIUM SERPL-SCNC: 3.6 MMOL/L (ref 3.4–5.3)
SODIUM SERPL-SCNC: 137 MMOL/L (ref 133–144)

## 2021-03-24 PROCEDURE — 80048 BASIC METABOLIC PNL TOTAL CA: CPT | Performed by: ORTHOPAEDIC SURGERY

## 2021-03-24 PROCEDURE — 97116 GAIT TRAINING THERAPY: CPT | Mod: GP

## 2021-03-24 PROCEDURE — 99232 SBSQ HOSP IP/OBS MODERATE 35: CPT | Performed by: HOSPITALIST

## 2021-03-24 PROCEDURE — 250N000013 HC RX MED GY IP 250 OP 250 PS 637: Performed by: ORTHOPAEDIC SURGERY

## 2021-03-24 PROCEDURE — 97530 THERAPEUTIC ACTIVITIES: CPT | Mod: GP

## 2021-03-24 PROCEDURE — 85018 HEMOGLOBIN: CPT | Performed by: ORTHOPAEDIC SURGERY

## 2021-03-24 PROCEDURE — 36415 COLL VENOUS BLD VENIPUNCTURE: CPT | Performed by: ORTHOPAEDIC SURGERY

## 2021-03-24 PROCEDURE — 999N001017 HC STATISTIC GLUCOSE BY METER IP

## 2021-03-24 RX ORDER — OXYCODONE HYDROCHLORIDE 5 MG/1
5 TABLET ORAL EVERY 4 HOURS PRN
Qty: 24 TABLET | Refills: 0 | Status: SHIPPED | OUTPATIENT
Start: 2021-03-24 | End: 2022-11-10

## 2021-03-24 RX ADMIN — Medication 1 LOZENGE: at 16:06

## 2021-03-24 RX ADMIN — GABAPENTIN 900 MG: 300 CAPSULE ORAL at 16:06

## 2021-03-24 RX ADMIN — HYDROXYZINE HYDROCHLORIDE 10 MG: 10 TABLET, FILM COATED ORAL at 09:04

## 2021-03-24 RX ADMIN — FOLIC ACID TAB 400 MCG 400 MCG: 400 TAB at 09:03

## 2021-03-24 RX ADMIN — CHOLECALCIFEROL TAB 125 MCG (5000 UNIT) 125 MCG: 125 TAB at 09:03

## 2021-03-24 RX ADMIN — ALBUTEROL SULFATE 2 PUFF: 90 INHALANT RESPIRATORY (INHALATION) at 09:08

## 2021-03-24 RX ADMIN — Medication 1 LOZENGE: at 07:55

## 2021-03-24 RX ADMIN — ACETAMINOPHEN 1000 MG: 500 TABLET, FILM COATED ORAL at 16:06

## 2021-03-24 RX ADMIN — OXYCODONE HYDROCHLORIDE 5 MG: 5 TABLET ORAL at 00:18

## 2021-03-24 RX ADMIN — TRIAMTERENE AND HYDROCHLOROTHIAZIDE 1 TABLET: 37.5; 25 TABLET ORAL at 09:03

## 2021-03-24 RX ADMIN — CALCIUM POLYCARBOPHIL 625 MG: 625 TABLET, FILM COATED ORAL at 09:02

## 2021-03-24 RX ADMIN — POLYETHYLENE GLYCOL 3350 17 G: 17 POWDER, FOR SOLUTION ORAL at 08:59

## 2021-03-24 RX ADMIN — DOCUSATE SODIUM 100 MG: 100 CAPSULE, LIQUID FILLED ORAL at 09:03

## 2021-03-24 RX ADMIN — GUAIFENESIN 600 MG: 600 TABLET, EXTENDED RELEASE ORAL at 09:03

## 2021-03-24 RX ADMIN — ACETAMINOPHEN 1000 MG: 500 TABLET, FILM COATED ORAL at 09:03

## 2021-03-24 RX ADMIN — LORATADINE 10 MG: 10 TABLET ORAL at 09:04

## 2021-03-24 RX ADMIN — OXYCODONE HYDROCHLORIDE 5 MG: 5 TABLET ORAL at 09:03

## 2021-03-24 RX ADMIN — OXYCODONE HYDROCHLORIDE 5 MG: 5 TABLET ORAL at 12:55

## 2021-03-24 RX ADMIN — GABAPENTIN 900 MG: 300 CAPSULE ORAL at 09:02

## 2021-03-24 RX ADMIN — MOMETASONE FUROATE MONOHYDRATE 2 SPRAY: 50 SPRAY, METERED NASAL at 09:08

## 2021-03-24 RX ADMIN — Medication 1 CAPSULE: at 09:03

## 2021-03-24 RX ADMIN — OXYCODONE HYDROCHLORIDE 5 MG: 5 TABLET ORAL at 04:25

## 2021-03-24 ASSESSMENT — ACTIVITIES OF DAILY LIVING (ADL)
ADLS_ACUITY_SCORE: 18
ADLS_ACUITY_SCORE: 16

## 2021-03-24 NOTE — PLAN OF CARE
Pt is POD2 following C6 corpectomy and C6-T1 fusion with Dr. Collier. A+Ox4. VSS on RA. CMS intact, ex numbness to RUE middle and ring finger, as well as 5th digit and N/T to bilateral lower extremities- All baseline per pt. Pt states symptoms improving since surgery. Anterior dressing is CDI. +flatus, +bowel sounds, voiding appropriately. Dd3, thin liquid diet. Up with Ax1 and GB. Soft collar in place at all times. Taking 5 mg PRN oxycodone for back pain. Wears CPAP at bedtime/for naps. [place] on the Aggression Stop Light Tool. Plan to work with PT this morning. Possible discharge home today with assist and OP PT.

## 2021-03-24 NOTE — DISCHARGE INSTRUCTIONS
INSTRUCTIONS FOR CERVICAL SURGICAL PATIENTS  DEJUAN COLLIER MD--[facility]    POSTOPERATIVE INSTRUCTIONS (AFTER SURGERY):     PATIENTS WITH A SOFT COLLAR:    1. The collar must be worn at all times for the 6 weeks after your surgery until your first follow up appointment with Dr. Collier. You may only remove it to shower however, be sure to avoid any excessive movement of your head and neck.      PATIENTS WITH A CUSTOM NECK BRACE:  1. The brace must be worn at all times for 3 months after surgery. In most cases you can remove it to shower. Dr. Collier will tell you if you cannot.     2. Check the straps on the brace daily to make sure the lines drawn on the straps are where they belong and the brace is fitting properly.    3. Regarding any questions, concerns or needed adjustments with the brace please call [facility], [phone], and speak directly to the orthotist that fit the brace. If you are having difficulty reaching them please call [facility] at [phone].    4. This brace can be cumbersome to wear we encourage you to practice wearing it with all activities BEFORE your surgery. This will allow you to get used to how it will feel and make any modifications at home or work if need be.     INCISION/WOUND CARE                1.   If you are discharged from the hospital with a dressing over your incision you may remove and replace it 2 days after leaving the hospital.     2.   If you have sutures that dissolve, the incision must be covered when showering for 5 days after surgery. On the 6th day, you may take a shower normally without covering the wound. But do not scrub the wound.     3.  The small pieces of tape over your incision are called Steri-strips, they can be   removed when they are loose or after 2 weeks.                                                                           4.    If you have staples, your incision must remain dry and covered until they are removed 2  weeks after your surgery. Please call Dr. Nando Amezquita s Care Coordinator at [phone] to make an appointment to have these removed when you have returned home from the hospital.    5. Please look at your incisions daily and call [phone] immediately if you notice any redness, swelling, drainage, or if you develop a fever greater than 101. If after hours or weekends call [phone] and speak to the on-call physician.    6. Absolutely no bathtubs, hot tubs, swimming in pools or lakes, or any submersion/soaking before the incision is completely healed (no open areas or scabs are present).    7. Due to the location of the incision and surgery you may experience swelling that can alter your swallowing if you are having any difficulty at all please contact us immediately [phone] or after hours or weekends call [phone] and speak to the on-call physician.    POST OPERATIVE ACTIVITY LIMITATIONS    1. Avoid extreme motions of your head and neck which will be limited/controlled by the collar. Prior to surgery we encourage you to stock straws for drinking and soft foods to eat post-operatively as eating/drinking will be temporarily effected by both post surgical swelling and the collar.     2. No lifting over 10 pounds (gallon of milk) above your chest or below your waist.     3. Avoid repetitive twisting or bending i.e. raking, sweeping, shoveling etc.    4. Walking stimulates the healing process. Dr. Collier wants you to accomplish a minimum of 45 minutes of sustained walking per day for exercise. You are encouraged to walk several times a day and there is no limit on how far you can walk. In the beginning you may only be able to walk 5-15 minutes at a time that is okay just do this a minimum of 4-10x/day.    5. You may remove your white stockings (thomas hose) for   hour twice a day. You may discontinue wearing the stockings when you are not spending any time in bed during the day and are walking at least  hourly.    6. You may begin driving again when you are no longer taking the narcotic pain medication and feel comfortable with being able to navigate amongst other drivers. You must also wear the collar that you have been provided while driving.    7. You may return to work when you are no longer taking the narcotic pain medication. You must observe the 10-pound lifting restriction (nothing below waist or above chest), frequent change of position from sit, stand, and walking and avoid repetitive bending and twisting. You must also wear the collar that you have been provided.    8. Advancement of physical activities will be discussed at each follow up appointment with Dr. Collier. Physical therapy, if needed, will also be discussed at each appointment.    9. Gentle sexual activity is okay. Be a passive participant.    POST OPERATIVE MEDICATIONS    1. If your surgery INCLUDED A FUSION  DO NOT take Ibuprofen, Motrin, Advil, Aleve or any other anti-inflammatory medication or aspirin products for 3 months after surgery. This also includes any medication taken for chronic inflammatory conditions i.e. Methotrexate, Remicaid, Prednisone etc. unless otherwise instructed. Only Tylenol or Tylenol based medications are okay during this time frame.    2. If your surgery DID NOT include a fusion you may resume any over-the-counter     anti-inflammatories (Advil, Ibuprofen, Naproxen etc.) 3-5 days after the surgery.    3. We recommend three 8 ounce glasses of milk daily and a daily supplement of Vitamin D 2000 i.u./day for fusion healing and bone growth.    4. Poor nutrition can lead to delayed wound healing and constipation. Good sources of lean proteins and fresh fruits and vegetables will help with this.     5. Narcotic pain medications will also cause constipation. Using over the counter stool softeners, drinking plenty of fluids, ambulation, and good nutrition can help prevent this from occurring.     If you have any questions  regarding these instructions please contact Dr. Collier at   [phone].

## 2021-03-24 NOTE — PROGRESS NOTES
[facility]  Hospitalist Progress Note        Diony Galvan MD   03/24/2021        Interval History:      - no acute issues overnight; reported some mild back pain better with ice pack         Assessment and Plan:        Melissa Templeton is a 70 year old morbidly obese female with PMH significant for KRISTIN (on CPAP), asthma, hypertension, chronic constipation, diabetes,h/o lumbar spinal stenosis (s/p lumbar laminectomy) who is admitted to orthopedics for elective cervical spine surgery and hospital service consulted for diabetes and medical management.      COVID status--- negative 3/19/21     Cervical spinal stenosis with myelopathy and radiculopathy  S/p C7 corpectomy, C6-T1 anterior fusion 3/22  - postop cares including pain control and DVT prophylaxis per primary orthopedics  - drain pulled out 3/23; weaned off of oxygen  - evaluated by PT and rec home with outpatient PT    Hypokalemia  - K 3.3---4.6; supplemented per protocol;  Mg 2.2     chronic constipation  recent admission (2/8 to 2/10 for ileus)  -bowel regimen available     Asthma  morbid obesity with KRISTIN, on CPAP  - not on any home oxygen  - post op was requiring 5 L nasal cannula oxygen; encourage incentive spirometry; weaned oxygen to room air  - continue PTA inhalers Breo ellipta and singular  - continue CPAP at home settings     Hypertension  - continue PTA Maxzide  - hydralazine IV PRN      diabetes mellitus  - she was recently started on Trulicity but again on hold since last admission in February  - continue to hold off on PTA metformin  - last HbA1c from 2/8/21 was 6.6  - continue blood sugar monitoring 4 times daily with meals and bedtime; sliding scale insulin and hypoglycemia protocol     # DVT prophylaxis: per primary orthopedics  # Code status: Full Code    Disposition: per orthopedics, medically stable                     Physical Exam:      Blood pressure 117/69, pulse 89, temperature 98.5  F (36.9  C), temperature  "source Oral, resp. rate 16, height 1.499 m (4' 11\"), weight 84.8 kg (187 lb), SpO2 92 %.  Vitals:    03/22/21 1035   Weight: 84.8 kg (187 lb)     Vital Signs with Ranges  Temp:  [98  F (36.7  C)-98.8  F (37.1  C)] 98.5  F (36.9  C)  Pulse:  [87-98] 89  Resp:  [14-16] 16  BP: (103-147)/(58-82) 117/69  SpO2:  [92 %-93 %] 92 %  I/O's Last 24 hours  I/O last 3 completed shifts:  In: 480 [P.O.:480]  Out: 200 [Urine:200]    Constitutional:  alert, awake, oriented X 3; lying comfortably in bed in no apparent distress   HEENT: Pupils equal and reactive to light and accomodation, EOMI intact; cervical collar in place   Oral cavity: Moist mucosa   Cardiovascular: Normal s1 s2, regular rate and rhythm, no murmur   Lungs: B/l clear to auscultation, no wheezes or crepitations   Abdomen: Soft, nt, nd, no guarding, rigidity or rebound; BS +   LE : No edema   Musculoskeletal: Power 5/5 in all extremities   Neuro: No focal neurological deficits noted, CN II to XII grossly intact   Psychiatry: normal mood and affect  Skin: No obvious skin rashes or ulcers                  Medications:          acetaminophen  1,000 mg Oral TID     calcium polycarbophil  625 mg Oral QAM     docusate sodium  100 mg Oral BID     fluticasone-vilanterol  1 puff Inhalation QAM     folic acid  400 mcg Oral QAM     gabapentin  900 mg Oral TID     guaiFENesin  600 mg Oral QAM     insulin aspart  1-7 Units Subcutaneous TID AC     insulin aspart  1-5 Units Subcutaneous At Bedtime     loratadine  10 mg Oral QAM     magnesium gluconate  250 mg Oral QPM     mometasone  2 spray Both Nostrils Daily     montelukast  10 mg Oral At Bedtime     multivitamin  with lutein  1 capsule Oral BID     polyethylene glycol  17 g Oral Daily     potassium chloride  40 mEq Oral or Feeding Tube Once     pseudoePHEDrine  120 mg Oral At Bedtime     senna-docusate  2-3 tablet Oral At Bedtime     sodium chloride (PF)  3 mL Intracatheter Q8H     triamterene-HCTZ  1 tablet Oral QAM     " Vitamin D3  125 mcg Oral Daily     PRN Meds: albuterol, sore throat lozenge, bisacodyl, glucose **OR** dextrose **OR** glucagon, hydrALAZINE, HYDROmorphone, hydrOXYzine, lidocaine 4%, lidocaine (buffered or not buffered), methocarbamol, naloxone **OR** naloxone **OR** naloxone **OR** naloxone, ondansetron **OR** ondansetron, oxyCODONE, sodium chloride (PF), sodium chloride (PF), sodium phosphate         Data:      All new lab and imaging data was reviewed.   Recent Labs   Lab Test 03/24/21  0748 03/23/21  0733 02/08/21  0901 01/14/20  0650   WBC  --   --  7.4 6.7   HGB 12.1 12.4 14.4 11.6*   MCV  --   --  92 98   PLT  --   --  225 281      Recent Labs   Lab Test 03/23/21  1324 03/23/21  0733 03/22/21  1028 03/16/21  1424 03/16/21  0613 02/09/21  0836 02/08/21  0901   NA  --  136  --   --   --  137 136   POTASSIUM 4.6 3.3* 3.5  --  3.3* 4.2 3.4   CHLORIDE  --  104  --   --   --  106 102   CO2  --  26  --   --   --  24 25   BUN  --  14  --   --   --  13 15   CR  --  0.58 0.54 0.58  --  0.52 0.54   ANIONGAP  --  6  --   --   --  7 9   MATTI  --  7.7*  --   --   --  8.7 8.9   GLC  --  136* 176*  --  173* 175* 133*     No lab results found.    Invalid input(s): TROP, TROPONINIES

## 2021-03-24 NOTE — PLAN OF CARE
Discharge    Patient discharged to home via  with belongings and medications.  Care plan note: A&Ox4. Ax1 GB/walker. Incisional pain managed with scheduled tylenol and oxycodone. Dressing changed. CDI. Pt. Verbalized understanding of discharge instructions.

## 2021-03-25 NOTE — PLAN OF CARE
Physical Therapy Discharge Summary    Reason for therapy discharge:    Discharged to home.    Progress towards therapy goal(s). See goals on Care Plan in [facility] electronic health record for goal details.  Goals partially met.  Barriers to achieving goals:   discharge from facility.    Therapy recommendation(s):    No further therapy is recommended.200' no AD CGA 3 LOBs laterally with stepping strategies, once with wall dependence. B trendelenburg, no noted truncal rotation, lumbar discomfort with gait. Pt edu on fall risk with gait currently, indication today for RW use for safe mobility (may also help LBP).

## 2021-04-02 NOTE — DISCHARGE SUMMARY
Admit Date:     03/22/2021   Discharge Date:     03/24/2021      DIAGNOSIS:  Cervical spondylosis with spinal stenosis, myelopathy and bilateral C8 radiculopathy.      PROCEDURE THIS ADMISSION:  C6-7 corpectomy, C6-T1 anterior spinal fusion using intervertebral prosthesis, local autograft and instrumentation.      HISTORY OF PRESENT ILLNESS:  Ms. Templeton is a 70-year-old woman who has a long history of spinal problems.  Thirteen months ago, she had lumbar decompression and fusion for multilevel disk degeneration, spondylolisthesis, spinal stenosis and neurogenic claudication.  She did well following that surgery, but then developed weakness in her hands and an unusual gait that appeared to be myelopathic.  MRI of her cervical spine showed multilevel disk degeneration with degenerative spondylolisthesis at C7-T1 level.  She had severe spinal stenosis at both C6-7 and C7-T1 and bilateral foraminal stenosis.  Her preoperative physical examination showed a gait abnormality, inability to heel and toe walk, difficulty balancing on her left foot.  She had weakness in the intrinsic muscles of her hands and atrophy of the first dorsal interosseous bilaterally and a positive Froment sign.  Strength was grade 4/5 in her hands.  Sensation was decreased to light touch globally both over the knees and on her right ring and small fingers.  Because of the neurologic deficit and severe spinal stenosis at C6-7 and C7-T1 with bilateral C8 radiculopathy, it was recommended that she undergo operative treatment.      HOSPITAL COURSE:  On the day of admission, she was taken to the operating room where she underwent the previously mentioned procedure.  She tolerated the procedure without difficulty and was returned to the regular hospital [facility]ter postoperatively.  Her postoperative course was unremarkable.  She made good progress in eating, voiding, ambulating, and was able to accomplish these activities without difficulty at the time of  discharge.        DISCHARGE MEDICATIONS:  She was discharged home on postoperative day #2 with the following medications:   1.  Acetaminophen 500 mg, 1000 mg p.o. t.i.d.   2.  Albuterol 2 puffs into the lungs 2 times a day as needed for shortness of breath.   3.  Aspirin 81 mg p.o. each day.   4.  Biofreeze 4% gel, apply topically twice a day as needed.   5.  Breo Ellipta inhaler 100/25 mcg per INH, 1 puff into lungs every morning.   6.  Cephalexin 1000 mg by mouth b.i.d. after dental procedure.     7.  Dulaglutide ejection 0.75 mg subq every 7 days on .   8.  Fiber gummies 2 tablets p.o. every morning.   9.  Folic acid 400 mcg p.o. each morning.   10.  Gabapentin 300 mg p.o. 3 times a day.   11.  Glucosamine MSM complex 1 tablet p.o. each day.   12.  Guaifenesin 600 mg p.o. each day.   13.  Lidocaine 5% patch, place 1 patch on the skin daily as needed for moderate pain.   14.  Loratadine 10 mg p.o. every morning.   15.  Magnesium gluconate 250 mg p.o. each morning.   16.  Metformin 500 mg tablets, take 1000 mg twice a day with meals.      DISCHARGE INSTRUCTIONS:  She will follow-up in my clinic 6 weeks from the time of discharge.  She was instructed to wear a soft cervical collar at all times until that followup appointment.         DEJUAN COLLIER MD             D: 2021   T: 2021   MT: [place]      Name:     LATONIA MARSH   MRN:      [id number]        Account:        [id number]   :      1950           Admit Date:     2021                                  Discharge Date: 2021      Document: [id number]       cc: Dejuan Collier MD

## 2021-05-15 ENCOUNTER — HEALTH MAINTENANCE LETTER (OUTPATIENT)
Age: 71
End: 2021-05-15

## 2021-09-04 ENCOUNTER — HEALTH MAINTENANCE LETTER (OUTPATIENT)
Age: 71
End: 2021-09-04

## 2021-12-25 ENCOUNTER — HEALTH MAINTENANCE LETTER (OUTPATIENT)
Age: 71
End: 2021-12-25

## 2022-02-19 ENCOUNTER — HEALTH MAINTENANCE LETTER (OUTPATIENT)
Age: 72
End: 2022-02-19

## 2022-04-16 ENCOUNTER — HEALTH MAINTENANCE LETTER (OUTPATIENT)
Age: 72
End: 2022-04-16

## 2022-08-06 ENCOUNTER — HEALTH MAINTENANCE LETTER (OUTPATIENT)
Age: 72
End: 2022-08-06

## 2022-09-23 ENCOUNTER — APPOINTMENT (OUTPATIENT)
Dept: URBAN - METROPOLITAN AREA CLINIC 256 | Age: 72
Setting detail: DERMATOLOGY
End: 2022-09-26

## 2022-09-23 VITALS — WEIGHT: 180 LBS | HEIGHT: 59 IN

## 2022-09-23 DIAGNOSIS — L82.0 INFLAMED SEBORRHEIC KERATOSIS: ICD-10-CM

## 2022-09-23 DIAGNOSIS — D69.2 OTHER NONTHROMBOCYTOPENIC PURPURA: ICD-10-CM

## 2022-09-23 PROCEDURE — OTHER LIQUID NITROGEN: OTHER

## 2022-09-23 PROCEDURE — 17110 DESTRUCT B9 LESION 1-14: CPT

## 2022-09-23 PROCEDURE — 99202 OFFICE O/P NEW SF 15 MIN: CPT | Mod: 25

## 2022-09-23 PROCEDURE — OTHER COUNSELING: OTHER

## 2022-09-23 PROCEDURE — OTHER MIPS QUALITY: OTHER

## 2022-09-23 ASSESSMENT — LOCATION DETAILED DESCRIPTION DERM
LOCATION DETAILED: RIGHT DISTAL POSTERIOR THIGH
LOCATION DETAILED: LEFT DISTAL DORSAL FOREARM
LOCATION DETAILED: RIGHT PROXIMAL DORSAL FOREARM

## 2022-09-23 ASSESSMENT — LOCATION SIMPLE DESCRIPTION DERM
LOCATION SIMPLE: LEFT FOREARM
LOCATION SIMPLE: RIGHT POSTERIOR THIGH
LOCATION SIMPLE: RIGHT FOREARM

## 2022-09-23 ASSESSMENT — LOCATION ZONE DERM
LOCATION ZONE: ARM
LOCATION ZONE: LEG

## 2022-09-23 NOTE — PROCEDURE: COUNSELING
Patient Specific Counseling (Will Not Stick From Patient To Patient): Discussed physical protection from sun. Discussed a long sleeve tight fitting swim shirt to protect from further sun damage.
Detail Level: Detailed

## 2022-09-23 NOTE — PROCEDURE: LIQUID NITROGEN
Add 52 Modifier (Optional): no
Detail Level: Detailed
Medical Necessity Information: It is in your best interest to select a reason for this procedure from the list below. All of these items fulfill various CMS LCD requirements except the new and changing color options.
Medical Necessity Clause: This procedure was medically necessary because the lesions that were treated were:
Show Topical Anesthesia Variable?: Yes
Number Of Freeze-Thaw Cycles: 1 freeze-thaw cycle
Consent: The patient's consent was obtained including but not limited to risks of crusting, scabbing, blistering, scarring, darker or lighter pigmentary change, recurrence, incomplete removal and infection.
Duration Of Freeze Thaw-Cycle (Seconds): 5-10
Spray Paint Text: The liquid nitrogen was applied to the skin utilizing a spray paint frosting technique.
Application Tool (Optional): Liquid Nitrogen Sprayer
Post-Care Instructions: I reviewed with the patient in detail post-care instructions. Patient is to wear sunprotection, and avoid picking at any of the treated lesions. Pt may apply Vaseline to crusted or scabbing areas.

## 2022-09-23 NOTE — PROCEDURE: MIPS QUALITY
Quality 111:Pneumonia Vaccination Status For Older Adults: Pneumococcal vaccine (PPSV23) administered on or after patient’s 60th birthday and before the end of the measurement period
Detail Level: Detailed
Quality 226: Preventive Care And Screening: Tobacco Use: Screening And Cessation Intervention: Patient screened for tobacco use and is an ex/non-smoker
Quality 110: Preventive Care And Screening: Influenza Immunization: Influenza immunization was not ordered or administered, reason not given
Quality 431: Preventive Care And Screening: Unhealthy Alcohol Use - Screening: Patient not identified as an unhealthy alcohol user when screened for unhealthy alcohol use using a systematic screening method
Quality 130: Documentation Of Current Medications In The Medical Record: Current Medications Documented

## 2022-10-22 ENCOUNTER — HEALTH MAINTENANCE LETTER (OUTPATIENT)
Age: 72
End: 2022-10-22

## 2022-11-08 ENCOUNTER — LAB (OUTPATIENT)
Dept: URGENT CARE | Facility: URGENT CARE | Age: 72
End: 2022-11-08
Payer: MEDICARE

## 2022-11-08 DIAGNOSIS — Z20.822 ENCOUNTER FOR LABORATORY TESTING FOR COVID-19 VIRUS: ICD-10-CM

## 2022-11-08 LAB — SARS-COV-2 RNA RESP QL NAA+PROBE: NEGATIVE

## 2022-11-08 PROCEDURE — U0005 INFEC AGEN DETEC AMPLI PROBE: HCPCS

## 2022-11-08 PROCEDURE — U0003 INFECTIOUS AGENT DETECTION BY NUCLEIC ACID (DNA OR RNA); SEVERE ACUTE RESPIRATORY SYNDROME CORONAVIRUS 2 (SARS-COV-2) (CORONAVIRUS DISEASE [COVID-19]), AMPLIFIED PROBE TECHNIQUE, MAKING USE OF HIGH THROUGHPUT TECHNOLOGIES AS DESCRIBED BY CMS-2020-01-R: HCPCS

## 2022-11-08 NOTE — PROGRESS NOTES
Pre-op Total Joint Patient Screening    1. Do you have a ride available to come to the hospital the day after your surgery by 8am with anticipated discharge of 11am? Y  2. What is the name of this person? Harley (spouse)  3. Do you have a  set up after surgery? Y  4. Will your  be the same person that gives you a ride home after surgery? Y  5. Have you received the Joint Replacement Guidebook? Y  6. Do you have any questions about your guidebook? N  7. Have you activated your [employer] account? Y  8. Have you signed up for MY Chart access? Y

## 2022-11-10 ENCOUNTER — ANESTHESIA EVENT (OUTPATIENT)
Dept: SURGERY | Facility: CLINIC | Age: 72
End: 2022-11-10
Payer: MEDICARE

## 2022-11-10 RX ORDER — SENNOSIDES 8.6 MG
650 CAPSULE ORAL EVERY 8 HOURS PRN
Status: ON HOLD | COMMUNITY
End: 2022-11-12

## 2022-11-10 RX ORDER — TRAMADOL HYDROCHLORIDE 50 MG/1
25 TABLET ORAL 4 TIMES DAILY
Status: ON HOLD | COMMUNITY
End: 2022-11-12

## 2022-11-10 RX ORDER — PREGABALIN 150 MG/1
1 CAPSULE ORAL 3 TIMES DAILY
COMMUNITY

## 2022-11-10 RX ORDER — FLUTICASONE PROPIONATE 50 MCG
1-2 SPRAY, SUSPENSION (ML) NASAL PRN
COMMUNITY

## 2022-11-10 RX ORDER — MELOXICAM 15 MG/1
1 TABLET ORAL DAILY
COMMUNITY

## 2022-11-10 RX ORDER — AMOXICILLIN 500 MG
1 CAPSULE ORAL DAILY
COMMUNITY

## 2022-11-10 ASSESSMENT — LIFESTYLE VARIABLES: TOBACCO_USE: 1

## 2022-11-10 NOTE — PROGRESS NOTES
PTA medications updated by Medication Scribe prior to surgery via phone call with patient (last doses completed by Nurse)     Medication history sources: Patient, Surescripts, H&P and Patient's home med list  In the past week, patient estimated taking medication this percent of the time: Greater than 90%  Adherence assessment: N/A Not Observed    Significant changes made to the medication list:  Patient reports no longer taking the following meds (med scribe removed from PTA med list): Dulaglutide, Cephalexin, Gabapentin, Glucosamine Complex, Lidocaine 5% Patch, Nasonex Nasal Spray, Oxycodone, Diclofenac Gel      Additional medication history information:   Patient was advised to bring: Flonase Nasal Spray, Albuterol Inhaler, Breo Ellipta Inhaler    Medication reconciliation completed by provider prior to medication history? No    Time spent in this activity: 50 minutes    The information provided in this note is only as accurate as the sources available at the time of update(s)    Prior to Admission medications    Medication Sig Last Dose Taking? Auth Provider Long Term End Date   acetaminophen (TYLENOL) 500 MG tablet Take 1,000 mg by mouth 3 times daily (Takes with gabapentin dose) (2 X 500 mg) 11/10/2022 at pm Yes Reported, Patient     acetaminophen (TYLENOL) 650 MG CR tablet Take 650 mg by mouth every 8 hours as needed for mild pain or fever 10/28/2022 at prn Yes Reported, Patient     albuterol (PROAIR HFA/PROVENTIL HFA/VENTOLIN HFA) 108 (90 BASE) MCG/ACT Inhaler Inhale 2 puffs into the lungs 2 times daily as needed for shortness of breath / dyspnea or wheezing   at prn Yes Reported, Patient Yes    aspirin 81 MG EC tablet Take 81 mg by mouth every evening 10/28/2022 at am Yes Reported, Patient No    cholecalciferol (VITAMIN D3) 125 MCG (5000 UT) TABS tablet Take 1 tablet by mouth daily 11/10/2022 at am Yes Reported, Patient     FIBER PO Take 1-5 tablets by mouth daily 11/10/2022 at am Yes Reported, Patient      fish oil-omega-3 fatty acids 1000 MG capsule Take 2 g by mouth daily 10/28/2022 at am Yes Reported, Patient     fluticasone (FLONASE) 50 MCG/ACT nasal spray Spray 1-2 sprays into both nostrils as needed for rhinitis or allergies  at prn Yes Reported, Patient     fluticasone-vilanterol (BREO ELLIPTA) 100-25 MCG/INH inhaler Inhale 1 puff into the lungs every morning (last filled 11/30/2020) 11/10/2022 at am Yes Reported, Patient     folic acid (FOLVITE) 400 MCG tablet Take 400 mcg by mouth every morning  11/10/2022 at am Yes Reported, Patient     guaiFENesin (MUCINEX) 600 MG 12 hr tablet Take 600 mg by mouth every morning  11/10/2022 at am Yes Unknown, Entered By History     loratadine (CLARITIN) 10 MG tablet Take 10 mg by mouth every morning  11/10/2022 at am Yes Reported, Patient     magnesium gluconate (MAGONATE) 250 MG tablet Take 250 mg by mouth every evening 11/9/2022 at pm Yes Reported, Patient     meloxicam (MOBIC) 15 MG tablet Take 15 mg by mouth daily 10/28/2022 at am Yes Reported, Patient No    Menthol (Topical Analgesic) 4 % GEL Apply topically 2 times daily as needed (pain)  at prn Yes Reported, Patient     metFORMIN (GLUCOPHAGE-XR) 500 MG 24 hr tablet Take 500 mg by mouth 2 times daily (Evening & bedtime) 11/10/2022 at pm Yes Unknown, Entered By History Yes    montelukast (SINGULAIR) 10 MG tablet Take 10 mg by mouth At Bedtime  11/10/2022 at pm Yes Reported, Patient Yes    Multiple Vitamins-Minerals (PRESERVISION AREDS 2 PO) Take 1 capsule by mouth 2 times daily 11/10/2022 at pm Yes Reported, Patient     pregabalin (LYRICA) 100 MG capsule Take 100 mg by mouth 3 times daily  at am Yes Reported, Patient Yes    Probiotic Product (PROBIOTIC PO) Take 1 capsule by mouth daily 11/10/2022 at am Yes Reported, Patient     pseudoePHEDrine (SUDAFED) 120 MG 12 hr tablet Take 120 mg by mouth At Bedtime  11/10/2022 at pm Yes Reported, Patient     senna-docusate (SENOKOT-S/PERICOLACE) 8.6-50 MG tablet Take 2-3 tablets  by mouth At Bedtime 11/9/2022 at pm Yes Reported, Patient     traMADol (ULTRAM) 50 MG tablet Take 25 mg by mouth 4 times daily (0.5 x 50 mg = 25 mg) 11/10/2022 at pm Yes Reported, Patient     triamterene-HCTZ (MAXZIDE-25) 37.5-25 MG tablet Take 1 tablet by mouth every morning (last filled 2/10/2021 for 30 day supply) 11/10/2022 at am Yes Reported, Patient No      Medication history completed by:    Seng Duffy CPhT  [facility]

## 2022-11-10 NOTE — ANESTHESIA PREPROCEDURE EVALUATION
Anesthesia Pre-Procedure Evaluation    Patient: Melissa Templeton   MRN: [id number] : 1950        Procedure : Procedure(s):  LEFT TOTAL KNEE ARTHROPLASTY          Past Medical History:   Diagnosis Date     Allergic rhinitis      Allergic rhinitis, cause unspecified      Asthma      Atypical chest pain      Bronchospasm      Chronic constipation      Diabetes mellitus (H)      Fluid overload      Health maintenance alteration      Hyperlipidemia      Hypoglycemia, unspecified      Obese      KRISTIN (obstructive sleep apnea)      Osteoarthrosis, unspecified whether generalized or localized, unspecified site      Sleep apnea     cpap     Uncomplicated asthma      Unspecified cataract      Uveitis      Vitamin D deficiency       Past Surgical History:   Procedure Laterality Date     APPENDECTOMY       ARTHROPLASTY KNEE Right 2018    Procedure: ARTHROPLASTY KNEE;  RIGHT TOTAL KNEE ARTHROPLASTY    EBL: 5mL;  Surgeon: Ambar Wilson MD;  Location:  OR     ARTHROPLASTY SHOULDER Right 2019    Procedure: RIGHT TOTAL SHOULDER ARTHROPLASTY REVERSE (TORNIER)^;  Surgeon: Ambar Wilson MD;  Location:  OR     BACK SURGERY       CHOLECYSTECTOMY       COLONOSCOPY       COLONOSCOPY       EYE SURGERY       FUSION CERVICAL ANTERIOR TWO LEVELS N/A 3/22/2021    Procedure: CERVICAL 7 CORPECTOMY, CERVICAL 6-THORACIC 1 ANTERIOR SPINAL FUSION USING AN INTERVETEBRAL PROSTHESIS, LOCAL AUTOGRAFT AND INSTRUMENTATION;  Surgeon: Regan Collier MD;  Location:  OR     GI SURGERY       GYN SURGERY      MARYJO BSO     LAMINECTOMY, FUSION LUMBAR THREE+ LEVEL, COMBINED N/A 2020    Procedure: L3,L4,L5 LAMINECTOMIES; POSTERIOR SPINAL FUSION; POSTERIOR LUMBAR INTERBODY FUSION L3-S1 USING INTERVERTEBRAL PROSTHESIS; LOCAL AUTOGRAFT AND INSTRUMENTATION.;  Surgeon: Regan Collier MD;  Location:  OR     ORTHOPEDIC SURGERY      dulce. rotator cuffs     ORTHOPEDIC SURGERY      lumbar laminectomy     ZZC NONSPECIFIC  PROCEDURE      Hysterectomy fibroids - bilat S&O     [facility] NONSPECIFIC PROCEDURE  1998    Discectomy L5-S1     [facility] NONSPECIFIC PROCEDURE      Cosmetic below eyes      Allergies   Allergen Reactions     Magnesium Citrate Shortness Of Breath and Other (See Comments)     And CONFUSION     Cephalexin      unknown     Erythromycin      nausea     Hmg-Coa-R Inhibitors Muscle Pain (Myalgia)     Other Food Allergy      prunes-gi upset     Nickel Rash      Social History     Tobacco Use     Smoking status: Former     Packs/day: 2.00     Years: 30.00     Pack years: 60.00     Types: Cigarettes     Quit date:      Years since quittin.8     Smokeless tobacco: Never   Substance Use Topics     Alcohol use: Yes     Comment: OCCASIONALLY--2/month      Wt Readings from Last 1 Encounters:   21 84.8 kg (187 lb)        Anesthesia Evaluation   Pt has had prior anesthetic.     No history of anesthetic complications       ROS/MED HX  ENT/Pulmonary:     (+) sleep apnea, uses CPAP, allergic rhinitis, tobacco use, Past use, Moderate Persistent, asthma     Neurologic: Comment: Spinal stenosis with neurogenic claudication    Cervical stenosis with myelopathy    Spinal cord stimulator in place      Cardiovascular:     (+) Dyslipidemia hypertension-----    METS/Exercise Tolerance:     Hematologic:       Musculoskeletal:   (+) arthritis,     GI/Hepatic:    (-) GERD   Renal/Genitourinary:  - neg Renal ROS     Endo:     (+) type II DM, Obesity,     Psychiatric/Substance Use:     (+) H/O chronic opiod use .     Infectious Disease:       Malignancy:       Other:            Physical Exam    Airway        Mallampati: III   TM distance: > 3 FB   Neck ROM: full   Mouth opening: > 3 cm    Respiratory Devices and Support         Dental  no notable dental history         Cardiovascular   cardiovascular exam normal          Pulmonary   pulmonary exam normal                OUTSIDE LABS:  CBC:   Lab Results   Component Value Date    WBC 7.4  02/08/2021    WBC 6.7 01/14/2020    HGB 12.1 03/24/2021    HGB 12.4 03/23/2021    HCT 42.6 02/08/2021    HCT 35.2 01/14/2020     02/08/2021     01/14/2020     BMP:   Lab Results   Component Value Date     03/24/2021     03/23/2021    POTASSIUM 3.6 03/24/2021    POTASSIUM 4.6 03/23/2021    CHLORIDE 102 03/24/2021    CHLORIDE 104 03/23/2021    CO2 29 03/24/2021    CO2 26 03/23/2021    BUN 11 03/24/2021    BUN 14 03/23/2021    CR 0.52 03/24/2021    CR 0.58 03/23/2021     (H) 03/24/2021     (H) 03/23/2021     COAGS: No results found for: PTT, INR, FIBR  POC:   Lab Results   Component Value Date     (H) 03/24/2021     HEPATIC:   Lab Results   Component Value Date    ALBUMIN 3.7 02/08/2021    PROTTOTAL 7.4 02/08/2021    ALT 29 02/08/2021    AST 18 02/08/2021    ALKPHOS 97 02/08/2021    BILITOTAL 0.5 02/08/2021     OTHER:   Lab Results   Component Value Date    LACT 3.8 (H) 03/16/2021    A1C 6.6 (H) 02/08/2021    MATTI 8.2 (L) 03/24/2021    MAG 2.2 03/23/2021    LIPASE 195 02/08/2021       Anesthesia Plan    ASA Status:  3   NPO Status:  NPO Appropriate    Anesthesia Type: General.     - Airway: LMA   Induction: Intravenous, Propofol.   Maintenance: Balanced.        Consents    Anesthesia Plan(s) and associated risks, benefits, and realistic alternatives discussed. Questions answered and patient/representative(s) expressed understanding.    - Discussed:     - Discussed with:  Patient         Postoperative Care    Pain management: Multi-modal analgesia, Peripheral nerve block (Single Shot).   PONV prophylaxis: Ondansetron (or other 5HT-3), Background Propofol Infusion     Comments:    Other Comments: Spinal cord stimulator to be turned off prior to induction            Geri Porter MD

## 2022-11-11 ENCOUNTER — HOSPITAL ENCOUNTER (OUTPATIENT)
Facility: CLINIC | Age: 72
Discharge: HOME | End: 2022-11-13
Attending: ORTHOPAEDIC SURGERY | Admitting: ORTHOPAEDIC SURGERY
Payer: MEDICARE

## 2022-11-11 ENCOUNTER — ANESTHESIA (OUTPATIENT)
Dept: SURGERY | Facility: CLINIC | Age: 72
End: 2022-11-11
Payer: MEDICARE

## 2022-11-11 ENCOUNTER — APPOINTMENT (OUTPATIENT)
Dept: PHYSICAL THERAPY | Facility: CLINIC | Age: 72
End: 2022-11-11
Attending: ORTHOPAEDIC SURGERY
Payer: MEDICARE

## 2022-11-11 DIAGNOSIS — Z96.652 TOTAL KNEE REPLACEMENT STATUS, LEFT: Primary | ICD-10-CM

## 2022-11-11 LAB
CREAT SERPL-MCNC: 0.55 MG/DL (ref 0.52–1.04)
FASTING STATUS PATIENT QL REPORTED: YES
GFR SERPL CREATININE-BSD FRML MDRD: >90 ML/MIN/1.73M2
GLUCOSE BLD-MCNC: 161 MG/DL (ref 70–99)
GLUCOSE BLDC GLUCOMTR-MCNC: 213 MG/DL (ref 70–99)
POTASSIUM BLD-SCNC: 3.2 MMOL/L (ref 3.4–5.3)

## 2022-11-11 PROCEDURE — 97116 GAIT TRAINING THERAPY: CPT | Mod: GP

## 2022-11-11 PROCEDURE — 250N000009 HC RX 250

## 2022-11-11 PROCEDURE — 258N000003 HC RX IP 258 OP 636: Performed by: PHYSICIAN ASSISTANT

## 2022-11-11 PROCEDURE — 258N000003 HC RX IP 258 OP 636: Performed by: ORTHOPAEDIC SURGERY

## 2022-11-11 PROCEDURE — 710N000009 HC RECOVERY PHASE 1, LEVEL 1, PER MIN: Performed by: ORTHOPAEDIC SURGERY

## 2022-11-11 PROCEDURE — 82962 GLUCOSE BLOOD TEST: CPT

## 2022-11-11 PROCEDURE — 250N000009 HC RX 250: Performed by: ANESTHESIOLOGY

## 2022-11-11 PROCEDURE — 272N000001 HC OR GENERAL SUPPLY STERILE: Performed by: ORTHOPAEDIC SURGERY

## 2022-11-11 PROCEDURE — 250N000013 HC RX MED GY IP 250 OP 250 PS 637: Performed by: PHYSICIAN ASSISTANT

## 2022-11-11 PROCEDURE — 250N000011 HC RX IP 250 OP 636

## 2022-11-11 PROCEDURE — C1713 ANCHOR/SCREW BN/BN,TIS/BN: HCPCS | Performed by: ORTHOPAEDIC SURGERY

## 2022-11-11 PROCEDURE — 250N000009 HC RX 250: Performed by: ORTHOPAEDIC SURGERY

## 2022-11-11 PROCEDURE — 999N000141 HC STATISTIC PRE-PROCEDURE NURSING ASSESSMENT: Performed by: ORTHOPAEDIC SURGERY

## 2022-11-11 PROCEDURE — 250N000011 HC RX IP 250 OP 636: Performed by: PHYSICIAN ASSISTANT

## 2022-11-11 PROCEDURE — 258N000003 HC RX IP 258 OP 636: Performed by: ANESTHESIOLOGY

## 2022-11-11 PROCEDURE — 370N000017 HC ANESTHESIA TECHNICAL FEE, PER MIN: Performed by: ORTHOPAEDIC SURGERY

## 2022-11-11 PROCEDURE — 250N000011 HC RX IP 250 OP 636: Performed by: ANESTHESIOLOGY

## 2022-11-11 PROCEDURE — 84132 ASSAY OF SERUM POTASSIUM: CPT | Performed by: ANESTHESIOLOGY

## 2022-11-11 PROCEDURE — 97530 THERAPEUTIC ACTIVITIES: CPT | Mod: GP

## 2022-11-11 PROCEDURE — 250N000011 HC RX IP 250 OP 636: Performed by: ORTHOPAEDIC SURGERY

## 2022-11-11 PROCEDURE — C1776 JOINT DEVICE (IMPLANTABLE): HCPCS | Performed by: ORTHOPAEDIC SURGERY

## 2022-11-11 PROCEDURE — 36415 COLL VENOUS BLD VENIPUNCTURE: CPT | Performed by: ANESTHESIOLOGY

## 2022-11-11 PROCEDURE — 250N000025 HC SEVOFLURANE, PER MIN: Performed by: ORTHOPAEDIC SURGERY

## 2022-11-11 PROCEDURE — 82565 ASSAY OF CREATININE: CPT | Performed by: ANESTHESIOLOGY

## 2022-11-11 PROCEDURE — 82947 ASSAY GLUCOSE BLOOD QUANT: CPT | Performed by: ANESTHESIOLOGY

## 2022-11-11 PROCEDURE — 258N000001 HC RX 258: Performed by: ORTHOPAEDIC SURGERY

## 2022-11-11 PROCEDURE — 97161 PT EVAL LOW COMPLEX 20 MIN: CPT | Mod: GP

## 2022-11-11 PROCEDURE — 360N000077 HC SURGERY LEVEL 4, PER MIN: Performed by: ORTHOPAEDIC SURGERY

## 2022-11-11 DEVICE — ATTUNE KNEE SYSTEM FEMORAL POSTERIOR STABILIZED NARROW SIZE 5N LEFT CEMENTED
Type: IMPLANTABLE DEVICE | Site: KNEE | Status: FUNCTIONAL
Brand: ATTUNE

## 2022-11-11 DEVICE — ATTUNE PATELLA MEDIALIZED DOME 35MM CEMENTED AOX
Type: IMPLANTABLE DEVICE | Site: KNEE | Status: FUNCTIONAL
Brand: ATTUNE

## 2022-11-11 DEVICE — BONE CEMENT SIMPLEX 1/2 DOSE 6188-1-001: Type: IMPLANTABLE DEVICE | Site: KNEE | Status: FUNCTIONAL

## 2022-11-11 DEVICE — ATTUNE KNEE SYSTEM TIBIAL BASE ROTATING PLATFORM SIZE 4 CEMENTED
Type: IMPLANTABLE DEVICE | Site: KNEE | Status: FUNCTIONAL
Brand: ATTUNE

## 2022-11-11 DEVICE — ATTUNE KNEE SYSTEM TIBIAL INSERT ROTATING PLATFORM POSTERIOR STABILIZED 5 6MM AOX
Type: IMPLANTABLE DEVICE | Site: KNEE | Status: FUNCTIONAL
Brand: ATTUNE

## 2022-11-11 RX ORDER — ACETAMINOPHEN 325 MG/1
650 TABLET ORAL EVERY 4 HOURS PRN
Qty: 100 TABLET | Refills: 0 | Status: SHIPPED | OUTPATIENT
Start: 2022-11-11 | End: 2022-11-12

## 2022-11-11 RX ORDER — OXYCODONE HYDROCHLORIDE 5 MG/1
5-10 TABLET ORAL EVERY 4 HOURS PRN
Qty: 26 TABLET | Refills: 0 | Status: SHIPPED | OUTPATIENT
Start: 2022-11-11 | End: 2022-11-12

## 2022-11-11 RX ORDER — NALOXONE HYDROCHLORIDE 0.4 MG/ML
0.2 INJECTION, SOLUTION INTRAMUSCULAR; INTRAVENOUS; SUBCUTANEOUS
Status: DISCONTINUED | OUTPATIENT
Start: 2022-11-11 | End: 2022-11-13 | Stop reason: HOSPADM

## 2022-11-11 RX ORDER — PSEUDOEPHEDRINE HCL 120 MG/1
120 TABLET, FILM COATED, EXTENDED RELEASE ORAL AT BEDTIME
Status: DISCONTINUED | OUTPATIENT
Start: 2022-11-11 | End: 2022-11-13 | Stop reason: HOSPADM

## 2022-11-11 RX ORDER — POLYETHYLENE GLYCOL 3350 17 G/17G
17 POWDER, FOR SOLUTION ORAL DAILY
Status: DISCONTINUED | OUTPATIENT
Start: 2022-11-12 | End: 2022-11-13 | Stop reason: HOSPADM

## 2022-11-11 RX ORDER — NALOXONE HYDROCHLORIDE 0.4 MG/ML
0.4 INJECTION, SOLUTION INTRAMUSCULAR; INTRAVENOUS; SUBCUTANEOUS
Status: DISCONTINUED | OUTPATIENT
Start: 2022-11-11 | End: 2022-11-13 | Stop reason: HOSPADM

## 2022-11-11 RX ORDER — DEXMEDETOMIDINE HYDROCHLORIDE 4 UG/ML
INJECTION, SOLUTION INTRAVENOUS PRN
Status: DISCONTINUED | OUTPATIENT
Start: 2022-11-11 | End: 2022-11-11

## 2022-11-11 RX ORDER — FENTANYL CITRATE 0.05 MG/ML
50 INJECTION, SOLUTION INTRAMUSCULAR; INTRAVENOUS EVERY 5 MIN PRN
Status: DISCONTINUED | OUTPATIENT
Start: 2022-11-11 | End: 2022-11-11 | Stop reason: HOSPADM

## 2022-11-11 RX ORDER — OXYCODONE HYDROCHLORIDE 5 MG/1
5 TABLET ORAL EVERY 4 HOURS PRN
Status: DISCONTINUED | OUTPATIENT
Start: 2022-11-11 | End: 2022-11-11 | Stop reason: HOSPADM

## 2022-11-11 RX ORDER — LIDOCAINE 40 MG/G
CREAM TOPICAL
Status: DISCONTINUED | OUTPATIENT
Start: 2022-11-11 | End: 2022-11-13 | Stop reason: HOSPADM

## 2022-11-11 RX ORDER — ONDANSETRON 4 MG/1
4 TABLET, ORALLY DISINTEGRATING ORAL EVERY 6 HOURS PRN
Status: DISCONTINUED | OUTPATIENT
Start: 2022-11-11 | End: 2022-11-13 | Stop reason: HOSPADM

## 2022-11-11 RX ORDER — LIDOCAINE HYDROCHLORIDE 20 MG/ML
INJECTION, SOLUTION INFILTRATION; PERINEURAL PRN
Status: DISCONTINUED | OUTPATIENT
Start: 2022-11-11 | End: 2022-11-11

## 2022-11-11 RX ORDER — FENTANYL CITRATE 50 UG/ML
INJECTION, SOLUTION INTRAMUSCULAR; INTRAVENOUS PRN
Status: DISCONTINUED | OUTPATIENT
Start: 2022-11-11 | End: 2022-11-11

## 2022-11-11 RX ORDER — PROPOFOL 10 MG/ML
INJECTION, EMULSION INTRAVENOUS CONTINUOUS PRN
Status: DISCONTINUED | OUTPATIENT
Start: 2022-11-11 | End: 2022-11-11

## 2022-11-11 RX ORDER — AMOXICILLIN 250 MG
1 CAPSULE ORAL 2 TIMES DAILY
Status: DISCONTINUED | OUTPATIENT
Start: 2022-11-11 | End: 2022-11-11

## 2022-11-11 RX ORDER — HYDROXYZINE HYDROCHLORIDE 10 MG/1
10 TABLET, FILM COATED ORAL EVERY 6 HOURS PRN
Status: DISCONTINUED | OUTPATIENT
Start: 2022-11-11 | End: 2022-11-13 | Stop reason: HOSPADM

## 2022-11-11 RX ORDER — ONDANSETRON 2 MG/ML
4 INJECTION INTRAMUSCULAR; INTRAVENOUS EVERY 6 HOURS PRN
Status: DISCONTINUED | OUTPATIENT
Start: 2022-11-11 | End: 2022-11-13 | Stop reason: HOSPADM

## 2022-11-11 RX ORDER — CEFAZOLIN SODIUM 2 G/100ML
2 INJECTION, SOLUTION INTRAVENOUS EVERY 8 HOURS
Status: COMPLETED | OUTPATIENT
Start: 2022-11-11 | End: 2022-11-12

## 2022-11-11 RX ORDER — METFORMIN HCL 500 MG
500 TABLET, EXTENDED RELEASE 24 HR ORAL 2 TIMES DAILY
Status: DISCONTINUED | OUTPATIENT
Start: 2022-11-11 | End: 2022-11-13 | Stop reason: HOSPADM

## 2022-11-11 RX ORDER — LANOLIN ALCOHOL/MO/W.PET/CERES
400 CREAM (GRAM) TOPICAL EVERY MORNING
Status: DISCONTINUED | OUTPATIENT
Start: 2022-11-11 | End: 2022-11-13 | Stop reason: HOSPADM

## 2022-11-11 RX ORDER — FLUTICASONE PROPIONATE 50 MCG
1-2 SPRAY, SUSPENSION (ML) NASAL DAILY PRN
Status: DISCONTINUED | OUTPATIENT
Start: 2022-11-11 | End: 2022-11-13 | Stop reason: HOSPADM

## 2022-11-11 RX ORDER — SODIUM CHLORIDE, SODIUM LACTATE, POTASSIUM CHLORIDE, CALCIUM CHLORIDE 600; 310; 30; 20 MG/100ML; MG/100ML; MG/100ML; MG/100ML
INJECTION, SOLUTION INTRAVENOUS CONTINUOUS
Status: DISCONTINUED | OUTPATIENT
Start: 2022-11-11 | End: 2022-11-11 | Stop reason: HOSPADM

## 2022-11-11 RX ORDER — ACETAMINOPHEN 325 MG/1
650 TABLET ORAL EVERY 4 HOURS PRN
Status: DISCONTINUED | OUTPATIENT
Start: 2022-11-14 | End: 2022-11-13 | Stop reason: HOSPADM

## 2022-11-11 RX ORDER — BISACODYL 10 MG
10 SUPPOSITORY, RECTAL RECTAL DAILY PRN
Status: DISCONTINUED | OUTPATIENT
Start: 2022-11-11 | End: 2022-11-13 | Stop reason: HOSPADM

## 2022-11-11 RX ORDER — PROCHLORPERAZINE MALEATE 5 MG
5 TABLET ORAL EVERY 6 HOURS PRN
Status: DISCONTINUED | OUTPATIENT
Start: 2022-11-11 | End: 2022-11-13 | Stop reason: HOSPADM

## 2022-11-11 RX ORDER — OXYCODONE HYDROCHLORIDE 5 MG/1
10 TABLET ORAL EVERY 4 HOURS PRN
Status: DISCONTINUED | OUTPATIENT
Start: 2022-11-11 | End: 2022-11-13 | Stop reason: HOSPADM

## 2022-11-11 RX ORDER — DEXAMETHASONE SODIUM PHOSPHATE 4 MG/ML
INJECTION, SOLUTION INTRA-ARTICULAR; INTRALESIONAL; INTRAMUSCULAR; INTRAVENOUS; SOFT TISSUE PRN
Status: DISCONTINUED | OUTPATIENT
Start: 2022-11-11 | End: 2022-11-11

## 2022-11-11 RX ORDER — MAGNESIUM AMINO ACID CHELATE 27 MG
250 TABLET ORAL EVERY EVENING
Status: DISCONTINUED | OUTPATIENT
Start: 2022-11-11 | End: 2022-11-13 | Stop reason: HOSPADM

## 2022-11-11 RX ORDER — CEFAZOLIN SODIUM/WATER 2 G/20 ML
2 SYRINGE (ML) INTRAVENOUS
Status: COMPLETED | OUTPATIENT
Start: 2022-11-11 | End: 2022-11-11

## 2022-11-11 RX ORDER — ACETAMINOPHEN 325 MG/1
975 TABLET ORAL EVERY 8 HOURS
Status: DISCONTINUED | OUTPATIENT
Start: 2022-11-11 | End: 2022-11-13 | Stop reason: HOSPADM

## 2022-11-11 RX ORDER — KETAMINE HYDROCHLORIDE 10 MG/ML
INJECTION INTRAMUSCULAR; INTRAVENOUS PRN
Status: DISCONTINUED | OUTPATIENT
Start: 2022-11-11 | End: 2022-11-11

## 2022-11-11 RX ORDER — MONTELUKAST SODIUM 10 MG/1
10 TABLET ORAL AT BEDTIME
Status: DISCONTINUED | OUTPATIENT
Start: 2022-11-11 | End: 2022-11-13 | Stop reason: HOSPADM

## 2022-11-11 RX ORDER — MELOXICAM 15 MG/1
15 TABLET ORAL DAILY
Status: DISCONTINUED | OUTPATIENT
Start: 2022-11-12 | End: 2022-11-13 | Stop reason: HOSPADM

## 2022-11-11 RX ORDER — CEFAZOLIN SODIUM/WATER 2 G/20 ML
2 SYRINGE (ML) INTRAVENOUS EVERY 8 HOURS
Status: DISCONTINUED | OUTPATIENT
Start: 2022-11-11 | End: 2022-11-11

## 2022-11-11 RX ORDER — LABETALOL HYDROCHLORIDE 5 MG/ML
10 INJECTION, SOLUTION INTRAVENOUS
Status: DISCONTINUED | OUTPATIENT
Start: 2022-11-11 | End: 2022-11-11 | Stop reason: HOSPADM

## 2022-11-11 RX ORDER — HYDROMORPHONE HCL IN WATER/PF 6 MG/30 ML
0.2 PATIENT CONTROLLED ANALGESIA SYRINGE INTRAVENOUS
Status: DISCONTINUED | OUTPATIENT
Start: 2022-11-11 | End: 2022-11-13 | Stop reason: HOSPADM

## 2022-11-11 RX ORDER — SODIUM CHLORIDE, SODIUM LACTATE, POTASSIUM CHLORIDE, CALCIUM CHLORIDE 600; 310; 30; 20 MG/100ML; MG/100ML; MG/100ML; MG/100ML
INJECTION, SOLUTION INTRAVENOUS CONTINUOUS
Status: DISCONTINUED | OUTPATIENT
Start: 2022-11-11 | End: 2022-11-13 | Stop reason: HOSPADM

## 2022-11-11 RX ORDER — HYDROMORPHONE HCL IN WATER/PF 6 MG/30 ML
0.4 PATIENT CONTROLLED ANALGESIA SYRINGE INTRAVENOUS EVERY 5 MIN PRN
Status: DISCONTINUED | OUTPATIENT
Start: 2022-11-11 | End: 2022-11-11 | Stop reason: HOSPADM

## 2022-11-11 RX ORDER — LORATADINE 10 MG/1
10 TABLET ORAL EVERY MORNING
Status: DISCONTINUED | OUTPATIENT
Start: 2022-11-11 | End: 2022-11-13 | Stop reason: HOSPADM

## 2022-11-11 RX ORDER — TRIAMTERENE/HYDROCHLOROTHIAZID 37.5-25 MG
1 TABLET ORAL EVERY MORNING
Status: DISCONTINUED | OUTPATIENT
Start: 2022-11-11 | End: 2022-11-13 | Stop reason: HOSPADM

## 2022-11-11 RX ORDER — ONDANSETRON 2 MG/ML
4 INJECTION INTRAMUSCULAR; INTRAVENOUS EVERY 30 MIN PRN
Status: DISCONTINUED | OUTPATIENT
Start: 2022-11-11 | End: 2022-11-11 | Stop reason: HOSPADM

## 2022-11-11 RX ORDER — PREGABALIN 100 MG/1
100 CAPSULE ORAL 3 TIMES DAILY
Status: DISCONTINUED | OUTPATIENT
Start: 2022-11-11 | End: 2022-11-13 | Stop reason: HOSPADM

## 2022-11-11 RX ORDER — TRANEXAMIC ACID 650 MG/1
1950 TABLET ORAL ONCE
Status: COMPLETED | OUTPATIENT
Start: 2022-11-11 | End: 2022-11-11

## 2022-11-11 RX ORDER — HYDRALAZINE HYDROCHLORIDE 20 MG/ML
2.5-5 INJECTION INTRAMUSCULAR; INTRAVENOUS EVERY 10 MIN PRN
Status: DISCONTINUED | OUTPATIENT
Start: 2022-11-11 | End: 2022-11-11 | Stop reason: HOSPADM

## 2022-11-11 RX ORDER — ONDANSETRON 4 MG/1
4 TABLET, ORALLY DISINTEGRATING ORAL EVERY 30 MIN PRN
Status: DISCONTINUED | OUTPATIENT
Start: 2022-11-11 | End: 2022-11-11 | Stop reason: HOSPADM

## 2022-11-11 RX ORDER — ALBUTEROL SULFATE 90 UG/1
2 AEROSOL, METERED RESPIRATORY (INHALATION) 2 TIMES DAILY PRN
Status: DISCONTINUED | OUTPATIENT
Start: 2022-11-11 | End: 2022-11-13 | Stop reason: HOSPADM

## 2022-11-11 RX ORDER — AMOXICILLIN 250 MG
2-3 CAPSULE ORAL AT BEDTIME
Status: DISCONTINUED | OUTPATIENT
Start: 2022-11-11 | End: 2022-11-13 | Stop reason: HOSPADM

## 2022-11-11 RX ORDER — HYDROMORPHONE HYDROCHLORIDE 1 MG/ML
INJECTION, SOLUTION INTRAMUSCULAR; INTRAVENOUS; SUBCUTANEOUS PRN
Status: DISCONTINUED | OUTPATIENT
Start: 2022-11-11 | End: 2022-11-11

## 2022-11-11 RX ORDER — AMOXICILLIN 250 MG
2-3 CAPSULE ORAL AT BEDTIME
Status: DISCONTINUED | OUTPATIENT
Start: 2022-11-11 | End: 2022-11-11

## 2022-11-11 RX ORDER — CEFAZOLIN SODIUM/WATER 2 G/20 ML
2 SYRINGE (ML) INTRAVENOUS SEE ADMIN INSTRUCTIONS
Status: DISCONTINUED | OUTPATIENT
Start: 2022-11-11 | End: 2022-11-11 | Stop reason: HOSPADM

## 2022-11-11 RX ORDER — HYDROMORPHONE HCL IN WATER/PF 6 MG/30 ML
0.4 PATIENT CONTROLLED ANALGESIA SYRINGE INTRAVENOUS
Status: DISCONTINUED | OUTPATIENT
Start: 2022-11-11 | End: 2022-11-13 | Stop reason: HOSPADM

## 2022-11-11 RX ORDER — PROPOFOL 10 MG/ML
INJECTION, EMULSION INTRAVENOUS PRN
Status: DISCONTINUED | OUTPATIENT
Start: 2022-11-11 | End: 2022-11-11

## 2022-11-11 RX ORDER — ONDANSETRON 4 MG/1
4 TABLET, ORALLY DISINTEGRATING ORAL EVERY 8 HOURS PRN
Qty: 30 TABLET | Refills: 0 | Status: SHIPPED | OUTPATIENT
Start: 2022-11-11 | End: 2023-12-19

## 2022-11-11 RX ORDER — OXYCODONE HYDROCHLORIDE 5 MG/1
5 TABLET ORAL EVERY 4 HOURS PRN
Status: DISCONTINUED | OUTPATIENT
Start: 2022-11-11 | End: 2022-11-13 | Stop reason: HOSPADM

## 2022-11-11 RX ADMIN — SENNOSIDES AND DOCUSATE SODIUM 2 TABLET: 50; 8.6 TABLET ORAL at 22:07

## 2022-11-11 RX ADMIN — SODIUM CHLORIDE, POTASSIUM CHLORIDE, SODIUM LACTATE AND CALCIUM CHLORIDE: 600; 310; 30; 20 INJECTION, SOLUTION INTRAVENOUS at 06:51

## 2022-11-11 RX ADMIN — DEXMEDETOMIDINE HYDROCHLORIDE 8 MCG: 200 INJECTION INTRAVENOUS at 07:53

## 2022-11-11 RX ADMIN — SODIUM CHLORIDE, POTASSIUM CHLORIDE, SODIUM LACTATE AND CALCIUM CHLORIDE: 600; 310; 30; 20 INJECTION, SOLUTION INTRAVENOUS at 20:14

## 2022-11-11 RX ADMIN — PROPOFOL 30 MCG/KG/MIN: 10 INJECTION, EMULSION INTRAVENOUS at 07:39

## 2022-11-11 RX ADMIN — CEFAZOLIN SODIUM 2 G: 2 INJECTION, SOLUTION INTRAVENOUS at 14:34

## 2022-11-11 RX ADMIN — MONTELUKAST 10 MG: 10 TABLET, FILM COATED ORAL at 22:07

## 2022-11-11 RX ADMIN — DEXAMETHASONE SODIUM PHOSPHATE 10 MG: 4 INJECTION, SOLUTION INTRA-ARTICULAR; INTRALESIONAL; INTRAMUSCULAR; INTRAVENOUS; SOFT TISSUE at 07:38

## 2022-11-11 RX ADMIN — METFORMIN ER 500 MG 500 MG: 500 TABLET ORAL at 20:05

## 2022-11-11 RX ADMIN — DEXMEDETOMIDINE HYDROCHLORIDE 4 MCG: 200 INJECTION INTRAVENOUS at 08:00

## 2022-11-11 RX ADMIN — TRANEXAMIC ACID 1950 MG: 650 TABLET ORAL at 06:49

## 2022-11-11 RX ADMIN — PREGABALIN 100 MG: 100 CAPSULE ORAL at 20:05

## 2022-11-11 RX ADMIN — ASPIRIN 325 MG: 325 TABLET, COATED ORAL at 20:05

## 2022-11-11 RX ADMIN — PREGABALIN 100 MG: 100 CAPSULE ORAL at 14:19

## 2022-11-11 RX ADMIN — HYDROXYZINE HYDROCHLORIDE 10 MG: 10 TABLET ORAL at 12:24

## 2022-11-11 RX ADMIN — SODIUM CHLORIDE, POTASSIUM CHLORIDE, SODIUM LACTATE AND CALCIUM CHLORIDE: 600; 310; 30; 20 INJECTION, SOLUTION INTRAVENOUS at 09:16

## 2022-11-11 RX ADMIN — ACETAMINOPHEN 975 MG: 325 TABLET, FILM COATED ORAL at 14:19

## 2022-11-11 RX ADMIN — DEXMEDETOMIDINE HYDROCHLORIDE 4 MCG: 200 INJECTION INTRAVENOUS at 08:35

## 2022-11-11 RX ADMIN — Medication 20 MG: at 07:47

## 2022-11-11 RX ADMIN — SODIUM CHLORIDE, POTASSIUM CHLORIDE, SODIUM LACTATE AND CALCIUM CHLORIDE: 600; 310; 30; 20 INJECTION, SOLUTION INTRAVENOUS at 12:02

## 2022-11-11 RX ADMIN — CEFAZOLIN SODIUM 2 G: 2 INJECTION, SOLUTION INTRAVENOUS at 23:32

## 2022-11-11 RX ADMIN — BUPIVACAINE HYDROCHLORIDE 15 ML: 5 INJECTION, SOLUTION EPIDURAL; INTRACAUDAL at 07:00

## 2022-11-11 RX ADMIN — PSEUDOEPHEDRINE HCL 120 MG: 120 TABLET, FILM COATED, EXTENDED RELEASE ORAL at 22:07

## 2022-11-11 RX ADMIN — TRIAMTERENE AND HYDROCHLOROTHIAZIDE 1 TABLET: 37.5; 25 TABLET ORAL at 12:08

## 2022-11-11 RX ADMIN — OXYCODONE HYDROCHLORIDE 10 MG: 5 TABLET ORAL at 20:04

## 2022-11-11 RX ADMIN — FENTANYL CITRATE 50 MCG: 50 INJECTION, SOLUTION INTRAMUSCULAR; INTRAVENOUS at 07:47

## 2022-11-11 RX ADMIN — FENTANYL CITRATE 50 MCG: 50 INJECTION, SOLUTION INTRAMUSCULAR; INTRAVENOUS at 07:29

## 2022-11-11 RX ADMIN — OXYCODONE HYDROCHLORIDE 10 MG: 5 TABLET ORAL at 15:45

## 2022-11-11 RX ADMIN — HYDROMORPHONE HYDROCHLORIDE 0.5 MG: 1 INJECTION, SOLUTION INTRAMUSCULAR; INTRAVENOUS; SUBCUTANEOUS at 08:31

## 2022-11-11 RX ADMIN — MIDAZOLAM 1 MG: 1 INJECTION INTRAMUSCULAR; INTRAVENOUS at 06:53

## 2022-11-11 RX ADMIN — ACETAMINOPHEN 975 MG: 325 TABLET, FILM COATED ORAL at 22:07

## 2022-11-11 RX ADMIN — Medication 2 G: at 07:28

## 2022-11-11 RX ADMIN — PROPOFOL 200 MG: 10 INJECTION, EMULSION INTRAVENOUS at 07:29

## 2022-11-11 RX ADMIN — LIDOCAINE HYDROCHLORIDE 100 MG: 20 INJECTION, SOLUTION INFILTRATION; PERINEURAL at 07:29

## 2022-11-11 ASSESSMENT — ACTIVITIES OF DAILY LIVING (ADL)
ADLS_ACUITY_SCORE: 24
ADLS_ACUITY_SCORE: 25
ADLS_ACUITY_SCORE: 38
ADLS_ACUITY_SCORE: 24

## 2022-11-11 NOTE — ANESTHESIA CARE TRANSFER NOTE
Patient: Melissa Templeton    Procedure: Procedure(s):  LEFT TOTAL KNEE ARTHROPLASTY       Diagnosis: Primary osteoarthritis of left knee [M17.12]  Diagnosis Additional Information: No value filed.    Anesthesia Type:   General     Note:    Oropharynx: oropharynx clear of all foreign objects and spontaneously breathing  Level of Consciousness: drowsy  Oxygen Supplementation: face mask  Level of Supplemental Oxygen (L/min / FiO2): 6  Independent Airway: airway patency satisfactory and stable  Dentition: dentition unchanged  Vital Signs Stable: post-procedure vital signs reviewed and stable  Report to RN Given: handoff report given  Patient transferred to: PACU  Comments: VSS and spont breathing.  Handoff Report: Identifed the Patient, Identified the Reponsible Provider, Reviewed the pertinent medical history, Discussed the surgical course, Reviewed Intra-OP anesthesia mangement and issues during anesthesia, Set expectations for post-procedure period and Allowed opportunity for questions and acknowledgement of understanding      Vitals:  Vitals Value Taken Time   /80 11/11/22 0933   Temp     Pulse 96 11/11/22 0935   Resp 16 11/11/22 0935   SpO2 93 % 11/11/22 0935   Vitals shown include unvalidated device data.    Electronically Signed By: INDIA Serrato CRNA  November 11, 2022  9:36 AM

## 2022-11-11 NOTE — ANESTHESIA PROCEDURE NOTES
Airway       Patient location during procedure: OR  Staff -        Anesthesiologist:  Geri Porter MD       CRNA: Alan Ohara APRN CRNA       Performed By: CRNA  Consent for Airway        Urgency: elective  Indications and Patient Condition       Indications for airway management: parish-procedural       Induction type:intravenous       Mask difficulty assessment: 0 - not attempted    Final Airway Details       Final airway type: supraglottic airway    Supraglottic Airway Details        Type: LMA       Brand: Ambu AuraGain       LMA size: 4    Post intubation assessment        Placement verified by: capnometry, equal breath sounds and chest rise        Number of attempts at approach: 1       Number of other approaches attempted: 0       Ease of procedure: easy       Dentition: Intact and Unchanged

## 2022-11-11 NOTE — ANESTHESIA POSTPROCEDURE EVALUATION
Patient: Melissa Templeton    Procedure: Procedure(s):  LEFT TOTAL KNEE ARTHROPLASTY       Anesthesia Type:  General    Note:  Disposition: Admission   Postop Pain Control: Uneventful            Sign Out: Well controlled pain   PONV: No   Neuro/Psych: Uneventful            Sign Out: Acceptable/Baseline neuro status   Airway/Respiratory: Uneventful            Sign Out: Acceptable/Baseline resp. status   CV/Hemodynamics: Uneventful            Sign Out: Acceptable CV status; No obvious hypovolemia; No obvious fluid overload   Other NRE: NONE   DID A NON-ROUTINE EVENT OCCUR? No           Last vitals:  Vitals Value Taken Time   /98 11/11/22 1020   Temp     Pulse 89 11/11/22 1020   Resp 26 11/11/22 1020   SpO2 92 % 11/11/22 1020   Vitals shown include unvalidated device data.    Electronically Signed By: Geri Porter MD  November 11, 2022  10:21 AM

## 2022-11-11 NOTE — BRIEF OP NOTE
[facility]    Brief Operative Note    Pre-operative diagnosis: Primary osteoarthritis of left knee [M17.12]  Post-operative diagnosis Same as pre-operative diagnosis    Procedure: Procedure(s):  LEFT TOTAL KNEE ARTHROPLASTY  Surgeon: Surgeon(s) and Role:     * Ambar Wilson MD - Primary     * Vito Garcia PA-C - Assisting  Anesthesia: General   Estimated Blood Loss: 5 mL from 11/11/2022  7:26 AM to 11/11/2022  9:29 AM      Drains: None  Specimens: * No specimens in log *  Findings:   None.  Complications: None.  Implants:   Implant Name Type Inv. Item Serial No.  Lot No. LRB No. Used Action   BONE CEMENT SIMPLEX 1/2 DOSE 6188-1-001 - [id number] Cement, Bone BONE CEMENT SIMPLEX 1/2 DOSE 6188-1-001  [employer] [id number] Left 2 Implanted   IMP PATELLA JJ ATTUNE DOME 35MM [id number] - [id number] Total Joint Component/Insert IMP PATELLA JJ ATTUNE DOME 35MM [id number]  [employer] [id number] Left 1 Implanted   IMP COMP FEM JJ ATTUNE POST STAB LT NRW MADELINE SZ5 [id number] - [id number] Total Joint Component/Insert IMP COMP FEM JJ ATTUNE POST STAB LT NRW MADELINE SZ5 [id number]  [employer] [id number] Left 1 Implanted   IMP TIB BASEPLATE JJ ATTUNE POST RP SZ 4 [id number] - [id number] Total Joint Component/Insert IMP TIB BASEPLATE JJ ATTUNE POST RP SZ 4 [id number]  [employer] [id number] Left 1 Implanted   IMP INSERT JJ ATTUNE PS RP SZ5 6MM [id number] - [id number] Total Joint Component/Insert IMP INSERT JJ ATTUNE PS RP SZ5 6MM [id number]  [employer]- [id number] Left 1 Implanted

## 2022-11-11 NOTE — OP NOTE
Procedure Date: 11/11/2022    PREOPERATIVE DIAGNOSIS:  Osteoarthritis of the left knee.    POSTOPERATIVE DIAGNOSIS:  Osteoarthritis of the left knee.    PROCEDURE PERFORMED:  Left total knee arthroplasty using the DePuy Attune knee system posterior stabilized rotating platform, size 5 narrow femur, 4 tibia, 35 mm patellar button and 6 mm of tibial polyethylene.    INDICATIONS FOR PROCEDURE:  The patient is a 72-year-old female with a long history of bilateral osteoarthritis of the knee.  She previously had right total knee arthroplasty and did very well.  I discussed treatment options and explained to her the risks, benefits, and potential complications of total knee arthroplasty.  Discussion included, but was not specific to infection, vascular, neurologic complications, DVT, septic and aseptic loosening, arthrofibrosis, fracture, and the possible need for further revision surgery.  After this discussion, the patient wanted to proceed with surgery.    ANESTHETIC USED:  General.    SURGEON:  Ambar Wilson MD    ASSISTANT:  Gareth Garcia PA-C    DESCRIPTION OF PROCEDURE:  The patient was taken to the operating room and placed on the table in supine position.  After adequate induction of general anesthetic, a bump was placed beneath the left hip.  Pneumatic tourniquet was applied to left thigh.  The patient was given 2 grams of Ancef for infection prophylaxis given 1 hour prior to incision.  We then performed sterile prep and drape of the left knee and left lower extremity.  After sterile prep and drape, limb was exsanguinated and tourniquet was elevated to 300 mmHg.  I then made a midline incision exposing the extensor mechanism, proceeded with a medial parapatellar arthrotomy in a quad sparing approach then identified my entry point for the intramedullary guided femur set at 5 degrees of valgus, made my distal femoral cut and sized the femur appropriately and applied the jig for 3 degrees external rotation, femoral  component.  We then made our anterior and posterior cuts along with anterior, posterior chamfers then directed our attention to the tibia where we used an extramedullary guide to establish a neutral cut the tibia based off the deficient medial side.  Once the tibial cut was made, we checked our flexion and extension gaps and found them to be equal and finished preparation of the femur, making a box cut to finish preparation of the tibia.  We then removed the posterior osteophytes with an osteotome and mallet and injected the posterior capsule with a mixture of ropivacaine and Toradol.  We then sized the patella, made our patellar cuts, finished preparation of the patella.  While cement was being mixed, we began preparing cement surfaces using pulsatile jet lavage antibiotic saline.  Once cement was appropriate consistency, we cemented first the tibial component, followed by the femoral component.  Once these components were fully seated, excess cement was removed using [place] elevator.  We then placed the knee in full extension with trial polyethylene in place and allowed the cement to harden.  At this point, we cemented the patella, again removing the excess cement using [place] elevator.  While the cement was hardening, we soaked the knee in a dilute solution of Betadine and irrigated using pulse jet lavage, used approximately 3 liters of antibiotic saline and pulse jet lavage.  Once cement had hardened, took the knee through a range of motion.  Patella tracked ideally with good soft tissue balance in flexion and extension.  We then removed the trial polyethylene, inspected the joint for loose bone cement, removed what was found.  We then irrigated again using pulse jet lavage and put in the actual rotating platform tibial polyethylene, reduced the knee, took the knee through a range of motion.  Patella tracked ideally.  We then placed the knee in approximately 30 degrees of flexion, irrigated with pulse jet lavage  and closed the arthrotomy using 0 Ethibond sutures.  This was oversewn using 0 Stratafix.  Deep subQ was closed using 0 Vicryl, superficial subQ was closed with 2-0 Vicryl and skin was closed with a running 3-0 Monocryl subcuticular stitch followed by Prineo dressing.  Sterile dressing applied followed by knee immobilizer.  The patient tolerated the procedure.  There were no intraoperative complications.  The patient sent to [facility] in stable condition.    Plan is for the patient get 24 hours of Ancef for infection prophylaxis, 30 days of aspirin for DVT prophylaxis.    Ambar Wilson MD        D: 2022   T: 2022   MT: CYNDI    Name:     LATONIA MARSH  MRN:      [id number]        Account:        [id number]   :      1950           Procedure Date: 2022     Document: [id number]

## 2022-11-11 NOTE — ANESTHESIA PROCEDURE NOTES
"Adductor canal and Femoral Procedure Note    Pre-Procedure   Staff -        Anesthesiologist:  Geri Porter MD       Performed By: anesthesiologist       Location: pre-op       Pre-Anesthestic Checklist: patient identified, IV checked, site marked, risks and benefits discussed, informed consent, monitors and equipment checked, pre-op evaluation, at physician/surgeon's request and post-op pain management  Timeout:       Correct Patient: Yes        Correct Procedure: Yes        Correct Site: Yes        Correct Position: Yes        Correct Laterality: Yes        Site Marked: Yes  Procedure Documentation  Procedure: Adductor canal, Femoral       Laterality: left       Patient Position: supine       Patient Prep/Sterile Barriers: sterile gloves, mask, \"no-touch\" technique        Skin prep: Chloraprep       Local skin infiltrated with 3 mL of 1% lidocaine.        Needle Type: insulated and short bevel ([place]unk)       Needle Gauge: 21.        Needle Length (millimeters): 100        Ultrasound guided       1. Ultrasound was used to identify targeted nerve, plexus, vascular marker, or fascial plane and place a needle adjacent to it in real-time.       2. Ultrasound was used to visualize the spread of anesthetic in close proximity to the above referenced structure.       3. A permanent image is entered into the patient's record.       4. The visualized anatomic structures appeared normal.       5. There were no apparent abnormal pathologic findings.    Assessment/Narrative         The placement was negative for: blood aspirated, painful injection and site bleeding       Paresthesias: No.       Test dose of mL at.         Test dose negative, 3 minutes after injection, for signs of intravascular, subdural, or intrathecal injection.       Bolus given via needle..        Secured via.        Insertion/Infusion Method: Single Shot       Complications: none       Injection made incrementally with aspirations every 5 " "mL.    Medication(s) Administered   Bupivacaine 0.5% w/ 1:400K Epi (Injection) - Injection   15 mL - 11/11/2022 7:00:00 AM   Comments:  0.5% Bupivacaine with 1:400,000 epinephrine injected.  Patient tolerated the procedure well.    The surgeon has given a verbal order transferring care of this patient to me for the performance of a regional analgesia block for post-op pain control. It is requested of me because I am uniquely trained and qualified to perform this block and the surgeon is neither trained nor qualified to perform this procedure.            FOR [facility] ([facility]) ONLY:   Pain Team Contact information: please page the Pain Team Via Mem[employer]. Search \"Pain\". During daytime hours, please page the attending first. At night please page the resident first.    "

## 2022-11-11 NOTE — PROGRESS NOTES
[facility]  OUTPATIENT PHYSICAL THERAPY EVALUATION  PLAN OF TREATMENT FOR OUTPATIENT REHABILITATION  (COMPLETE FOR INITIAL CLAIMS ONLY)  Patient's Last Name, First Name, M.I.  YOB: 1950  JarethMelissa  CASPER                        Provider's Name  [facility] Medical Record No.  [id number]                             Onset Date:  11/11/22   Start of Care Date:  11/11/22   Type:     _X_PT   ___OT   ___SLP Medical Diagnosis:  L TKA              PT Diagnosis:  impaired functioanl mobility Visits from SOC:  1     See note for plan of treatment, functional goals and certification details    I CERTIFY THE NEED FOR THESE SERVICES FURNISHED UNDER        THIS PLAN OF TREATMENT AND WHILE UNDER MY CARE     (Physician co-signature of this document indicates review and certification of the therapy plan).                 11/11/22 1600   Appointment Info   Signing Clinician's Name / Credentials (PT) Kiana Oconnell DPT   Quick Adds   Quick Adds Certification   Living Environment   People in Home spouse   Current Living Arrangements house   Home Accessibility stairs to enter home;stairs within home   Number of Stairs, Main Entrance 3   Stair Railings, Main Entrance railing on left side (ascending)   Number of Stairs, Within Home, Primary greater than 10 stairs   Transportation Anticipated car, drives self;family or friend will provide   Living Environment Comments Will use ramp to get into house at discharge. Has a basement but does not need to go downstairs at initial discharge. Spouse is off of work through the weekend and can take more days off if needed.   Self-Care   Usual Activity Tolerance moderate   Current Activity Tolerance moderate   Equipment Currently Used at Home shower chair;grab bar, toilet;grab bar, tub/shower;walker, rolling   Fall history within last six months no   Activity/Exercise/Self-Care Comment Pt reports decreased tolerance for  activity at baseline due to back pain. IND for mobility and ADLs with no device   General Information   Onset of Illness/Injury or Date of Surgery 11/11/22   Referring Physician Vito Garcia PA-C   Pertinent History of Current Problem (include personal factors and/or comorbidities that impact the POC) POD 0 L TKA   Weight-Bearing Status - LLE weight-bearing as tolerated   General Observations Pt supine upon arrival and agreeable to session. Family present and supportive throughout session   Cognition   Affect/Mental Status (Cognition) WFL   Orientation Status (Cognition) oriented x 4   Follows Commands (Cognition) WFL   Pain Assessment   Patient Currently in Pain Yes, see Vital Sign flowsheet  (2/10)   Integumentary/Edema   Integumentary/Edema other (describe)   Integumentary/Edema Comments ace wrap over L LE appears CDI   Posture    Posture Forward head position;Protracted shoulders   Range of Motion (ROM)   Range of Motion ROM deficits secondary to surgical procedure   ROM Comment L LE deficits consistent with surgery: 0-40*. All other extremities WFL   Strength (Manual Muscle Testing)   Strength (Manual Muscle Testing) Able to perform R SLR;Able to perform L SLR;Deficits observed during functional mobility   Strength Comments L LE strength deficits consistent with surgical history, all other extemities WFL. Moderate L quad contraction noted.   Bed Mobility   Bed Mobility supine-sit   Comment, (Bed Mobility) Zully with log roll technique (uses log roll at baseline due to back pain)   Transfers   Comment, (Transfers) STS with Zully and FWW from elevated EOB   Gait/Stairs (Locomotion)   [place] Level (Gait) contact guard   Assistive Device (Gait) walker, front-wheeled   Distance in Feet (Required for LE Total Joints) 5 ft eval   Distance in Feet (Gait) 25 x2, 60   Pattern (Gait) step-to   Deviations/Abnormal Patterns (Gait) left sided deviations;antalgic;bridgette decreased;gait speed decreased;stride  length decreased;weight shifting decreased   Balance   Balance other (describe)   Balance Comments fair sitting balance, requires FWW and physical assist for all upright mobiilty   Sensory Examination   Sensory Perception Comments BLE neuropathy up to knees at baseline, BLE light touch intact   Coordination   Coordination no deficits were identified   Muscle Tone   Muscle Tone no deficits were identified   Clinical Impression   Criteria for Skilled Therapeutic Intervention Yes, treatment indicated   PT Diagnosis (PT) impaired functioanl mobility   Influenced by the following impairments decreased strength/ROM, balance   Functional limitations due to impairments impaired bed mobility, transfers, ambulation   Clinical Presentation (PT Evaluation Complexity) Stable/Uncomplicated   Clinical Presentation Rationale Based on current presentation, PMH, social support   Clinical Decision Making (Complexity) low complexity   Planned Therapy Interventions (PT) bed mobility training;gait training;home exercise program;patient/family education;ROM (range of motion);stair training;strengthening;transfer training;home program guidelines   Anticipated Equipment Needs at Discharge (PT)   (already owns FWW)   Risk & Benefits of therapy have been explained evaluation/treatment results reviewed;care plan/treatment goals reviewed;risks/benefits reviewed;current/potential barriers reviewed;participants voiced agreement with care plan;participants included;patient;spouse/significant other;daughter   PT Total Evaluation Time   PT Eval, Low Complexity Minutes (81057) 20   Therapy Certification   Start of care date 11/11/22   Certification date from 11/11/22   Certification date to 11/18/22   Medical Diagnosis L TKA   Physical Therapy Goals   PT Frequency 2x/day   PT Predicted Duration/Target Date for Goal Attainment 11/18/22   PT Goals Bed Mobility;Transfers;Gait   PT: Bed Mobility Modified independent;Supine to/from sit   PT: Transfers  Modified independent;Sit to/from stand;Assistive device   PT: Gait Supervision/stand-by assist;100 feet;Assistive device;Rolling walker   Interventions   Interventions Quick Adds Gait Training;Therapeutic Activity;Therapeutic Procedure   Therapeutic Procedure/Exercise   Ther. Procedure: strength, endurance, ROM, flexibillity Minutes (71014) 8   Symptoms Noted During/After Treatment increased pain   Treatment Detail/Skilled Intervention Pt completed the following exercises with use of handout and cues for form. 10 reps each: ankle pumps, QS, GS, heel slides, SLR, SAQ. Pt demos decreased L knee flexion ROM, up to ~40*.  Encouraged to complete exercises once more tonight.   Therapeutic Activity   Therapeutic Activities: dynamic activities to improve functional performance Minutes (04237) 10   Symptoms Noted During/After Treatment Increased pain   Treatment Detail/Skilled Intervention Pt edu on WBAT status, activity recs following surgery, safe mobility. Following eval, pt completed STS x3 with CGA and use of FWW. Cues for safe walker use, improved technique following. Pt able to scoot back in bed with SBA. CGA for sit>sup with logroll technique. Pt able to reposition in bed with SBA. Pt remained in supine with family present, all needs in reach.   Gait Training   Gait Training Minutes (85367) 10   Symptoms Noted During/After Treatment (Gait Training) fatigue   Treatment Detail/Skilled Intervention Following eval, pt completed amb in room and then into martinez with FWW and CGA. Assist to manage IV pole. Demos forward flexed posture, heavy reliance on FWW for UE support. Cues for upright posture and safe walker use. Pt becomes mildly fatigued but VSS on RA.   PT Discharge Planning   PT Plan progress therex, increased amb distance, stairs? (pt has 3 CHINO but planning to use ramp)   PT Discharge Recommendation (DC Rec)   (defer to ortho)   PT Rationale for DC Rec Pt below baseline but mobilizing well. Currently presents with  deficits in strength/ROM, balance, and increased pain. Pt amb with FWW and Ax1. Anticipate that with further skilled therapy, pt will progress to SBA for all mobility with use of FWW. Live with spouse who is able to assist as needed. Owns all appropriate adaptive equipment.   PT Brief overview of current status Ax1 with FWW   Total Session Time   Timed Code Treatment Minutes 28   Total Session Time (sum of timed and untimed services) 48

## 2022-11-11 NOTE — PLAN OF CARE
Patient vital signs are at baseline: No. VSS on 2L   Patient able to ambulate as they were prior to admission or with assist devices provided by therapies during their stay:  No, not met with PT. A1-GB/W pivot to BSC  Patient MUST void prior to discharge: Yes  Patient able to tolerate oral intake: Yes  Pain has adequate pain control using Oral analgesics:  Yes. Schedule tylenol and PRN atarax x1  Does patient have an identified :  yes   Has goal D/C date and time been discussed with patient: yes     LLE ACE wrap x2 and knee immobilizer. LPIV running LR at 100 mL/hr w int abx. CMS- baseline numbness/tingling. Good cap refill.   . Pt refusing most meds.

## 2022-11-11 NOTE — PROGRESS NOTES
Pt used [employer] remote to turn off Spinal Cord Stimulator at this time with RN, CRNA and scrub RN present

## 2022-11-11 NOTE — INTERVAL H&P NOTE
"I have reviewed the surgical (or preoperative) H&P that is linked to this encounter, and examined the patient. There are no significant changes    Clinical Conditions Present on Arrival:  Clinically Significant Risk Factors Present on Admission                    # Obesity: Estimated body mass index is 37.28 kg/m  as calculated from the following:    Height as of this encounter: 1.499 m (4' 11\").    Weight as of this encounter: 83.7 kg (184 lb 9.6 oz).       "

## 2022-11-12 ENCOUNTER — APPOINTMENT (OUTPATIENT)
Dept: PHYSICAL THERAPY | Facility: CLINIC | Age: 72
End: 2022-11-12
Attending: ORTHOPAEDIC SURGERY
Payer: MEDICARE

## 2022-11-12 LAB
FASTING STATUS PATIENT QL REPORTED: NO
GLUCOSE BLD-MCNC: 194 MG/DL (ref 70–99)
HGB BLD-MCNC: 12.2 G/DL (ref 11.7–15.7)

## 2022-11-12 PROCEDURE — 999N000111 HC STATISTIC OT IP EVAL DEFER: Performed by: OCCUPATIONAL THERAPIST

## 2022-11-12 PROCEDURE — 97116 GAIT TRAINING THERAPY: CPT | Mod: GP

## 2022-11-12 PROCEDURE — 97530 THERAPEUTIC ACTIVITIES: CPT | Mod: GP

## 2022-11-12 PROCEDURE — 36415 COLL VENOUS BLD VENIPUNCTURE: CPT | Performed by: PHYSICIAN ASSISTANT

## 2022-11-12 PROCEDURE — 82947 ASSAY GLUCOSE BLOOD QUANT: CPT | Performed by: HOSPITALIST

## 2022-11-12 PROCEDURE — 250N000013 HC RX MED GY IP 250 OP 250 PS 637: Performed by: PHYSICIAN ASSISTANT

## 2022-11-12 PROCEDURE — 85018 HEMOGLOBIN: CPT | Performed by: PHYSICIAN ASSISTANT

## 2022-11-12 RX ORDER — HYDROXYZINE HYDROCHLORIDE 10 MG/1
10 TABLET, FILM COATED ORAL EVERY 6 HOURS PRN
Qty: 20 TABLET | Refills: 0 | Status: SHIPPED | OUTPATIENT
Start: 2022-11-12 | End: 2023-12-19

## 2022-11-12 RX ORDER — OXYCODONE HYDROCHLORIDE 5 MG/1
5 TABLET ORAL EVERY 4 HOURS PRN
Qty: 26 TABLET | Refills: 0 | Status: SHIPPED | OUTPATIENT
Start: 2022-11-12 | End: 2023-12-19

## 2022-11-12 RX ORDER — ACETAMINOPHEN 325 MG/1
650 TABLET ORAL EVERY 4 HOURS PRN
Qty: 100 TABLET | Refills: 0
Start: 2022-11-12 | End: 2023-12-19

## 2022-11-12 RX ORDER — ASPIRIN 81 MG/1
81 TABLET ORAL EVERY EVENING
Status: ON HOLD
Start: 2022-11-12 | End: 2023-12-21

## 2022-11-12 RX ADMIN — ACETAMINOPHEN 975 MG: 325 TABLET, FILM COATED ORAL at 05:15

## 2022-11-12 RX ADMIN — OXYCODONE HYDROCHLORIDE 10 MG: 5 TABLET ORAL at 17:26

## 2022-11-12 RX ADMIN — ACETAMINOPHEN 975 MG: 325 TABLET, FILM COATED ORAL at 21:28

## 2022-11-12 RX ADMIN — PREGABALIN 100 MG: 100 CAPSULE ORAL at 08:44

## 2022-11-12 RX ADMIN — ACETAMINOPHEN 975 MG: 325 TABLET, FILM COATED ORAL at 13:20

## 2022-11-12 RX ADMIN — PREGABALIN 100 MG: 100 CAPSULE ORAL at 13:19

## 2022-11-12 RX ADMIN — HYDROXYZINE HYDROCHLORIDE 10 MG: 10 TABLET ORAL at 03:35

## 2022-11-12 RX ADMIN — MELOXICAM 15 MG: 15 TABLET ORAL at 08:44

## 2022-11-12 RX ADMIN — OXYCODONE HYDROCHLORIDE 5 MG: 5 TABLET ORAL at 00:54

## 2022-11-12 RX ADMIN — PSEUDOEPHEDRINE HCL 120 MG: 120 TABLET, FILM COATED, EXTENDED RELEASE ORAL at 21:28

## 2022-11-12 RX ADMIN — HYDROXYZINE HYDROCHLORIDE 10 MG: 10 TABLET ORAL at 12:28

## 2022-11-12 RX ADMIN — Medication 125 MCG: at 08:45

## 2022-11-12 RX ADMIN — OXYCODONE HYDROCHLORIDE 5 MG: 5 TABLET ORAL at 05:15

## 2022-11-12 RX ADMIN — PREGABALIN 100 MG: 100 CAPSULE ORAL at 20:56

## 2022-11-12 RX ADMIN — TRIAMTERENE AND HYDROCHLOROTHIAZIDE 1 TABLET: 37.5; 25 TABLET ORAL at 08:44

## 2022-11-12 RX ADMIN — OXYCODONE HYDROCHLORIDE 10 MG: 5 TABLET ORAL at 09:19

## 2022-11-12 RX ADMIN — Medication 250 MG: at 20:56

## 2022-11-12 RX ADMIN — METFORMIN ER 500 MG 500 MG: 500 TABLET ORAL at 20:56

## 2022-11-12 RX ADMIN — SENNOSIDES AND DOCUSATE SODIUM 2 TABLET: 50; 8.6 TABLET ORAL at 21:27

## 2022-11-12 RX ADMIN — MONTELUKAST 10 MG: 10 TABLET, FILM COATED ORAL at 21:27

## 2022-11-12 RX ADMIN — ASPIRIN 325 MG: 325 TABLET, COATED ORAL at 08:44

## 2022-11-12 RX ADMIN — OXYCODONE HYDROCHLORIDE 10 MG: 5 TABLET ORAL at 13:19

## 2022-11-12 ASSESSMENT — ACTIVITIES OF DAILY LIVING (ADL)
ADLS_ACUITY_SCORE: 24
ADLS_ACUITY_SCORE: 28
ADLS_ACUITY_SCORE: 28
ADLS_ACUITY_SCORE: 24
ADLS_ACUITY_SCORE: 24
ADLS_ACUITY_SCORE: 28
ADLS_ACUITY_SCORE: 24

## 2022-11-12 NOTE — PLAN OF CARE
OT: orders received and chart reviewed. Pt resting in bed with cpap on, declines any need for OT and states she just doesn't have the strength. Pt states she has her  who can help her at home with LE dressing and stepping in and out of the tub. Pt states she had her other knee done so she has been through this before. Denies any need for AE for ADLs. Will complete OT order per pt request

## 2022-11-12 NOTE — PLAN OF CARE
Goal Outcome Evaluation:         Patient vital signs are at baseline: Yes  Patient able to ambulate as they were prior to admission or with assist devices provided by therapies during their stay:  Yes, up 1 Gb walker  Patient MUST void prior to discharge:  Yes  Patient able to tolerate oral intake:  Yes  Pain has adequate pain control using Oral analgesics:  Yes, oxycodone and atarax  Does patient have an identified :  Yes  Has goal D/C date and time been discussed with patient:  Yes    Pt A&Ox4. WBAT. Cms- baseline numbness and tingling BLE. Dressing CDI. IV SL. Continue to monitor.

## 2022-11-12 NOTE — PROVIDER NOTIFICATION
Updated Kjerstin, ortho PA regarding no discharge orders for Atarax. Patient need second PT this afternoon due to pain issue.

## 2022-11-12 NOTE — PROGRESS NOTES
Orthopedic Surgery  Melissa Templeton  2022  Admit Date:  2022  POD 1 Day Post-Op  S/P Procedure(s):  LEFT TOTAL KNEE ARTHROPLASTY    Patient is feeling okay but didn't sleep well.  Looking forward to going home, had her other knee done a couple of years ago and knows what to expect..  Pain controlled.  Tolerating oral intake.  No events overnight. Discussed expectations and instructions for home with patient and .  Has her own supply of tylenol and senna at home and does not need anymore.  Discussed changes to aspirin and to hold tramadol while taking the oxycodone.    Alert and orient to person, place, and time.  Vital Sign Ranges  Temperature Temp  Av.4  F (36.9  C)  Min: 96.9  F (36.1  C)  Max: 99.9  F (37.7  C)   Blood pressure Systolic (24hrs), Av , Min:107 , Max:170        Diastolic (24hrs), Av, Min:61, Max:107      Pulse Pulse  Av  Min: 88  Max: 99   Respirations Resp  Av.3  Min: 16  Max: 30   Pulse oximetry SpO2  Av.2 %  Min: 92 %  Max: 96 %       Left knee ACE is clean, dry, and intact. Minimal erythema of the surrounding skin.  Bilateral calves are soft, non-tender.  left lower extremity is NVI.    Labs:  Recent Labs   Lab Test 22  0628 21  0748 21  1324   POTASSIUM 3.2* 3.6 4.6     Recent Labs   Lab Test 21  0748 21  0733 21  0901   HGB 12.1 12.4 14.4     No results for input(s): INR in the last 75388 hours.  Recent Labs   Lab Test 21  0901 20  0650    281       A/P  1. S/p left TKA   Continue asprin 325mg every day x 6 weeks for DVT prophylaxis.     Mobilize with PT/OT WBAT.     Continue current pain regimen.    2. Disposition   Anticipate d/c to home today with ..    Kjerstin L Foss, PA-C

## 2022-11-12 NOTE — PLAN OF CARE
Goal Outcome Evaluation:    Patient vital signs are at baseline: Yes  Patient able to ambulate as they were prior to admission or with assist devices provided by therapies during their stay:  No,  Reason:  ambulated with one assist/gb/walker to bathroom, walked 30 feet, no stairs, did not pass second PT.   Patient MUST void prior to discharge:  Yes, voiding per bathroom.  Patient able to tolerate oral intake:  Yes,   Pain has adequate pain control using Oral analgesics:  Yes, oxycodone and atarax  Does patient have an identified :  Yes  Has goal D/C date and time been discussed with patient:  Yes          Baseline numbness on BLE, discharging home tomorrow, ortho aware.

## 2022-11-12 NOTE — PLAN OF CARE
Patient vital signs are at baseline: Yes  Patient able to ambulate as they were prior to admission or with assist devices provided by therapies during their stay:  Yes  Patient MUST void prior to discharge:  Yes  Patient able to tolerate oral intake:  Yes  Pain has adequate pain control using Oral analgesics:  Yes  Does patient have an identified :  Yes  Has goal D/C date and time been discussed with patient:  Yes    A&O x4. Off O2 pt stating at 95. LLE ACE wrap x2 and knee immobilizer. CMS intact except for baseline numbness/tingling LE bilaterally.

## 2022-11-12 NOTE — PROVIDER NOTIFICATION
Called Kjerstin, ortho PA regarding patient did not pass second physical therapy. Need another PT session. Ok to discharge tomorrow morning after PT per Kjerstin, updated PT, patient and spouse.

## 2022-11-13 ENCOUNTER — APPOINTMENT (OUTPATIENT)
Dept: PHYSICAL THERAPY | Facility: CLINIC | Age: 72
End: 2022-11-13
Attending: ORTHOPAEDIC SURGERY
Payer: MEDICARE

## 2022-11-13 VITALS
WEIGHT: 184.6 LBS | DIASTOLIC BLOOD PRESSURE: 93 MMHG | BODY MASS INDEX: 37.21 KG/M2 | HEART RATE: 93 BPM | RESPIRATION RATE: 16 BRPM | SYSTOLIC BLOOD PRESSURE: 148 MMHG | TEMPERATURE: 99.7 F | OXYGEN SATURATION: 94 % | HEIGHT: 59 IN

## 2022-11-13 LAB
GLUCOSE BLDC GLUCOMTR-MCNC: 178 MG/DL (ref 70–99)
HGB BLD-MCNC: 12.6 G/DL (ref 11.7–15.7)
POTASSIUM BLD-SCNC: 3.5 MMOL/L (ref 3.4–5.3)

## 2022-11-13 PROCEDURE — 97116 GAIT TRAINING THERAPY: CPT | Mod: GP

## 2022-11-13 PROCEDURE — 250N000013 HC RX MED GY IP 250 OP 250 PS 637: Performed by: PHYSICIAN ASSISTANT

## 2022-11-13 PROCEDURE — 97530 THERAPEUTIC ACTIVITIES: CPT | Mod: GP

## 2022-11-13 PROCEDURE — 36415 COLL VENOUS BLD VENIPUNCTURE: CPT | Performed by: PHYSICIAN ASSISTANT

## 2022-11-13 PROCEDURE — 84132 ASSAY OF SERUM POTASSIUM: CPT | Performed by: ORTHOPAEDIC SURGERY

## 2022-11-13 PROCEDURE — 85018 HEMOGLOBIN: CPT | Performed by: PHYSICIAN ASSISTANT

## 2022-11-13 PROCEDURE — 82962 GLUCOSE BLOOD TEST: CPT

## 2022-11-13 PROCEDURE — 36415 COLL VENOUS BLD VENIPUNCTURE: CPT | Performed by: ORTHOPAEDIC SURGERY

## 2022-11-13 RX ADMIN — OXYCODONE HYDROCHLORIDE 5 MG: 5 TABLET ORAL at 12:08

## 2022-11-13 RX ADMIN — FOLIC ACID TAB 400 MCG 400 MCG: 400 TAB at 08:08

## 2022-11-13 RX ADMIN — HYDROXYZINE HYDROCHLORIDE 10 MG: 10 TABLET ORAL at 11:32

## 2022-11-13 RX ADMIN — Medication 125 MCG: at 08:08

## 2022-11-13 RX ADMIN — ASPIRIN 325 MG: 325 TABLET, COATED ORAL at 08:07

## 2022-11-13 RX ADMIN — MELOXICAM 15 MG: 15 TABLET ORAL at 08:08

## 2022-11-13 RX ADMIN — METFORMIN ER 500 MG 500 MG: 500 TABLET ORAL at 08:07

## 2022-11-13 RX ADMIN — HYDROXYZINE HYDROCHLORIDE 10 MG: 10 TABLET ORAL at 01:40

## 2022-11-13 RX ADMIN — ACETAMINOPHEN 975 MG: 325 TABLET, FILM COATED ORAL at 05:28

## 2022-11-13 RX ADMIN — OXYCODONE HYDROCHLORIDE 5 MG: 5 TABLET ORAL at 01:40

## 2022-11-13 RX ADMIN — OXYCODONE HYDROCHLORIDE 10 MG: 5 TABLET ORAL at 08:07

## 2022-11-13 RX ADMIN — POLYETHYLENE GLYCOL 3350 17 G: 17 POWDER, FOR SOLUTION ORAL at 08:06

## 2022-11-13 RX ADMIN — PREGABALIN 100 MG: 100 CAPSULE ORAL at 08:08

## 2022-11-13 RX ADMIN — TRIAMTERENE AND HYDROCHLOROTHIAZIDE 1 TABLET: 37.5; 25 TABLET ORAL at 08:08

## 2022-11-13 RX ADMIN — LORATADINE 10 MG: 10 TABLET ORAL at 08:08

## 2022-11-13 ASSESSMENT — ACTIVITIES OF DAILY LIVING (ADL)
ADLS_ACUITY_SCORE: 25
ADLS_ACUITY_SCORE: 27
ADLS_ACUITY_SCORE: 28
ADLS_ACUITY_SCORE: 27

## 2022-11-13 NOTE — PLAN OF CARE
Physical Therapy Discharge Summary    Reason for therapy discharge:    All goals and outcomes met, no further needs identified.    Progress towards therapy goal(s). See goals on Care Plan in [facility] electronic health record for goal details.  Goals met    Therapy recommendation(s):    Continued therapy is recommended.  Rationale/Recommendations:  Recommend continued outpatient physical therapy services to continue increasing ROM and strength as well as improving safety and independence with functional mobility.

## 2022-11-13 NOTE — PROGRESS NOTES
Procedure(s):  LEFT TOTAL KNEE ARTHROPLASTY   -2 Days Post-Op        Pain control much improved today  Able to walk this am in PT and do stairs    Vital Signs with Ranges  Temp:  [99.5  F (37.5  C)-99.8  F (37.7  C)] 99.7  F (37.6  C)  Pulse:  [87-95] 93  Resp:  [16-20] 16  BP: (132-167)/(76-93) 148/93  SpO2:  [89 %-94 %] 94 %  I/O last 3 completed shifts:  In: 480 [P.O.:480]  Out: -   Recent Labs   Lab 11/13/22  1054 11/12/22  0956   HGB 12.6 12.2       Dressing clean, dry and intact.  Swelling appropriate for post operative condition  Able to dorsiflex at the ankle without difficulty      Plan:    Discharge to home today     Molly Landin PA-C

## 2022-11-13 NOTE — PLAN OF CARE
Goal Outcome Evaluation:       Patient vital signs are at baseline: Yes, VSS, A&OX4. Baseline bilateral LE numbness related neuropathy   Patient able to ambulate as they were prior to admission or with assist devices provided by therapies during their stay:  Yes  Patient MUST void prior to discharge:  Yes, up to bathroom voidng without problem  Patient able to tolerate oral intake:  Yes, iv saline locked  Pain has adequate pain control using Oral analgesics:  Yes, pain managed during this shift with po prn oxycodone, atarax and scheduled tylenol.   Does patient have an identified :  Yes  Has goal D/C date and time been discussed with patient:  Yes, home discharge pending this morning

## 2022-11-13 NOTE — PLAN OF CARE
Patient vital signs are at baseline: Yes, except with temp of 99.  Patient able to ambulate as they were prior to admission or with assist devices provided by therapies during their stay:  Yes  Patient MUST void prior to discharge:  Yes  Patient able to tolerate oral intake:  Yes  Pain has adequate pain control using Oral analgesics:  Yes  Does patient have an identified :  Yes  Has goal D/C date and time been discussed with patient:  Yes   Yolie CDI. Oxycodone given for pain.  Up with 1 assist/walker. Home tomorrow.

## 2022-11-13 NOTE — PLAN OF CARE
Patient vital signs are at baseline: Yes  Patient able to ambulate as they were prior to admission or with assist devices provided by therapies during their stay:  Yes  Patient MUST void prior to discharge:  Yes  Patient able to tolerate oral intake:  Yes  Pain has adequate pain control using Oral analgesics:  Yes  Does patient have an identified :  Yes  Has goal D/C date and time been discussed with patient:  Yes   Oxycodone and atarax for pain with good pain control.  Ace wrap removed, dreg CDI. All discharge medications and pills explained  and given to the pt.

## 2022-12-04 ENCOUNTER — HEALTH MAINTENANCE LETTER (OUTPATIENT)
Age: 72
End: 2022-12-04

## 2023-03-26 NOTE — PROGRESS NOTES
Bedside and Verbal shift change report given to JUANI Tyson RN (oncoming nurse) by Gloria Lyman). Report included the following information SBAR, Kardex, Intake/Output, MAR, and Recent Results. Time for questions and clarification was given and care assumed at this time. [facility]    Medicine Progress Note - Hospitalist Service       Date of Admission:  1/7/2020  Assessment & Plan   Melissa Templeton is a 69-year-old female with a past medical history significant for obstructive sleep apnea, hyperlipidemia, obesity, osteoarthritis, asthma and prediabetes who is status post L3, L4, L5 laminectomy and posterior spinal fusion of L3 through S1.  The Hospitalist Service was consulted for postoperative medical co-management.     Status post L3, L4, and L5 laminectomy with posterior spinal fusion of L3 through S1  -  POD 2  - Routine post-op cares and pain control per Ortho     Mild persistent asthma:  The patient reports recent exacerbation treated with steroid taper and a Z-Nehemiah. Currently breathing feels well. No wheezing  - Continue PTA inhalers  - Encourage IS and ambulation    Obstructive sleep apnea:    -Continue CPAP per home settings.     Hypertension: [PTA regimen includes triamterene/hydrochlorothiazide 37.5-35 mg/d]  - Hold for now  - Resume on discharge    Hypokalemia, resolved:   - Resolved with repalcement     Diet: Advance Diet as Tolerated: Regular Diet Adult    DVT Prophylaxis: Pneumatic Compression Devices  Concepcion Catheter: not present  Code Status: Full Code      Disposition Plan   Expected discharge: Per Surgery team  Entered: Jaclyn Negrete PA-C 01/09/2020, 12:41 PM       The patient's care was discussed with the Bedside Nurse and Patient.    Jaclyn Negrete PA-C  Hospitalist Service  [facility]    ______________________________________________________________________    Interval History   Doing well. Pain well controlled. No BM yet. Feels a little bloated. Tolerating clears    Data reviewed today: I reviewed all medications, new labs and imaging results over the last 24 hours. I personally reviewed no images or EKG's today.    Physical Exam   Vital Signs: Temp: 97.7  F (36.5  C) Temp src: Oral BP: 117/75   Heart Rate: 94 Resp: 16  SpO2: 91 % O2 Device: None (Room air) Oxygen Delivery: 3 LPM  Weight: 180 lbs 0 oz  Constitutional: Alert, resting comfortably in NAD  HEENT: Head normocephalic, atraumatic. Eyes sclera non icteric. Oropharynx clear and most  Respiratory: Normal effort, symmetric expansion, no crackles or wheezing  Cardiovascular: RRR no murmurs   GI: Non distended, normal bowels sounds, no tenderness or guarding  MSK: LE without edema. Dorsalis pedis pulse palpated bilaterally.   Skin/Integumen: Clear  Neuro: CN II-XII grossly intact  Psych:  Alert and oriented x 3. Normal affect      Data   Recent Labs   Lab 01/09/20  0808 01/08/20  0446 01/07/20  0627   HGB 11.9 12.4  --    NA  --  137  --    POTASSIUM  --  4.0 3.3*   CHLORIDE  --  106  --    CO2  --  25  --    BUN  --  9  --    CR  --  0.52  --    ANIONGAP  --  6  --    MATTI  --  7.7*  --    GLC  --  157*  --      No results found for this or any previous visit (from the past 24 hour(s)).

## 2023-04-01 ENCOUNTER — HEALTH MAINTENANCE LETTER (OUTPATIENT)
Age: 73
End: 2023-04-01

## 2023-08-26 ENCOUNTER — HEALTH MAINTENANCE LETTER (OUTPATIENT)
Age: 73
End: 2023-08-26

## 2023-09-14 ENCOUNTER — MEDICAL CORRESPONDENCE (OUTPATIENT)
Dept: HEALTH INFORMATION MANAGEMENT | Facility: CLINIC | Age: 73
End: 2023-09-14

## 2023-11-04 ENCOUNTER — HEALTH MAINTENANCE LETTER (OUTPATIENT)
Age: 73
End: 2023-11-04

## 2023-12-19 RX ORDER — AMOXICILLIN 500 MG/1
4 CAPSULE ORAL
COMMUNITY

## 2023-12-19 RX ORDER — TRAMADOL HYDROCHLORIDE 50 MG/1
2 TABLET ORAL AT BEDTIME
COMMUNITY

## 2023-12-19 RX ORDER — SENNOSIDES 8.6 MG
2 TABLET ORAL EVERY EVENING
COMMUNITY

## 2023-12-19 RX ORDER — TRAMADOL HYDROCHLORIDE 50 MG/1
1 TABLET ORAL 2 TIMES DAILY
COMMUNITY

## 2023-12-19 NOTE — PROGRESS NOTES
PTA medications updated by Medication Scribe prior to surgery via phone call with patient (last doses completed by Nurse)     Medication history sources: Patient, Surescripts, H&P, and Patient's home med list  In the past week, patient estimated taking medication this percent of the time: Greater than 90%      Significant changes made to the medication list:  Patient reports no longer taking the following meds (med scribe removed from PTA med list): Hydroxyzine, Metformin, Oxycodone, Ondansetron, Azelastine NA      Additional medication history information:   Patient was advised to bring: Breo Ellipta Inhaler, Albuterol Inhaler, Flonase NA    Medication reconciliation completed by provider prior to medication history? No    Time spent in this activity: 45 minutes    The information provided in this note is only as accurate as the sources available at the time of update(s)      Prior to Admission medications    Medication Sig Last Dose Taking? Auth Provider Long Term End Date   acetaminophen (TYLENOL) 500 MG tablet Take 2 tablets by mouth 4 times daily 12/19/2023 at am Yes Reported, Patient     albuterol (PROAIR HFA/PROVENTIL HFA/VENTOLIN HFA) 108 (90 BASE) MCG/ACT Inhaler Inhale 2 puffs into the lungs 2 times daily as needed for shortness of breath / dyspnea or wheezing  Unknown at prn Yes Reported, Patient Yes    amoxicillin (AMOXIL) 500 MG capsule Take 4 capsules by mouth once as needed (1 hour prior to dental appointments 4 x 500 mg = 2,000 mg) Unknown at prn Yes Reported, Patient     Apoaequorin (PREVAGEN) 10 MG CAPS Take 1 capsule by mouth daily 12/13/2023 at am Yes Reported, Patient     aspirin 81 MG EC tablet Take 1 tablet (81 mg) by mouth every evening More than two weeks at Unknown Yes Foss, Kjerstin L, PA-C No    cholecalciferol (VITAMIN D3) 125 MCG (5000 UT) TABS tablet Take 1 tablet by mouth daily 12/19/2023 at am Yes Reported, Patient     DM-Phenylephrine-Acetaminophen (VICKS DAYQUIL COLD & FLU PO) Take  15 mLs by mouth as needed Unknown at prn Yes Reported, Patient     FIBER PO Take 5 tablets by mouth daily 12/19/2023 at am Yes Reported, Patient     fluticasone (FLONASE) 50 MCG/ACT nasal spray Spray 1-2 sprays into both nostrils as needed for rhinitis or allergies Unknown at prn Yes Reported, Patient     fluticasone-vilanterol (BREO ELLIPTA) 100-25 MCG/INH inhaler Inhale 1 puff into the lungs every morning (last filled 11/30/2020)  at am Yes Reported, Patient     folic acid (FOLVITE) 400 MCG tablet Take 1 tablet by mouth every morning 12/19/2023 at am Yes Reported, Patient     guaiFENesin 1200 MG TB12 Take 1 tablet by mouth every morning 12/19/2023 at am Yes Unknown, Entered By History     loratadine (CLARITIN) 10 MG tablet Take 1 tablet by mouth every morning 12/19/2023 at am Yes Reported, Patient     Magnesium 400 MG CAPS Take 1 capsule by mouth every evening 12/19/2023 at pm Yes Reported, Patient     meloxicam (MOBIC) 15 MG tablet Take 1 tablet by mouth daily 12/6/2023 at am Yes Reported, Patient No    montelukast (SINGULAIR) 10 MG tablet Take 1 tablet by mouth at bedtime 12/19/2023 at pm Yes Reported, Patient Yes    Multiple Vitamins-Minerals (PRESERVISION AREDS 2 PO) Take 1 capsule by mouth 2 times daily 12/13/2023 at pm Yes Reported, Patient     naloxone (NARCAN) 4 MG/0.1ML nasal spray Spray 4 mg into one nostril alternating nostrils as needed for opioid reversal every 2-3 minutes until assistance arrives Unknown at prn Yes Reported, Patient     Omega-3 Fatty Acids (FISH OIL) 1200 MG capsule Take 1 capsule by mouth daily 12/6/2023 at am Yes Reported, Patient     pregabalin (LYRICA) 150 MG capsule Take 1 capsule by mouth 3 times daily 12/19/2023 at pm Yes Reported, Patient Yes    Probiotic Product (PROBIOTIC PO) Take 1 capsule by mouth daily 12/19/2023 at am Yes Reported, Patient     pseudoePHEDrine (SUDAFED) 120 MG 12 hr tablet Take 1 tablet by mouth at bedtime 12/19/2023 at pm Yes Reported, Patient      sennosides (SENOKOT) 8.6 MG tablet Take 2 tablets by mouth every evening 12/19/2023 at pm Yes Reported, Patient     traMADol (ULTRAM) 50 MG tablet Take 1 tablet by mouth 2 times daily 12/19/2023 at pm Yes Reported, Patient No    traMADol (ULTRAM) 50 MG tablet Take 2 tablets by mouth at bedtime (2 x 50 mg = 100 mg) 12/19/2023 at pm Yes Reported, Patient No    triamterene-HCTZ (MAXZIDE-25) 37.5-25 MG tablet Take 1 tablet by mouth daily as needed (for blood pressure) Unknown at prn Yes Reported, Patient No        Medication history completed by: Seng Duffy CPhT

## 2023-12-20 ENCOUNTER — ANESTHESIA EVENT (OUTPATIENT)
Dept: SURGERY | Facility: CLINIC | Age: 73
End: 2023-12-20
Payer: MEDICARE

## 2023-12-20 ENCOUNTER — HOSPITAL ENCOUNTER (OUTPATIENT)
Facility: CLINIC | Age: 73
Discharge: HOME | End: 2023-12-21
Attending: ORTHOPAEDIC SURGERY | Admitting: ORTHOPAEDIC SURGERY
Payer: MEDICARE

## 2023-12-20 ENCOUNTER — ANESTHESIA (OUTPATIENT)
Dept: SURGERY | Facility: CLINIC | Age: 73
End: 2023-12-20
Payer: MEDICARE

## 2023-12-20 DIAGNOSIS — Z96.612 STATUS POST REVERSE TOTAL REPLACEMENT OF LEFT SHOULDER: Primary | ICD-10-CM

## 2023-12-20 DIAGNOSIS — Z96.652 TOTAL KNEE REPLACEMENT STATUS, LEFT: ICD-10-CM

## 2023-12-20 LAB
CREAT SERPL-MCNC: 0.55 MG/DL (ref 0.51–0.95)
EGFRCR SERPLBLD CKD-EPI 2021: >90 ML/MIN/1.73M2
FASTING STATUS PATIENT QL REPORTED: YES
GLUCOSE BLDC GLUCOMTR-MCNC: 188 MG/DL (ref 70–99)
GLUCOSE SERPL-MCNC: 106 MG/DL (ref 70–99)
POTASSIUM SERPL-SCNC: 4 MMOL/L (ref 3.4–5.3)

## 2023-12-20 PROCEDURE — 258N000001 HC RX 258: Performed by: ORTHOPAEDIC SURGERY

## 2023-12-20 PROCEDURE — 250N000013 HC RX MED GY IP 250 OP 250 PS 637: Performed by: PHYSICIAN ASSISTANT

## 2023-12-20 PROCEDURE — 258N000003 HC RX IP 258 OP 636: Performed by: ANESTHESIOLOGY

## 2023-12-20 PROCEDURE — 250N000009 HC RX 250: Performed by: ORTHOPAEDIC SURGERY

## 2023-12-20 PROCEDURE — 82565 ASSAY OF CREATININE: CPT | Performed by: ANESTHESIOLOGY

## 2023-12-20 PROCEDURE — 250N000011 HC RX IP 250 OP 636: Performed by: PHYSICIAN ASSISTANT

## 2023-12-20 PROCEDURE — 250N000011 HC RX IP 250 OP 636: Performed by: ANESTHESIOLOGY

## 2023-12-20 PROCEDURE — 250N000009 HC RX 250: Performed by: REGISTERED NURSE

## 2023-12-20 PROCEDURE — 82962 GLUCOSE BLOOD TEST: CPT

## 2023-12-20 PROCEDURE — L3670 SO ACRO/CLAV CAN WEB PRE OTS: HCPCS

## 2023-12-20 PROCEDURE — 250N000009 HC RX 250: Performed by: ANESTHESIOLOGY

## 2023-12-20 PROCEDURE — 250N000025 HC SEVOFLURANE, PER MIN: Performed by: ORTHOPAEDIC SURGERY

## 2023-12-20 PROCEDURE — C1776 JOINT DEVICE (IMPLANTABLE): HCPCS | Performed by: ORTHOPAEDIC SURGERY

## 2023-12-20 PROCEDURE — 360N000077 HC SURGERY LEVEL 4, PER MIN: Performed by: ORTHOPAEDIC SURGERY

## 2023-12-20 PROCEDURE — 370N000017 HC ANESTHESIA TECHNICAL FEE, PER MIN: Performed by: ORTHOPAEDIC SURGERY

## 2023-12-20 PROCEDURE — 82947 ASSAY GLUCOSE BLOOD QUANT: CPT | Mod: 91 | Performed by: ANESTHESIOLOGY

## 2023-12-20 PROCEDURE — C1713 ANCHOR/SCREW BN/BN,TIS/BN: HCPCS | Performed by: ORTHOPAEDIC SURGERY

## 2023-12-20 PROCEDURE — 36415 COLL VENOUS BLD VENIPUNCTURE: CPT | Performed by: ANESTHESIOLOGY

## 2023-12-20 PROCEDURE — 250N000011 HC RX IP 250 OP 636: Performed by: REGISTERED NURSE

## 2023-12-20 PROCEDURE — 999N000141 HC STATISTIC PRE-PROCEDURE NURSING ASSESSMENT: Performed by: ORTHOPAEDIC SURGERY

## 2023-12-20 PROCEDURE — 710N000009 HC RECOVERY PHASE 1, LEVEL 1, PER MIN: Performed by: ORTHOPAEDIC SURGERY

## 2023-12-20 PROCEDURE — 272N000001 HC OR GENERAL SUPPLY STERILE: Performed by: ORTHOPAEDIC SURGERY

## 2023-12-20 PROCEDURE — 84132 ASSAY OF SERUM POTASSIUM: CPT | Performed by: ANESTHESIOLOGY

## 2023-12-20 DEVICE — IMPLANTABLE DEVICE
Type: IMPLANTABLE DEVICE | Site: SHOULDER | Status: FUNCTIONAL
Brand: TORNIER FLEX SHOULDER SYSTEM

## 2023-12-20 DEVICE — IMPLANTABLE DEVICE
Type: IMPLANTABLE DEVICE | Site: SHOULDER | Status: FUNCTIONAL
Brand: TORNIER PERFORM™ REVERSED

## 2023-12-20 DEVICE — SCREW CENTRAL 6.5X30MM DWJ130: Type: IMPLANTABLE DEVICE | Site: SHOULDER | Status: FUNCTIONAL

## 2023-12-20 DEVICE — IMPLANTABLE DEVICE
Type: IMPLANTABLE DEVICE | Site: SHOULDER | Status: FUNCTIONAL
Brand: TORNIER PERFORM® REVERSED GLENOID

## 2023-12-20 DEVICE — SCREW PERIPHERAL 5.0X30MM DWJ330: Type: IMPLANTABLE DEVICE | Site: SHOULDER | Status: FUNCTIONAL

## 2023-12-20 RX ORDER — HYDROMORPHONE HCL IN WATER/PF 6 MG/30 ML
0.4 PATIENT CONTROLLED ANALGESIA SYRINGE INTRAVENOUS
Status: DISCONTINUED | OUTPATIENT
Start: 2023-12-20 | End: 2023-12-21 | Stop reason: HOSPADM

## 2023-12-20 RX ORDER — HYDROXYZINE HYDROCHLORIDE 10 MG/1
10 TABLET, FILM COATED ORAL EVERY 6 HOURS PRN
Status: DISCONTINUED | OUTPATIENT
Start: 2023-12-20 | End: 2023-12-21 | Stop reason: HOSPADM

## 2023-12-20 RX ORDER — ONDANSETRON 2 MG/ML
4 INJECTION INTRAMUSCULAR; INTRAVENOUS EVERY 6 HOURS PRN
Status: DISCONTINUED | OUTPATIENT
Start: 2023-12-20 | End: 2023-12-21 | Stop reason: HOSPADM

## 2023-12-20 RX ORDER — ONDANSETRON 2 MG/ML
4 INJECTION INTRAMUSCULAR; INTRAVENOUS EVERY 30 MIN PRN
Status: DISCONTINUED | OUTPATIENT
Start: 2023-12-20 | End: 2023-12-20 | Stop reason: HOSPADM

## 2023-12-20 RX ORDER — MAGNESIUM OXIDE 400 MG/1
400 TABLET ORAL EVERY EVENING
Status: DISCONTINUED | OUTPATIENT
Start: 2023-12-20 | End: 2023-12-21 | Stop reason: HOSPADM

## 2023-12-20 RX ORDER — MELOXICAM 15 MG/1
15 TABLET ORAL DAILY
Status: DISCONTINUED | OUTPATIENT
Start: 2023-12-20 | End: 2023-12-20

## 2023-12-20 RX ORDER — TRIAMTERENE/HYDROCHLOROTHIAZID 37.5-25 MG
1 TABLET ORAL DAILY PRN
Status: DISCONTINUED | OUTPATIENT
Start: 2023-12-20 | End: 2023-12-21 | Stop reason: HOSPADM

## 2023-12-20 RX ORDER — OXYCODONE HYDROCHLORIDE 5 MG/1
10 TABLET ORAL EVERY 4 HOURS PRN
Status: DISCONTINUED | OUTPATIENT
Start: 2023-12-20 | End: 2023-12-21 | Stop reason: HOSPADM

## 2023-12-20 RX ORDER — FLUTICASONE PROPIONATE 50 MCG
1-2 SPRAY, SUSPENSION (ML) NASAL DAILY
Status: DISCONTINUED | OUTPATIENT
Start: 2023-12-20 | End: 2023-12-21 | Stop reason: HOSPADM

## 2023-12-20 RX ORDER — TRANEXAMIC ACID 650 MG/1
1950 TABLET ORAL ONCE
Status: COMPLETED | OUTPATIENT
Start: 2023-12-20 | End: 2023-12-20

## 2023-12-20 RX ORDER — HYDRALAZINE HYDROCHLORIDE 20 MG/ML
2.5-5 INJECTION INTRAMUSCULAR; INTRAVENOUS EVERY 10 MIN PRN
Status: DISCONTINUED | OUTPATIENT
Start: 2023-12-20 | End: 2023-12-20 | Stop reason: HOSPADM

## 2023-12-20 RX ORDER — FENTANYL CITRATE 0.05 MG/ML
50 INJECTION, SOLUTION INTRAMUSCULAR; INTRAVENOUS EVERY 5 MIN PRN
Status: DISCONTINUED | OUTPATIENT
Start: 2023-12-20 | End: 2023-12-20 | Stop reason: HOSPADM

## 2023-12-20 RX ORDER — MONTELUKAST SODIUM 10 MG/1
10 TABLET ORAL AT BEDTIME
Status: DISCONTINUED | OUTPATIENT
Start: 2023-12-20 | End: 2023-12-21 | Stop reason: HOSPADM

## 2023-12-20 RX ORDER — PSEUDOEPHEDRINE HCL 120 MG/1
120 TABLET, FILM COATED, EXTENDED RELEASE ORAL AT BEDTIME
Status: DISCONTINUED | OUTPATIENT
Start: 2023-12-20 | End: 2023-12-21 | Stop reason: HOSPADM

## 2023-12-20 RX ORDER — ONDANSETRON 2 MG/ML
INJECTION INTRAMUSCULAR; INTRAVENOUS PRN
Status: DISCONTINUED | OUTPATIENT
Start: 2023-12-20 | End: 2023-12-20

## 2023-12-20 RX ORDER — ASPIRIN 325 MG
325 TABLET, DELAYED RELEASE (ENTERIC COATED) ORAL DAILY
Qty: 30 TABLET | Refills: 0 | Status: SHIPPED | OUTPATIENT
Start: 2023-12-20

## 2023-12-20 RX ORDER — SODIUM CHLORIDE, SODIUM LACTATE, POTASSIUM CHLORIDE, CALCIUM CHLORIDE 600; 310; 30; 20 MG/100ML; MG/100ML; MG/100ML; MG/100ML
INJECTION, SOLUTION INTRAVENOUS CONTINUOUS
Status: DISCONTINUED | OUTPATIENT
Start: 2023-12-20 | End: 2023-12-20 | Stop reason: HOSPADM

## 2023-12-20 RX ORDER — ALBUTEROL SULFATE 90 UG/1
2 AEROSOL, METERED RESPIRATORY (INHALATION) 2 TIMES DAILY PRN
Status: DISCONTINUED | OUTPATIENT
Start: 2023-12-20 | End: 2023-12-21 | Stop reason: HOSPADM

## 2023-12-20 RX ORDER — ONDANSETRON 4 MG/1
4 TABLET, ORALLY DISINTEGRATING ORAL EVERY 6 HOURS PRN
Status: DISCONTINUED | OUTPATIENT
Start: 2023-12-20 | End: 2023-12-21 | Stop reason: HOSPADM

## 2023-12-20 RX ORDER — DEXAMETHASONE SODIUM PHOSPHATE 4 MG/ML
INJECTION, SOLUTION INTRA-ARTICULAR; INTRALESIONAL; INTRAMUSCULAR; INTRAVENOUS; SOFT TISSUE PRN
Status: DISCONTINUED | OUTPATIENT
Start: 2023-12-20 | End: 2023-12-20

## 2023-12-20 RX ORDER — CALCIUM POLYCARBOPHIL 625 MG 625 MG/1
625 TABLET ORAL DAILY
Status: DISCONTINUED | OUTPATIENT
Start: 2023-12-20 | End: 2023-12-21 | Stop reason: HOSPADM

## 2023-12-20 RX ORDER — HYDROMORPHONE HCL IN WATER/PF 6 MG/30 ML
0.4 PATIENT CONTROLLED ANALGESIA SYRINGE INTRAVENOUS EVERY 5 MIN PRN
Status: DISCONTINUED | OUTPATIENT
Start: 2023-12-20 | End: 2023-12-20 | Stop reason: HOSPADM

## 2023-12-20 RX ORDER — FENTANYL CITRATE 0.05 MG/ML
25 INJECTION, SOLUTION INTRAMUSCULAR; INTRAVENOUS EVERY 5 MIN PRN
Status: DISCONTINUED | OUTPATIENT
Start: 2023-12-20 | End: 2023-12-20 | Stop reason: HOSPADM

## 2023-12-20 RX ORDER — TRAMADOL HYDROCHLORIDE 50 MG/1
50 TABLET ORAL 2 TIMES DAILY
Status: DISCONTINUED | OUTPATIENT
Start: 2023-12-20 | End: 2023-12-21 | Stop reason: HOSPADM

## 2023-12-20 RX ORDER — LIDOCAINE HYDROCHLORIDE 20 MG/ML
INJECTION, SOLUTION INFILTRATION; PERINEURAL PRN
Status: DISCONTINUED | OUTPATIENT
Start: 2023-12-20 | End: 2023-12-20

## 2023-12-20 RX ORDER — MAGNESIUM HYDROXIDE 1200 MG/15ML
LIQUID ORAL PRN
Status: DISCONTINUED | OUTPATIENT
Start: 2023-12-20 | End: 2023-12-20 | Stop reason: HOSPADM

## 2023-12-20 RX ORDER — TRAMADOL HYDROCHLORIDE 50 MG/1
100 TABLET ORAL AT BEDTIME
Status: DISCONTINUED | OUTPATIENT
Start: 2023-12-20 | End: 2023-12-21 | Stop reason: HOSPADM

## 2023-12-20 RX ORDER — CEFAZOLIN SODIUM/WATER 2 G/20 ML
2 SYRINGE (ML) INTRAVENOUS
Status: COMPLETED | OUTPATIENT
Start: 2023-12-20 | End: 2023-12-20

## 2023-12-20 RX ORDER — PROCHLORPERAZINE MALEATE 5 MG
5 TABLET ORAL EVERY 6 HOURS PRN
Status: DISCONTINUED | OUTPATIENT
Start: 2023-12-20 | End: 2023-12-21 | Stop reason: HOSPADM

## 2023-12-20 RX ORDER — ONDANSETRON 4 MG/1
4 TABLET, ORALLY DISINTEGRATING ORAL EVERY 30 MIN PRN
Status: DISCONTINUED | OUTPATIENT
Start: 2023-12-20 | End: 2023-12-20 | Stop reason: HOSPADM

## 2023-12-20 RX ORDER — ASPIRIN 325 MG
325 TABLET, DELAYED RELEASE (ENTERIC COATED) ORAL DAILY
Status: DISCONTINUED | OUTPATIENT
Start: 2023-12-20 | End: 2023-12-21 | Stop reason: HOSPADM

## 2023-12-20 RX ORDER — SODIUM CHLORIDE, SODIUM LACTATE, POTASSIUM CHLORIDE, CALCIUM CHLORIDE 600; 310; 30; 20 MG/100ML; MG/100ML; MG/100ML; MG/100ML
INJECTION, SOLUTION INTRAVENOUS CONTINUOUS
Status: DISCONTINUED | OUTPATIENT
Start: 2023-12-20 | End: 2023-12-21 | Stop reason: HOSPADM

## 2023-12-20 RX ORDER — LORATADINE 10 MG/1
10 TABLET ORAL EVERY MORNING
Status: DISCONTINUED | OUTPATIENT
Start: 2023-12-21 | End: 2023-12-21 | Stop reason: HOSPADM

## 2023-12-20 RX ORDER — AMOXICILLIN 250 MG
1-2 CAPSULE ORAL 2 TIMES DAILY
Qty: 30 TABLET | Refills: 0 | Status: SHIPPED | OUTPATIENT
Start: 2023-12-20 | End: 2023-12-21

## 2023-12-20 RX ORDER — CLINDAMYCIN PHOSPHATE 900 MG/50ML
900 INJECTION, SOLUTION INTRAVENOUS EVERY 8 HOURS
Qty: 100 ML | Refills: 0 | Status: COMPLETED | OUTPATIENT
Start: 2023-12-20 | End: 2023-12-20

## 2023-12-20 RX ORDER — ACETAMINOPHEN 325 MG/1
650 TABLET ORAL EVERY 4 HOURS PRN
Qty: 100 TABLET | Refills: 0 | Status: SHIPPED | OUTPATIENT
Start: 2023-12-20 | End: 2023-12-21

## 2023-12-20 RX ORDER — ONDANSETRON 4 MG/1
4 TABLET, ORALLY DISINTEGRATING ORAL EVERY 8 HOURS PRN
Qty: 10 TABLET | Refills: 0 | Status: SHIPPED | OUTPATIENT
Start: 2023-12-20

## 2023-12-20 RX ORDER — CEFAZOLIN SODIUM/WATER 2 G/20 ML
2 SYRINGE (ML) INTRAVENOUS SEE ADMIN INSTRUCTIONS
Status: DISCONTINUED | OUTPATIENT
Start: 2023-12-20 | End: 2023-12-20 | Stop reason: HOSPADM

## 2023-12-20 RX ORDER — GUAIFENESIN 600 MG/1
1200 TABLET, EXTENDED RELEASE ORAL EVERY MORNING
Status: DISCONTINUED | OUTPATIENT
Start: 2023-12-21 | End: 2023-12-21 | Stop reason: HOSPADM

## 2023-12-20 RX ORDER — POLYETHYLENE GLYCOL 3350 17 G/17G
17 POWDER, FOR SOLUTION ORAL DAILY
Status: DISCONTINUED | OUTPATIENT
Start: 2023-12-21 | End: 2023-12-21 | Stop reason: HOSPADM

## 2023-12-20 RX ORDER — PROPOFOL 10 MG/ML
INJECTION, EMULSION INTRAVENOUS PRN
Status: DISCONTINUED | OUTPATIENT
Start: 2023-12-20 | End: 2023-12-20

## 2023-12-20 RX ORDER — HYDROMORPHONE HCL IN WATER/PF 6 MG/30 ML
0.2 PATIENT CONTROLLED ANALGESIA SYRINGE INTRAVENOUS EVERY 5 MIN PRN
Status: DISCONTINUED | OUTPATIENT
Start: 2023-12-20 | End: 2023-12-20 | Stop reason: HOSPADM

## 2023-12-20 RX ORDER — ACETAMINOPHEN 325 MG/1
975 TABLET ORAL EVERY 8 HOURS
Qty: 27 TABLET | Refills: 0 | Status: DISCONTINUED | OUTPATIENT
Start: 2023-12-20 | End: 2023-12-21 | Stop reason: HOSPADM

## 2023-12-20 RX ORDER — FENTANYL CITRATE 50 UG/ML
INJECTION, SOLUTION INTRAMUSCULAR; INTRAVENOUS PRN
Status: DISCONTINUED | OUTPATIENT
Start: 2023-12-20 | End: 2023-12-20

## 2023-12-20 RX ORDER — LIDOCAINE 40 MG/G
CREAM TOPICAL
Status: DISCONTINUED | OUTPATIENT
Start: 2023-12-20 | End: 2023-12-21 | Stop reason: HOSPADM

## 2023-12-20 RX ORDER — ACETAMINOPHEN 325 MG/1
650 TABLET ORAL EVERY 4 HOURS PRN
Status: DISCONTINUED | OUTPATIENT
Start: 2023-12-23 | End: 2023-12-21 | Stop reason: HOSPADM

## 2023-12-20 RX ORDER — SENNOSIDES 8.6 MG
2 TABLET ORAL EVERY EVENING
Status: DISCONTINUED | OUTPATIENT
Start: 2023-12-20 | End: 2023-12-20

## 2023-12-20 RX ORDER — AMOXICILLIN 250 MG
1 CAPSULE ORAL 2 TIMES DAILY
Status: DISCONTINUED | OUTPATIENT
Start: 2023-12-20 | End: 2023-12-21 | Stop reason: HOSPADM

## 2023-12-20 RX ORDER — OXYCODONE HYDROCHLORIDE 5 MG/1
5 TABLET ORAL EVERY 4 HOURS PRN
Status: DISCONTINUED | OUTPATIENT
Start: 2023-12-20 | End: 2023-12-21 | Stop reason: HOSPADM

## 2023-12-20 RX ORDER — BISACODYL 10 MG
10 SUPPOSITORY, RECTAL RECTAL DAILY PRN
Status: DISCONTINUED | OUTPATIENT
Start: 2023-12-20 | End: 2023-12-21 | Stop reason: HOSPADM

## 2023-12-20 RX ORDER — LIDOCAINE 40 MG/G
CREAM TOPICAL
Status: DISCONTINUED | OUTPATIENT
Start: 2023-12-20 | End: 2023-12-20 | Stop reason: HOSPADM

## 2023-12-20 RX ORDER — OXYCODONE HYDROCHLORIDE 5 MG/1
5-10 TABLET ORAL EVERY 4 HOURS PRN
Qty: 26 TABLET | Refills: 0 | Status: SHIPPED | OUTPATIENT
Start: 2023-12-20

## 2023-12-20 RX ORDER — LANOLIN ALCOHOL/MO/W.PET/CERES
400 CREAM (GRAM) TOPICAL EVERY MORNING
Status: DISCONTINUED | OUTPATIENT
Start: 2023-12-21 | End: 2023-12-21 | Stop reason: HOSPADM

## 2023-12-20 RX ORDER — HYDROMORPHONE HCL IN WATER/PF 6 MG/30 ML
0.2 PATIENT CONTROLLED ANALGESIA SYRINGE INTRAVENOUS
Status: DISCONTINUED | OUTPATIENT
Start: 2023-12-20 | End: 2023-12-21 | Stop reason: HOSPADM

## 2023-12-20 RX ADMIN — Medication 400 MG: at 21:42

## 2023-12-20 RX ADMIN — MIDAZOLAM 2 MG: 1 INJECTION INTRAMUSCULAR; INTRAVENOUS at 09:07

## 2023-12-20 RX ADMIN — DEXAMETHASONE SODIUM PHOSPHATE 4 MG: 4 INJECTION, SOLUTION INTRA-ARTICULAR; INTRALESIONAL; INTRAMUSCULAR; INTRAVENOUS; SOFT TISSUE at 09:49

## 2023-12-20 RX ADMIN — Medication 2 G: at 09:42

## 2023-12-20 RX ADMIN — OXYCODONE HYDROCHLORIDE 5 MG: 5 TABLET ORAL at 19:00

## 2023-12-20 RX ADMIN — Medication 125 MCG: at 14:03

## 2023-12-20 RX ADMIN — PSEUDOEPHEDRINE HCL 120 MG: 120 TABLET, FILM COATED, EXTENDED RELEASE ORAL at 21:42

## 2023-12-20 RX ADMIN — MONTELUKAST SODIUM 10 MG: 10 TABLET, FILM COATED ORAL at 21:43

## 2023-12-20 RX ADMIN — ACETAMINOPHEN 975 MG: 325 TABLET, FILM COATED ORAL at 14:03

## 2023-12-20 RX ADMIN — BUPIVACAINE HYDROCHLORIDE 20 ML: 5 INJECTION, SOLUTION EPIDURAL; INTRACAUDAL at 09:00

## 2023-12-20 RX ADMIN — SODIUM CHLORIDE, POTASSIUM CHLORIDE, SODIUM LACTATE AND CALCIUM CHLORIDE: 600; 310; 30; 20 INJECTION, SOLUTION INTRAVENOUS at 11:34

## 2023-12-20 RX ADMIN — ASPIRIN 325 MG: 325 TABLET, COATED ORAL at 14:03

## 2023-12-20 RX ADMIN — CLINDAMYCIN PHOSPHATE 900 MG: 900 INJECTION, SOLUTION INTRAVENOUS at 21:42

## 2023-12-20 RX ADMIN — TRANEXAMIC ACID 1950 MG: 650 TABLET ORAL at 08:37

## 2023-12-20 RX ADMIN — LIDOCAINE HYDROCHLORIDE 100 MG: 20 INJECTION, SOLUTION INFILTRATION; PERINEURAL at 09:35

## 2023-12-20 RX ADMIN — ROCURONIUM BROMIDE 40 MG: 50 INJECTION, SOLUTION INTRAVENOUS at 09:35

## 2023-12-20 RX ADMIN — TRAMADOL HYDROCHLORIDE 100 MG: 50 TABLET, COATED ORAL at 21:41

## 2023-12-20 RX ADMIN — PREGABALIN 150 MG: 50 CAPSULE ORAL at 14:02

## 2023-12-20 RX ADMIN — FENTANYL CITRATE 50 MCG: 50 INJECTION INTRAMUSCULAR; INTRAVENOUS at 09:35

## 2023-12-20 RX ADMIN — PROPOFOL 200 MG: 10 INJECTION, EMULSION INTRAVENOUS at 09:35

## 2023-12-20 RX ADMIN — PHENYLEPHRINE HYDROCHLORIDE 100 MCG: 10 INJECTION INTRAVENOUS at 09:47

## 2023-12-20 RX ADMIN — DOCUSATE SODIUM 50 MG AND SENNOSIDES 8.6 MG 1 TABLET: 8.6; 5 TABLET, FILM COATED ORAL at 21:41

## 2023-12-20 RX ADMIN — TRAMADOL HYDROCHLORIDE 50 MG: 50 TABLET, COATED ORAL at 15:28

## 2023-12-20 RX ADMIN — CALCIUM POLYCARBOPHIL 625 MG: 625 TABLET, FILM COATED ORAL at 17:17

## 2023-12-20 RX ADMIN — PREGABALIN 150 MG: 50 CAPSULE ORAL at 21:43

## 2023-12-20 RX ADMIN — ACETAMINOPHEN 975 MG: 325 TABLET, FILM COATED ORAL at 21:41

## 2023-12-20 RX ADMIN — CLINDAMYCIN PHOSPHATE 900 MG: 900 INJECTION, SOLUTION INTRAVENOUS at 15:28

## 2023-12-20 RX ADMIN — SUGAMMADEX 200 MG: 100 INJECTION, SOLUTION INTRAVENOUS at 11:47

## 2023-12-20 RX ADMIN — FENTANYL CITRATE 50 MCG: 50 INJECTION INTRAMUSCULAR; INTRAVENOUS at 10:00

## 2023-12-20 RX ADMIN — ONDANSETRON 4 MG: 2 INJECTION INTRAMUSCULAR; INTRAVENOUS at 11:36

## 2023-12-20 RX ADMIN — PHENYLEPHRINE HYDROCHLORIDE 100 MCG: 10 INJECTION INTRAVENOUS at 10:15

## 2023-12-20 RX ADMIN — PHENYLEPHRINE HYDROCHLORIDE 0.3 MCG/KG/MIN: 10 INJECTION INTRAVENOUS at 09:49

## 2023-12-20 RX ADMIN — SODIUM CHLORIDE, POTASSIUM CHLORIDE, SODIUM LACTATE AND CALCIUM CHLORIDE: 600; 310; 30; 20 INJECTION, SOLUTION INTRAVENOUS at 08:37

## 2023-12-20 ASSESSMENT — ACTIVITIES OF DAILY LIVING (ADL)
ADLS_ACUITY_SCORE: 20

## 2023-12-20 ASSESSMENT — LIFESTYLE VARIABLES: TOBACCO_USE: 1

## 2023-12-20 NOTE — ANESTHESIA POSTPROCEDURE EVALUATION
Patient: Melissa Templeton    Procedure: Procedure(s):  LEFT TOTAL SHOULDER ARTHROPLASTY REVERSE       Anesthesia Type:  General    Note:     Postop Pain Control: Uneventful            Sign Out: Well controlled pain   PONV: No   Neuro/Psych: Uneventful            Sign Out: Acceptable/Baseline neuro status   Airway/Respiratory: Uneventful            Sign Out: Acceptable/Baseline resp. status   CV/Hemodynamics: Uneventful            Sign Out: Acceptable CV status; No obvious hypovolemia; No obvious fluid overload   Other NRE: NONE   DID A NON-ROUTINE EVENT OCCUR? No           Last vitals:  Vitals Value Taken Time   /61 12/20/23 1300   Temp 36.4  C (97.6  F) 12/20/23 1200   Pulse 77 12/20/23 1303   Resp 10 12/20/23 1303   SpO2 93 % 12/20/23 1302   Vitals shown include unfiled device data.    Electronically Signed By: Geri Porter MD  December 20, 2023  1:18 PM

## 2023-12-20 NOTE — OP NOTE
[facility]  OPERATIVE NOTE:    Melissa Templeton  [id number]  Date of Surgery: 12/20/2023     PRE-OPERATIVE DIAGNOSIS:  Arthritis of left shoulder region [M19.012]    POST-OPERATIVE DIAGNOSIS:  same    PROCEDURE:  Procedure(s):  LEFT TOTAL SHOULDER ARTHROPLASTY REVERSE    SURGEON:  Surgeon(s) and Role:     * Ambar Wilson MD - Primary     * Ra Prieto MD - Assisting     * Vito Garcia PA-C - Assisting    DESCRIPTION OF PROCEDURE:    Patient taken the operating placed in the regular supine fashion.  After adequate induction of general anesthetic he was placed Abita position head and neck were stabilized in neutral fashion.  She was moved to the left side of the table in order to gain range of motion during time of surgery.  She was given 2 g of Ancef for infection prophylaxis given 1 hour prior incision then performed sterile prep and drape of the left shoulder left upper extremity or sterile prep and drape a deltopectoral incision was made.  We bluntly dissected down onto the deltopectoral interval and identified the cephalic vein and retracted laterally with the deltoid released the adhesions in the subdeltoid area.  Identified the conjoined tendon and the biceps tendon.  I then took down the capsule and the subscap approximately 1 fingerbreadth medial to the biceps.  We then did a complete exposure of the capsule superiorly and inferiorly and remove the inferior osteophyte and anterior and posterior osteophytes from the humeral head.  We then osteotomized the surgical neck using an oscillating saw femoral head was then taken back table, and sized.  We then repaired the humeral canal for the humeral stem ended up using a size 2 stem.  The stem was left in place.  And the stem was oriented to the patient's retroversion approximately 20 degrees.  We then placed retractors anteriorly superiorly and posteriorly using it from clear to retractor to expose the glenoid.  The capsule  was released from the subscap and the entire glenoid was exposed removing the labrum and any scar tissue and osteophyte around the neck of the glenoid.  We then identified our entry point for the baseplate screw for the glenosphere.  The central guidepin was placed.  Due to 25 mm.  There is central glenosphere screw was placed with good fixation.  We then placed the 2 additional screws and plate for rigid fixation.  We then placed the glenosphere.  We then did a trial reduction and shoulder was very stable good tensioning of the conjoined tendon and the deltoid.  No evidence of impingement.  We then remove the trial components on the humeral side put in the actual stem.  We did use autogenous bone graft on the humeral side.  Then placed drill holes in the along the medial calcar of the humerus for sutures to repair the subscap.  Use #2 FiberWire sutures to repair the subscap.  Once this is complete to left shoulder and a dilute solution of Betadine for 3 minutes and irrigated using pulsatile    .  I then approximated the deltopectoral interval using all Vicryl suture.  Subcu was closed using 2-0 Vicryl skin was closed with running 3-0 Monocryl in a subcuticular stitch.  Prineo dressing was applied followed by shoulder immobilizer.  Patient tolerated the procedure there were no intraoperative complication.  The patient sent to [facility] in stable addition.  Plan to be for patient to be in the shoulder immobilizer for 3 weeks.  Started content.  Peter wants to get 24 hours of Ancef for infection prophylaxis 30 days of aspirin for DVT prophylaxis this ends dictation.  Should be noted assistant Gareth Garcia was used throughout the entirety of case he was critical for the safe transfer the patient to the hospital bed to the operating table and for retraction and closure.

## 2023-12-20 NOTE — PROVIDER NOTIFICATION
Spoke to ortho PA to inform that the patient is requesting her home PTA Tramadol for nerve pain.  He stated it's ok to order Tramadol 50mg twice daily and Tramadol 100mg at bedtime.

## 2023-12-20 NOTE — ANESTHESIA CARE TRANSFER NOTE
Patient: Melissa Templeton    Procedure: Procedure(s):  LEFT TOTAL SHOULDER ARTHROPLASTY REVERSE       Diagnosis: Arthritis of left shoulder region [M19.012]  Diagnosis Additional Information: No value filed.    Anesthesia Type:   General     Note:    Oropharynx: oropharynx clear of all foreign objects and spontaneously breathing  Level of Consciousness: awake  Oxygen Supplementation: face mask  Level of Supplemental Oxygen (L/min / FiO2): 6  Independent Airway: airway patency satisfactory and stable  Dentition: dentition unchanged  Vital Signs Stable: post-procedure vital signs reviewed and stable  Report to RN Given: handoff report given  Patient transferred to: PACU  Comments: Patient comfortable  Handoff Report: Identifed the Patient, Identified the Reponsible Provider, Reviewed the pertinent medical history, Discussed the surgical course, Reviewed Intra-OP anesthesia mangement and issues during anesthesia, Set expectations for post-procedure period and Allowed opportunity for questions and acknowledgement of understanding      Vitals:  Vitals Value Taken Time   /74 12/20/23 1200   Temp     Pulse 80 12/20/23 1203   Resp 18 12/20/23 1203   SpO2 93 % 12/20/23 1203   Vitals shown include unfiled device data.    Electronically Signed By: INDIA Hebert CRNA  December 20, 2023  12:04 PM

## 2023-12-20 NOTE — BRIEF OP NOTE
[facility]    Brief Operative Note    Pre-operative diagnosis: Arthritis of left shoulder region [M19.012]  Post-operative diagnosis Same as pre-operative diagnosis    Procedure: LEFT TOTAL SHOULDER ARTHROPLASTY REVERSE, Left - Shoulder    Surgeon: Surgeon(s) and Role:     * Ambar Wilson MD - Primary     * Ra Prieto MD - Assisting     * Vito Garcia PA-C - Assisting  Anesthesia: General   Estimated Blood Loss: Less than 10 ml    Drains: None  Specimens: * No specimens in log *  Findings:   None.  Complications: None.  Implants:   Implant Name Type Inv. Item Serial No.  Lot No. LRB No. Used Action   BASEPLATE STD 25MM - [id number] Total Joint Component/Insert BASEPLATE STD 25MM [id number] [employer]  Left 1 Implanted   SCREW CENTRAL 6.5X30MM [id number] - [id number] Metallic Hardware/[place] SCREW CENTRAL 6.5X30MM [id number]  [employer] [id number] Left 1 Implanted   SCREW PERIPHERAL 5.0X30MM [id number] - [id number] Metallic Hardware/[place] SCREW PERIPHERAL 5.0X30MM [id number]  [employer] [id number] Left 2 Implanted   Cannulated CoCr Lateralized Glen[place] CoCr + [id number] 36mm, Latealized: +3mm   [id number] [employer]  Left 1 Implanted   TRAY REVERSE HIGH OFFSET +0 [id number] - [id number] Total Joint Component/Insert TRAY REVERSE HIGH OFFSET +0 [id number] [employer]  Left 1 Implanted   STEM HUMERAL ANATOMIC STD PTC 2B - [id number] Total Joint Component/Insert STEM HUMERAL ANATOMIC STD PTC 2B [id number] [employer]  Left 1 Implanted   INSERT REVISION REVERSE +6/12 36MM - [id number] Total Joint Component/Insert INSERT REVISION REVERSE +6/12 36MM [id number] [employer]  Left 1 Implanted

## 2023-12-20 NOTE — ANESTHESIA PROCEDURE NOTES
Airway       Patient location during procedure: OR       Procedure Start/Stop Times: 12/20/2023 9:38 AM  Staff -        Anesthesiologist:  Geri Porter MD       CRNA: Heaven Waters       Performed By: CRNA  Consent for Airway        Urgency: elective  Indications and Patient Condition       Indications for airway management: parish-procedural       Induction type:intravenous       Mask difficulty assessment: 2 - vent by mask + OA or adjuvant +/- NMBA    Final Airway Details       Final airway type: endotracheal airway       Successful airway: ETT - single and Oral  Endotracheal Airway Details        ETT size (mm): 7.0       Cuffed: yes       Successful intubation technique: video laryngoscopy       VL Blade Size: Glidescope 3       Grade View of Cords: 1       Adjucts: stylet       Position: Right       Measured from: gums/teeth       Secured at (cm): 20       Bite block used: None    Post intubation assessment        Placement verified by: capnometry, equal breath sounds and chest rise        Number of attempts at approach: 1       Number of other approaches attempted: 0       Secured with: tape       Ease of procedure: easy       Dentition: Intact and Unchanged    Medication(s) Administered   Medication Administration Time: 12/20/2023 9:38 AM

## 2023-12-20 NOTE — PLAN OF CARE
Arrived from PACU at 1320.  A&OX4.  Up with assist of 1.  LUE in sling.  Voiding in the bathroom.  Tolerating regular diet.  Denies shoulder pain.  Scheduled toradol given for neuropathy pain.  Ambulated on nursing unit.  Plan to discharge home tomorrow.

## 2023-12-20 NOTE — ANESTHESIA PREPROCEDURE EVALUATION
Anesthesia Pre-Procedure Evaluation    Patient: Melissa Templeton   MRN: [id number] : 1950        Procedure : Procedure(s):  LEFT TOTAL SHOULDER ARTHROPLASTY REVERSE          Past Medical History:   Diagnosis Date    Allergic rhinitis     Allergic rhinitis, cause unspecified     Asthma     Atypical chest pain     Bronchospasm     Chronic constipation     Diabetes mellitus (H)     Fluid overload     Health maintenance alteration     Hyperlipidemia     Hypoglycemia, unspecified     Obese     KRISTIN (obstructive sleep apnea)     Osteoarthrosis, unspecified whether generalized or localized, unspecified site     Sleep apnea     cpap    Uncomplicated asthma     Unspecified cataract     Uveitis     Vitamin D deficiency       Past Surgical History:   Procedure Laterality Date    APPENDECTOMY      ARTHROPLASTY KNEE Right 2018    Procedure: ARTHROPLASTY KNEE;  RIGHT TOTAL KNEE ARTHROPLASTY    EBL: 5mL;  Surgeon: Ambar Wilson MD;  Location:  OR    ARTHROPLASTY KNEE Left 2022    Procedure: LEFT TOTAL KNEE ARTHROPLASTY;  Surgeon: Ambar Wilson MD;  Location:  OR    ARTHROPLASTY SHOULDER Right 2019    Procedure: RIGHT TOTAL SHOULDER ARTHROPLASTY REVERSE (TORNIER)^;  Surgeon: Ambar Wilson MD;  Location:  OR    BACK SURGERY      CHOLECYSTECTOMY      COLONOSCOPY      COLONOSCOPY      EYE SURGERY      FUSION CERVICAL ANTERIOR TWO LEVELS N/A 3/22/2021    Procedure: CERVICAL 7 CORPECTOMY, CERVICAL 6-THORACIC 1 ANTERIOR SPINAL FUSION USING AN INTERVETEBRAL PROSTHESIS, LOCAL AUTOGRAFT AND INSTRUMENTATION;  Surgeon: Regan Collier MD;  Location:  OR    GI SURGERY      GYN SURGERY      MARYJO BSO    LAMINECTOMY, FUSION LUMBAR THREE+ LEVEL, COMBINED N/A 2020    Procedure: L3,L4,L5 LAMINECTOMIES; POSTERIOR SPINAL FUSION; POSTERIOR LUMBAR INTERBODY FUSION L3-S1 USING INTERVERTEBRAL PROSTHESIS; LOCAL AUTOGRAFT AND INSTRUMENTATION.;  Surgeon: Regan Collier MD;  Location:  OR     ORTHOPEDIC SURGERY      dulce. rotator cuffs    ORTHOPEDIC SURGERY      lumbar laminectomy    [facility] NONSPECIFIC PROCEDURE      Hysterectomy fibroids - bilat S&O    [facility] NONSPECIFIC PROCEDURE  1998    Discectomy L5-S1    [facility] NONSPECIFIC PROCEDURE      Cosmetic below eyes      Allergies   Allergen Reactions    Magnesium Citrate Shortness Of Breath and Other (See Comments)     And CONFUSION    Cephalexin      unknown    Erythromycin      nausea    Other Food Allergy      prunes-gi upset    Statins Muscle Pain (Myalgia)    Nickel Rash      Social History     Tobacco Use    Smoking status: Former     Packs/day: 2.00     Years: 30.00     Additional pack years: 0.00     Total pack years: 60.00     Types: Cigarettes     Quit date:      Years since quittin.9    Smokeless tobacco: Never   Substance Use Topics    Alcohol use: Yes     Comment: OCCASIONALLY--2/month      Wt Readings from Last 1 Encounters:   22 83.7 kg (184 lb 9.6 oz)        Anesthesia Evaluation   Pt has had prior anesthetic.     No history of anesthetic complications       ROS/MED HX  ENT/Pulmonary:     (+) sleep apnea, uses CPAP,         allergic rhinitis,     tobacco use, Past use,    Moderate Persistent, asthma                  Neurologic: Comment: Spinal stenosis with neurogenic claudication    Cervical stenosis with myelopathy    Spinal cord stimulator in place      Cardiovascular:     (+) Dyslipidemia hypertension- -   -  - -                                      METS/Exercise Tolerance:     Hematologic:       Musculoskeletal: Comment: S/p cervical fusion  (+)  arthritis,             GI/Hepatic:    (-) GERD   Renal/Genitourinary:  - neg Renal ROS     Endo:     (+)  type II DM,   Not using insulin,          Obesity,       Psychiatric/Substance Use:     (+)    H/O chronic opiod use .     Infectious Disease:       Malignancy:       Other:            Physical Exam    Airway        Mallampati: II   TM distance: > 3 FB   Neck ROM: full   Mouth  "opening: > 3 cm    Respiratory Devices and Support         Dental       (+) Minor Abnormalities - some fillings, tiny chips      Cardiovascular   cardiovascular exam normal          Pulmonary   pulmonary exam normal                OUTSIDE LABS:  CBC:   Lab Results   Component Value Date    WBC 7.4 02/08/2021    WBC 6.7 01/14/2020    HGB 12.6 11/13/2022    HGB 12.2 11/12/2022    HCT 42.6 02/08/2021    HCT 35.2 01/14/2020     02/08/2021     01/14/2020     BMP:   Lab Results   Component Value Date     03/24/2021     03/23/2021    POTASSIUM 3.5 11/13/2022    POTASSIUM 3.2 (L) 11/11/2022    CHLORIDE 102 03/24/2021    CHLORIDE 104 03/23/2021    CO2 29 03/24/2021    CO2 26 03/23/2021    BUN 11 03/24/2021    BUN 14 03/23/2021    CR 0.55 11/11/2022    CR 0.52 03/24/2021     (H) 11/13/2022     (H) 11/12/2022     COAGS: No results found for: \"PTT\", \"INR\", \"FIBR\"  POC:   Lab Results   Component Value Date     (H) 03/24/2021     HEPATIC:   Lab Results   Component Value Date    ALBUMIN 3.7 02/08/2021    PROTTOTAL 7.4 02/08/2021    ALT 29 02/08/2021    AST 18 02/08/2021    ALKPHOS 97 02/08/2021    BILITOTAL 0.5 02/08/2021     OTHER:   Lab Results   Component Value Date    LACT 3.8 (H) 03/16/2021    A1C 6.6 (H) 02/08/2021    MATTI 8.2 (L) 03/24/2021    MAG 2.2 03/23/2021    LIPASE 195 02/08/2021       Anesthesia Plan    ASA Status:  2    NPO Status:  NPO Appropriate    Anesthesia Type: General.     - Airway: ETT   Induction: Intravenous, Propofol.   Maintenance: Balanced.        Consents    Anesthesia Plan(s) and associated risks, benefits, and realistic alternatives discussed. Questions answered and patient/representative(s) expressed understanding.     - Discussed:     - Discussed with:  Patient            Postoperative Care    Pain management: Peripheral nerve block (Single Shot), IV analgesics.   PONV prophylaxis: Ondansetron (or other 5HT-3), Background Propofol Infusion "     Comments:               Geri Porter MD    I have reviewed the pertinent notes and labs in the chart from the past 30 days and (re)examined the patient.  Any updates or changes from those notes are reflected in this note.             # Drug Induced Platelet Defect: home medication list includes an antiplatelet medication

## 2023-12-20 NOTE — PROGRESS NOTES
S: Pt. was seen in the Pre-OP pool room. Brady ENGLE. RX: Left Ultrasling 3. DX: S/P left TSA.  O:A: Today I delivered an Ultrasling 3 left size Small to the Pre-OP Pool room.   P: Pt. to be seen as needed.    Sal GRANT, LO

## 2023-12-20 NOTE — ANESTHESIA PROCEDURE NOTES
Interscalene Procedure Note    Pre-Procedure   Staff -        Anesthesiologist:  Geri Porter MD       Performed By: anesthesiologist       Location: pre-op       Pre-Anesthestic Checklist: patient identified, IV checked, site marked, risks and benefits discussed, informed consent, monitors and equipment checked, pre-op evaluation, at physician/surgeon's request and post-op pain management  Timeout:       Correct Patient: Yes        Correct Procedure: Yes        Correct Site: Yes        Correct Position: Yes        Correct Laterality: Yes        Site Marked: Yes  Procedure Documentation  Procedure: Interscalene       Laterality: left       Patient Position: sitting       Patient Prep/Sterile Barriers: sterile gloves, mask, no-touch technique       Skin prep: Chloraprep       Local skin infiltrated with 3 mL of 1% lidocaine.        Needle Type: insulated and short bevel       Needle Gauge: 21.        Needle Length (millimeters): 50        Ultrasound guided       1. Ultrasound was used to identify targeted nerve, plexus, vascular marker, or fascial plane and place a needle adjacent to it in real-time.       2. Ultrasound was used to visualize the spread of anesthetic in close proximity to the above referenced structure.       3. A permanent image is entered into the patient's record.       4. The visualized anatomic structures appeared normal.       5. There were no apparent abnormal pathologic findings.    Assessment/Narrative         The placement was negative for: blood aspirated, painful injection and site bleeding       Paresthesias: No.       Test dose of mL at.         Test dose negative, 3 minutes after injection, for signs of intravascular, subdural, or intrathecal injection.       Bolus given via needle..        Secured via.        Insertion/Infusion Method: Single Shot       Complications: none       Injection made incrementally with aspirations every 5 mL.    Medication(s) Administered   Bupivacaine  "0.5% w/ 1:400K Epi (Injection) - Injection   20 mL - 12/20/2023 9:00:00 AM   Comments:  0.5% Bupivacaine with 1:400,000 epinephrine injected. Patient tolerated the procedure well.     The surgeon has given a verbal order transferring care of this patient to me for the performance of a regional analgesia block for post-op pain control. It is requested of me because I am uniquely trained and qualified to perform this block and the surgeon is neither trained nor qualified to perform this procedure.         FOR [facility] ([facility]) ONLY:   Pain Team Contact information: please page the Pain Team Via [employer]. Search \"Pain\". During daytime hours, please page the attending first. At night please page the resident first.      "

## 2023-12-21 ENCOUNTER — APPOINTMENT (OUTPATIENT)
Dept: OCCUPATIONAL THERAPY | Facility: CLINIC | Age: 73
End: 2023-12-21
Attending: PHYSICIAN ASSISTANT
Payer: MEDICARE

## 2023-12-21 VITALS
TEMPERATURE: 98 F | BODY MASS INDEX: 38.28 KG/M2 | HEIGHT: 60 IN | HEART RATE: 78 BPM | OXYGEN SATURATION: 92 % | SYSTOLIC BLOOD PRESSURE: 123 MMHG | WEIGHT: 195 LBS | RESPIRATION RATE: 16 BRPM | DIASTOLIC BLOOD PRESSURE: 63 MMHG

## 2023-12-21 LAB
GLUCOSE BLDC GLUCOMTR-MCNC: 100 MG/DL (ref 70–99)
HGB BLD-MCNC: 10.9 G/DL (ref 11.7–15.7)

## 2023-12-21 PROCEDURE — 250N000013 HC RX MED GY IP 250 OP 250 PS 637: Performed by: PHYSICIAN ASSISTANT

## 2023-12-21 PROCEDURE — 97110 THERAPEUTIC EXERCISES: CPT | Mod: GO

## 2023-12-21 PROCEDURE — 85018 HEMOGLOBIN: CPT | Performed by: PHYSICIAN ASSISTANT

## 2023-12-21 PROCEDURE — 82962 GLUCOSE BLOOD TEST: CPT

## 2023-12-21 PROCEDURE — 97535 SELF CARE MNGMENT TRAINING: CPT | Mod: GO

## 2023-12-21 PROCEDURE — 36415 COLL VENOUS BLD VENIPUNCTURE: CPT | Performed by: PHYSICIAN ASSISTANT

## 2023-12-21 PROCEDURE — 97165 OT EVAL LOW COMPLEX 30 MIN: CPT | Mod: GO

## 2023-12-21 RX ORDER — ACETAMINOPHEN 325 MG/1
650 TABLET ORAL EVERY 4 HOURS PRN
COMMUNITY
Start: 2023-12-21

## 2023-12-21 RX ORDER — ASPIRIN 81 MG/1
81 TABLET ORAL EVERY EVENING
COMMUNITY
Start: 2023-12-21

## 2023-12-21 RX ORDER — AMOXICILLIN 250 MG
1-2 CAPSULE ORAL 2 TIMES DAILY
COMMUNITY
Start: 2023-12-21

## 2023-12-21 RX ADMIN — TRAMADOL HYDROCHLORIDE 50 MG: 50 TABLET, COATED ORAL at 05:54

## 2023-12-21 RX ADMIN — LORATADINE 10 MG: 10 TABLET ORAL at 08:04

## 2023-12-21 RX ADMIN — GUAIFENESIN 1200 MG: 600 TABLET, EXTENDED RELEASE ORAL at 08:04

## 2023-12-21 RX ADMIN — ASPIRIN 325 MG: 325 TABLET, COATED ORAL at 08:04

## 2023-12-21 RX ADMIN — PREGABALIN 150 MG: 50 CAPSULE ORAL at 08:04

## 2023-12-21 RX ADMIN — OXYCODONE HYDROCHLORIDE 5 MG: 5 TABLET ORAL at 08:03

## 2023-12-21 RX ADMIN — ACETAMINOPHEN 975 MG: 325 TABLET, FILM COATED ORAL at 05:54

## 2023-12-21 RX ADMIN — DOCUSATE SODIUM 50 MG AND SENNOSIDES 8.6 MG 1 TABLET: 8.6; 5 TABLET, FILM COATED ORAL at 08:04

## 2023-12-21 RX ADMIN — Medication 2 SPRAY: at 08:06

## 2023-12-21 RX ADMIN — Medication 400 MCG: at 08:04

## 2023-12-21 RX ADMIN — Medication 125 MCG: at 08:04

## 2023-12-21 ASSESSMENT — ACTIVITIES OF DAILY LIVING (ADL)
ADLS_ACUITY_SCORE: 20
ADLS_ACUITY_SCORE: 23
ADLS_ACUITY_SCORE: 20

## 2023-12-21 NOTE — PLAN OF CARE
Occupational Therapy Discharge Summary    Reason for therapy discharge:    All goals and outcomes met, no further needs identified.    Progress towards therapy goal(s). See goals on Care Plan in [facility] electronic health record for goal details.  Goals met    Therapy recommendation(s):    Pt is functioning near baseline, somewhat limited by pain, precautions, balance, impacting ind with I/ADLs. Pt resides with spouse who is able to assist with all I/ADLs as needed. Pt currently SBA for mobility at room and hallway level with occasional spouse A x 1, pt with Min A for TB dressing, spouse reports he os able to assist with this at home. Pt with AE needs met for self cares. No further acute OT needs.

## 2023-12-21 NOTE — PROGRESS NOTES
12/21/23 1000   Appointment Info   Signing Clinician's Name / Credentials (OT) Sherie Sharma, OTR/L   Living Environment   People in Home spouse   Current Living Arrangements house   Home Accessibility stairs to enter home   Number of Stairs, Main Entrance 3   Stair Railings, Main Entrance railing on left side (ascending)   Transportation Anticipated family or friend will provide   Living Environment Comments Pt resides with spouse. Needs met on main level, tub shower combo, shower combo.   Self-Care   Usual Activity Tolerance moderate   Current Activity Tolerance moderate   Equipment Currently Used at Home cane, straight   Fall history within last six months no   Activity/Exercise/Self-Care Comment Pt ind ADLs baseline   Instrumental Activities of Daily Living (IADL)   IADL Comments Spouse able to A with IADLs   General Information   Onset of Illness/Injury or Date of Surgery 12/20/23   Referring Physician Vito Garcia PA-C   Patient/Family Therapy Goal Statement (OT) To go home   Additional Occupational Profile Info/Pertinent History of Current Problem Pt is a 74 y/o female s/p L TSA on 12/20/23. Pt is POD#1.   Existing Precautions/Restrictions brace worn when out of bed;fall;shoulder;lifting;weight bearing;other (see comments)  (Continue with shoulder immobilizer.  Okay to remove for ADLs.  Okay for active range of motion for elbow, wrist and digits.)   Left Upper Extremity (Weight-bearing Status) non weight-bearing (NWB)   Cognitive Status Examination   Orientation Status orientation to person, place and time   Visual Perception   Visual Impairment/Limitations WFL;corrective lenses full-time   Sensory   Sensory Comments pt denies numbness/tingling   Pain Assessment   Patient Currently in Pain   (4/10 in L shoulder at rest)   Range of Motion Comprehensive   Comment, General Range of Motion RUE WFL; LUE AROM WFL elbow to hand   Strength Comprehensive (MMT)   Comment, General Manual Muscle Testing (MMT)  Assessment RUE WFL; LUE NT d/t precautions   Coordination   Upper Extremity Coordination No deficits were identified   Transfers   Transfers sit-stand transfer;toilet transfer;shower transfer   Sit-Stand Transfer   Sit/Stand Transfer Comments SBA   Shower Transfer   Shower Transfer Comments SBA   Toilet Transfer   Toilet Transfer Comments SBA per clinical judgement   Balance   Balance Comments Mild unsteadiness noted with hallway mobility - pt reports she has SPC at home she can use   Activities of Daily Living   BADL Assessment/Intervention upper body dressing;lower body dressing   Upper Body Dressing Assessment/Training   Comment, (Upper Body Dressing) Min A   Lower Body Dressing Assessment/Training   Comment, (Lower Body Dressing) Min A   Clinical Impression   Criteria for Skilled Therapeutic Interventions Met (OT) Yes, treatment indicated   OT Diagnosis Decreased ind with I/ADLs   OT Problem List-Impairments impacting ADL problems related to;activity tolerance impaired;balance;strength;pain;post-surgical precautions   Assessment of Occupational Performance 3-5 Performance Deficits   Identified Performance Deficits dressing, bathing, toileting, heavy IADLs   Planned Therapy Interventions (OT) ADL retraining;IADL retraining;transfer training   Clinical Decision Making Complexity (OT) problem focused assessment/low complexity   Risk & Benefits of therapy have been explained patient;spouse/significant other   OT Total Evaluation Time   OT Eval, Low Complexity Minutes (92490) 10   OT Goals   Therapy Frequency (OT) One time eval and treatment   OT Predicted Duration/Target Date for Goal Attainment 12/21/23   OT Goals Upper Body Dressing;Lower Body Dressing;Transfers;OT Goal 1;OT Goal 2   OT: Upper Body Dressing Minimal assist;within precautions   OT: Lower Body Dressing Minimal assist;within precautions   OT: Transfer Supervision/stand-by assist;within precautions  (tub)   OT: Goal 1 Pt will ind verbalize/demo  understanding of LUE HEP for increased ROM to complete I/ADLs.   OT: Goal 2 Pt will demo 3 stairs with rail on L side going up to simulate home with SBA for increased safety/ind with I/ADLs.   Self-Care/Home Management   Self-Care/Home Mgmt/ADL, Compensatory, Meal Prep Minutes (00704) 25   Symptoms Noted During/After Treatment (Meal Preparation/Planning Training) fatigue   Treatment Detail/Skilled Intervention Patient greeted standing in room, spouse present, agreeable to OT. Patient educated on shoulder precautions including NWB LUE and sling on at all times except during exercises, skin checks, dressing and hygiene. Patient educated on one-handed dressing technique for LB dressing. Patient completes LB dressing task with one handed technique to don underwear, pants with Min A to pull up over hips. Patient educated on how to doff/don sling, patient completes this with Min A, VC for fasteners. Patient educated on UB dressing strategy of dangle technique/one-handed dressing to maintain shoulder precautions, patient completes UB dressing to doff gown and don tank top with Min A, VC for technique. Patient educated on additional strategies for safe completion of bathing including sitting down, lean forward and dangling surgical arm to wash under armpit for maintaining shoulder precautions. Patient educated on fall risk reduction strategies to implement at home such as removal of throw rugs, loose cords, and having good lighting. Pt educated on icing for pain management, to complete no longer than 20 minutes at a time and to keep a layer between ice pack and skin - pt provided with ice pack at end of session for pain.   Therapeutic Procedures/Exercise   Therapeutic Procedure: strength, endurance, ROM, flexibillity minutes (17135) 13   Symptoms Noted During/After Treatment fatigue   Treatment Detail/Skilled Intervention Patient completes sit > stand to/from chair with SBA. Pt mobilizes at hallway level to complete 3  stairs with rail on L side to simulate home, SBA for stairs, Ax 1 spouse escorting pt for stability, demo safe technique. Patient demonstrates stand > sit in chair at end of session with SBA. Pt edu on use of cane at home for additional stability. Patient educated on UE HEP for increased strength to complete I/ADLs, patient completes 1 set x 3-5 repetitions of Codman s exercises, as well as elbow flexion/extension, forearm pronation/supination, wrist flexion/extension, radial/ulnar deviation, composite fist/digit extension. Patient provided with handout. Pt in chair with needs met, alarm set.   OT Discharge Planning   OT Plan d/c   OT Discharge Recommendation (DC Rec)   (Defer to ortho)   OT Rationale for DC Rec Pt is functioning near baseline, somewhat limited by pain, precautions, balance, impacting ind with I/ADLs. Pt resides with spouse who is able to assist with all I/ADLs as needed. Pt currently SBA for mobility at room and hallway level with occasional spouse A x 1, pt with Min A for TB dressing, spouse reports he os able to assist with this at home. Pt with AE needs met for self cares. No further acute OT needs.   OT Brief overview of current status See above   Total Session Time   Timed Code Treatment Minutes 38   Total Session Time (sum of timed and untimed services) 48

## 2023-12-21 NOTE — PROGRESS NOTES
VSS, CMS intact. Up with 1. Pain managed with oxycodone, tylenol and Ultram. Dressing C/D/I. Reviewed AVS with teach back. Discharge medications and instruction sheets given. A&OX4. Discharge home, left floor via wheelchair.

## 2023-12-21 NOTE — PROGRESS NOTES
ORTHOPEDIC UPPER EXTREMITY PROGRESS NOTE    POD# 1  Patient is a 73 year old female who underwent Procedure(s):  LEFT TOTAL SHOULDER ARTHROPLASTY REVERSE on 2023. Pain is somewhat controlled, getting an oxycodone now. She has tramadol scheduled and oxycodone prn.  She was on tramadol PTA and discussed how to use combination of tramadol and oxycodone with her pain clinic prior to surgery.  We discussed discharge instructions including wound care.    Vitals:   Blood pressure 123/63, pulse 78, temperature 98  F (36.7  C), temperature source Oral, resp. rate 16, height 1.524 m (5'), weight 88.5 kg (195 lb), SpO2 92%.  Temp (24hrs), Av.8  F (36.6  C), Min:97.6  F (36.4  C), Max:98  F (36.7  C)      EXAM   The patient is awake and alert.   Sensation is intact.  Digital Flexion/Extension maintained.   Brisk cap refill.   The incision is covered with bulky surgical dressing .    Labs:   Recent Labs   Lab Test 22  1054 22  0956 21  0748   HGB 12.6 12.2 12.1     ASSESSMENT  S/p L rTSA   PLAN  1. DVT prophylaxis: aspirin  2. Weight Bearing NWB (Non weight bearing).  3. Wound Care leave undisturbed   4. Discharge anticipated date today Home with No Skilled Service  5. Cont Pain Control Oxycodone, Tylenol, and Tramadol  6. Continue with shoulder immobilizer.  Okay to remove for ADLs.  Okay for active range of motion for elbow, wrist and digits.    Demetria Florence PA-C  [facility]

## 2023-12-21 NOTE — PROGRESS NOTES
Patient vital signs are at baseline: Yes  Patient able to ambulate as they were prior to admission or with assist devices provided by therapies during their stay:  Yes  Patient MUST void prior to discharge:  Yes  Patient able to tolerate oral intake:  Yes  Pain has adequate pain control using Oral analgesics:  Yes  Does patient have an identified :  Yes  Has goal D/C date and time been discussed with patient:  Yes    PT is A&O x 4. VSS on CPAP with 2L of O2. Up with assist of 1. Voided adequately in bathroom. CMS intact. Left shoulder dressing CDI with sling in place. Schedule Tylenol and Ultram given for pain management.Plan to discharge today

## 2023-12-21 NOTE — PLAN OF CARE
Goal Outcome Evaluation:       Patient vital signs are at baseline: Yes  Patient able to ambulate as they were prior to admission or with assist devices provided by therapies during their stay:  Yes  Patient MUST void prior to discharge:  Yes  Patient able to tolerate oral intake:  Yes  Pain has adequate pain control using Oral analgesics:  Yes  Does patient have an identified :  Yes  Has goal D/C date and time been discussed with patient:  Yes    Pt A&Ox4. VSS on RA, baseline O2 88-90% on RA per pt report. Currently on CPAP w/ 2L of O2. Up 1A w/ GB. Voiding in BR. L shoulder dressing CDI, CMS intact. Sling in place. Pain controlled w/ PRN Oxy and scheduled Tylenol.  R PIV infusing LR@75cc/hr. Possible discharge tomorrow. Continue plan of care.

## 2023-12-22 LAB — GLUCOSE BLDC GLUCOMTR-MCNC: 125 MG/DL (ref 70–99)

## 2023-12-29 ENCOUNTER — DOCUMENTATION ONLY (OUTPATIENT)
Dept: OTHER | Facility: CLINIC | Age: 73
End: 2023-12-29
Payer: COMMERCIAL

## 2024-01-13 ENCOUNTER — HEALTH MAINTENANCE LETTER (OUTPATIENT)
Age: 74
End: 2024-01-13

## 2024-01-18 NOTE — PROGRESS NOTES
History:   Minimal complaints.  Pain controlled on oral meds. pre-op symptoms gone.  Tolerating regular diet  but still poor appetite.  BM this afternoon and much flatus. ,  Per patient she is ambulating in room with standby assistance.  Feels leg strength is better but generally fatigued  Vitals:   B/P: 140/85, T: 98.1, P: 96, R: 16       Lab Results   Component Value Date     01/08/2020     11/17/2008     07/29/2004    Lab Results   Component Value Date    CHLORIDE 106 01/08/2020    CHLORIDE 105 11/17/2008    CHLORIDE 102 07/29/2004    Lab Results   Component Value Date    BUN 9 01/08/2020    BUN 19 11/17/2008    BUN 17 07/29/2004      Lab Results   Component Value Date    POTASSIUM 4.0 01/08/2020    POTASSIUM 3.3 01/07/2020    POTASSIUM 3.5 06/13/2019    Lab Results   Component Value Date    CO2 25 01/08/2020    CO2 26 11/17/2008    CO2 26 07/29/2004    Lab Results   Component Value Date    CR 0.52 01/08/2020    CR 0.71 06/14/2019    CR 0.70 01/24/2018        Lab Results   Component Value Date    WBC 6.1 11/17/2008    WBC 6.7 07/29/2004    HGB 11.9 01/09/2020    HGB 12.4 01/08/2020    HGB 11.3 (L) 06/14/2019    HCT 38.7 11/17/2008    HCT 46.5 07/29/2004    MCV 92 11/17/2008    MCV 93 07/29/2004     11/17/2008     07/29/2004         Intake/Output Summary (Last 24 hours) at 1/10/2020 1357  Last data filed at 1/10/2020 0800  Gross per 24 hour   Intake 1160 ml   Output 645 ml   Net 515 ml      No results found for this or any previous visit (from the past 24 hour(s)).  \  Dressing:   Dry and intact.   Neuro:   Motor: 5/5   Sensation: Decreased in bilateral lower extremities below knees as preop  Abdomen:  Less distended not tender  Extremities:   No swelling or calf tenderness  A/P:   Stable POD # 3   Transfer to rehab today with hospitalist blessing.   4 = No assist / stand by assistance

## 2024-06-01 ENCOUNTER — HEALTH MAINTENANCE LETTER (OUTPATIENT)
Age: 74
End: 2024-06-01

## 2024-10-19 ENCOUNTER — HEALTH MAINTENANCE LETTER (OUTPATIENT)
Age: 74
End: 2024-10-19

## 2025-01-26 ENCOUNTER — HEALTH MAINTENANCE LETTER (OUTPATIENT)
Age: 75
End: 2025-01-26

## 2025-04-12 ENCOUNTER — HEALTH MAINTENANCE LETTER (OUTPATIENT)
Age: 75
End: 2025-04-12

## 2025-05-03 ENCOUNTER — HEALTH MAINTENANCE LETTER (OUTPATIENT)
Age: 75
End: 2025-05-03

## 2025-07-02 ENCOUNTER — DOCUMENTATION ONLY (OUTPATIENT)
Dept: SLEEP MEDICINE | Facility: CLINIC | Age: 75
End: 2025-07-02
Payer: MEDICARE

## 2025-07-02 NOTE — PROGRESS NOTES
STM Recheck:  Patient a [place] [facility] patient that uses [facility] and CPAP.  She is looking to transfer to [place] [facility]. Explained to patient we only do [facility] out of [place].  She will call back if she has questions or concerns.

## 2025-08-16 ENCOUNTER — HEALTH MAINTENANCE LETTER (OUTPATIENT)
Age: 75
End: 2025-08-16

## (undated) DEVICE — BONE CLEANING TIP INTERPULSE  0210-010-000

## (undated) DEVICE — COVER TABLE POLY 65X90" 8186

## (undated) DEVICE — GLOVE PROTEXIS BLUE W/NEU-THERA 7.0  2D73EB70

## (undated) DEVICE — STPL SKIN PROXIMATE 35 WIDE PMW35

## (undated) DEVICE — SOL WATER IRRIG 1000ML BOTTLE 2F7114

## (undated) DEVICE — SU NDL MAYO TROCAR MED 217003

## (undated) DEVICE — SOL ADH LIQUID BENZOIN SWAB 0.6ML C1544

## (undated) DEVICE — BLADE SAW SAGITTAL STRK 19.5X90X1.20MM 2108-109-000S17

## (undated) DEVICE — GUIDE WIRE

## (undated) DEVICE — LINEN TOWEL PACK X5 5464

## (undated) DEVICE — SPONGE BALL KERLIX ROUND XL W/O STRING LATEX 4935

## (undated) DEVICE — POSITIONER PT PRONESAFE HEAD REST W/DERMAPROX INSERT 40599

## (undated) DEVICE — SU ETHIBOND 0 CTX CR  8X18" CX31D

## (undated) DEVICE — DRAPE STERI U 1015

## (undated) DEVICE — SU STRATAFIX PDS PLUS 0 CT-1 18" SXPP1A401

## (undated) DEVICE — DRAPE IOBAN INCISE 23X17" 6650EZ

## (undated) DEVICE — GLOVE PROTEXIS W/NEU-THERA 6.5  2D73TE65

## (undated) DEVICE — NDL SPINAL 18GA 3.5" 405184

## (undated) DEVICE — SPONGE LAP 18X18" X8435

## (undated) DEVICE — BONE CEMENT MIXEVAC III HI VAC KIT  0206-015-000

## (undated) DEVICE — PREP CHLORAPREP 26ML TINTED ORANGE  260815

## (undated) DEVICE — SOL NACL 0.9% IRRIG 1000ML BOTTLE 2F7124

## (undated) DEVICE — SU FIBERWIRE 5 CCS-1 BLUE  AR-7211

## (undated) DEVICE — DRAPE STERI TOWEL SM 1000

## (undated) DEVICE — SU VICRYL 3-0 PS-2 18" UND J497G

## (undated) DEVICE — SOL BENZOIN 0.5OZ

## (undated) DEVICE — DRSG KERLIX 4 1/2"X4YDS ROLL 6715

## (undated) DEVICE — SU STRATAFIX PDS PLUS 0 CT 45CM SXPP1A406

## (undated) DEVICE — DRSG ABDOMINAL 07 1/2X8" 7197D

## (undated) DEVICE — SUCTION IRR SYSTEM W/O TIP INTERPULSE HANDPIECE 0210-100-000

## (undated) DEVICE — DRSG STERI STRIP 1/2X4" R1547

## (undated) DEVICE — ESU GROUND PAD UNIVERSAL W/O CORD

## (undated) DEVICE — DRAPE SHEET REV FOLD 3/4 9349

## (undated) DEVICE — DRILL BIT PERIPHERAL SCREW 3.2MM MWJ126

## (undated) DEVICE — GLOVE PROTEXIS MICRO 8.0  2D73PM80

## (undated) DEVICE — DRAPE STERI TOWEL LG 1010

## (undated) DEVICE — SU DERMABOND PRINEO 22CM CLR222US

## (undated) DEVICE — GLOVE PROTEXIS BLUE W/NEU-THERA 8.5  2D73EB85

## (undated) DEVICE — SU VICRYL 2-0 CP-1 27" J466H

## (undated) DEVICE — SPONGE SURGIFOAM 100 1974

## (undated) DEVICE — SLING ARM LG 79-99157

## (undated) DEVICE — MANIFOLD NEPTUNE 4 PORT 700-20

## (undated) DEVICE — SU VICRYL 0 CP-1 27" J467H

## (undated) DEVICE — DRAIN ROUND W/RESERV KIT JACKSON PRATT 10FR 400ML SU130-402D

## (undated) DEVICE — SPONGE RAY-TEC 4X8" 7318

## (undated) DEVICE — IONM UP TO 7 HOURS

## (undated) DEVICE — SOLUTION WOUND CLEANSING 3/4OZ 10% PVP EA-L3011FB-50

## (undated) DEVICE — TOOL DISSECT MIDAS MR8 14CM MATCH HEAD 3MM MR8-14MH30

## (undated) DEVICE — SU VICRYL 1 CT-1 27" J341H

## (undated) DEVICE — GOWN XXL 9575

## (undated) DEVICE — SPONGE KITTNER 31001010

## (undated) DEVICE — SYR 50ML LL W/O NDL 309653

## (undated) DEVICE — GLOVE PROTEXIS W/NEU-THERA 7.5  2D73TE75

## (undated) DEVICE — PIN FIXATION MEDT ANT CERVICAL ATLANTIS 2MM 7080902

## (undated) DEVICE — BLADE SAW RECIP STRK 70X12.5X1.2MM 0277-096-281

## (undated) DEVICE — NDL SPINAL 22GA 3.5" QUINCKE 405181

## (undated) DEVICE — CAST PADDING 6" UNSTERILE 9046

## (undated) DEVICE — PIN DISTRACTION ANCHOR FOR SCR 14MM MDS9091414

## (undated) DEVICE — DRSG GAUZE 4X4" 3033

## (undated) DEVICE — NDL BLUNT 15GA 1.5"

## (undated) DEVICE — STPL SKIN 35W 6.9MM  PXW35

## (undated) DEVICE — PACK TOTAL KNEE SOP15TKFSD

## (undated) DEVICE — ESU ELEC BLADE 2.75" COATED/INSULATED E1455

## (undated) DEVICE — SU VICRYL 0 CT-1 CR 8X18" J740D

## (undated) DEVICE — GUIDEWIRE TORNIER AEQUALIS PERFORM +  2.5X220MM DWD017

## (undated) DEVICE — ESU PENCIL W/HOLSTER E2350H

## (undated) DEVICE — Device

## (undated) DEVICE — SYR 50ML CATH TIP W/O NDL 309620

## (undated) DEVICE — NDL 25GA 1.5" 305127

## (undated) DEVICE — SU ETHIBOND 2-0 CT-1 8X18" CX22D

## (undated) DEVICE — PACK SPINE SM CUSTOM SNE15SSFSK

## (undated) DEVICE — GOWN IMPERVIOUS SPECIALTY XL/XLONG 39049

## (undated) DEVICE — SUCTION CANISTER MEDIVAC LINER 3000ML W/LID 65651-530

## (undated) DEVICE — TAPE DRSG UNIVERSAL CLOTH 3" WHITE LATEX 881-3

## (undated) DEVICE — PREP CHLORAPREP 26ML TINTED HI-LITE ORANGE 930815

## (undated) DEVICE — GLOVE PROTEXIS MICRO 8.5  2D73PM85

## (undated) DEVICE — SOL NACL 0.9% IRRIG 3000ML BAG 2B7477

## (undated) DEVICE — BLADE SAW SAGITTAL STRK 19.5X95X1.27MM 2108-109-000S15

## (undated) DEVICE — DRSG KERLIX FLUFFS X5

## (undated) DEVICE — NDL 22GA 1.5"

## (undated) DEVICE — SU MONOCRYL 3-0 PS-2 27" Y427H

## (undated) DEVICE — SU VICRYL 0 CT-1 27" UND J260H

## (undated) DEVICE — SOL NACL 0.9% IRRIG 1000ML BOTTLE 07138-09

## (undated) DEVICE — ESU CLEANER TIP 31142717

## (undated) DEVICE — SU VICRYL 2-0 CP-1 27" UND J266H

## (undated) DEVICE — SUCTION FRAZIER 12FR W/HANDLE K73

## (undated) DEVICE — SU ETHIBOND 0 CT-1 CR 8X18" CX21D

## (undated) DEVICE — SU ETHIBOND 1 OS-4 30" X518H

## (undated) DEVICE — DRSG ADAPTIC 3X8" 6113

## (undated) DEVICE — SYR 03ML LL W/O NDL 309657

## (undated) DEVICE — GLOVE BIOGEL PI ULTRATOUCH SZ 8.0 41180

## (undated) DEVICE — PREP SKIN SCRUB TRAY 4461A

## (undated) DEVICE — DRAPE CONVERTORS U-DRAPE 60X72" 8476

## (undated) DEVICE — PACK UNIVERSAL SPLIT 29131

## (undated) DEVICE — SYR 10ML FINGER CONTROL W/O NDL 309695

## (undated) DEVICE — DRILL BIT MEDT ATLANTIS 2.4MM 7756010

## (undated) DEVICE — RX SURGIFLO W/THROMBIN KIT 2ML 1991

## (undated) DEVICE — GLOVE PROTEXIS BLUE W/NEU-THERA 8.0  2D73EB80

## (undated) DEVICE — DRAIN JACKSON PRATT RESERVOIR 100ML SU130-1305

## (undated) DEVICE — ESU PENCIL W/SMOKE EVAC NEPTUNE STRYKER 0703-046-000

## (undated) DEVICE — PACK LARGE SPINE SNE15LSFSE

## (undated) DEVICE — WRAP EZY KNEE

## (undated) DEVICE — CAST PADDING 6" STERILE 9046S

## (undated) DEVICE — SU MONOCRYL 4-0 PS-1 27'  UND Y935H

## (undated) DEVICE — DEVICE RETRIEVER HEWSON 71111579

## (undated) DEVICE — SU FIBERWIRE 2 38"  AR-7200

## (undated) DEVICE — PACK OPEN SHOULDER SOP15OCFSC

## (undated) DEVICE — NDL 19GA 1.5"

## (undated) DEVICE — RX SURGIFLO HEMOSTATIC MATRIX W/THROMBIN 8ML 2994

## (undated) DEVICE — BNDG COBAN 6"X5YDS STERILE

## (undated) DEVICE — GLOVE PROTEXIS W/NEU-THERA 8.0  2D73TE80

## (undated) DEVICE — BLADE BONE MILL STRK 3.2MM FINE 5400-702-000

## (undated) DEVICE — SU VICRYL 2-0 CT-2 27" UND J269H

## (undated) DEVICE — GLOVE BIOGEL PI MICRO INDICATOR UNDERGLOVE SZ 8.0 48980

## (undated) DEVICE — BLADE SAW SAGITTAL STRK 18X90X1.27MM HD SYS 6 6118-127-090

## (undated) DEVICE — MIDAS REX DISSECTING TOOL  14MH30

## (undated) DEVICE — DRAIN JACKSON PRATT 10FR ROUND SU130-1321

## (undated) DEVICE — DRAPE OVERHEAD TABLE

## (undated) DEVICE — BLADE SAW SAGITTAL STRK 18X90X1.19MM HD SYS 6 6118-119-090

## (undated) DEVICE — BLADE CLIPPER 4412A

## (undated) DEVICE — BUR ROUND 4MM CARBIDE LONG 5092-228

## (undated) DEVICE — IMM KNEE 20" 0814-2660

## (undated) DEVICE — GLOVE PROTEXIS W/NEU-THERA 8.5  2D73TE85

## (undated) DEVICE — DRAIN CHANNEL ROUND 15FR FLUTED 072188

## (undated) DEVICE — IMM COLLAR CERVICAL MED UNIVERSAL 3X24" 79-83500

## (undated) RX ORDER — PROPOFOL 10 MG/ML
INJECTION, EMULSION INTRAVENOUS
Status: DISPENSED
Start: 2018-01-23

## (undated) RX ORDER — GABAPENTIN 100 MG/1
CAPSULE ORAL
Status: DISPENSED
Start: 2020-01-07

## (undated) RX ORDER — NEOSTIGMINE METHYLSULFATE 1 MG/ML
VIAL (ML) INJECTION
Status: DISPENSED
Start: 2019-06-13

## (undated) RX ORDER — CEFAZOLIN SODIUM 2 G/100ML
INJECTION, SOLUTION INTRAVENOUS
Status: DISPENSED
Start: 2020-01-07

## (undated) RX ORDER — DEXAMETHASONE SODIUM PHOSPHATE 4 MG/ML
INJECTION, SOLUTION INTRA-ARTICULAR; INTRALESIONAL; INTRAMUSCULAR; INTRAVENOUS; SOFT TISSUE
Status: DISPENSED
Start: 2018-01-23

## (undated) RX ORDER — REMIFENTANIL HYDROCHLORIDE 1 MG/ML
INJECTION, POWDER, LYOPHILIZED, FOR SOLUTION INTRAVENOUS
Status: DISPENSED
Start: 2021-03-16

## (undated) RX ORDER — ACETAMINOPHEN 325 MG/1
TABLET ORAL
Status: DISPENSED
Start: 2020-01-07

## (undated) RX ORDER — GLYCOPYRROLATE 0.2 MG/ML
INJECTION, SOLUTION INTRAMUSCULAR; INTRAVENOUS
Status: DISPENSED
Start: 2020-01-07

## (undated) RX ORDER — PROPOFOL 10 MG/ML
INJECTION, EMULSION INTRAVENOUS
Status: DISPENSED
Start: 2021-03-16

## (undated) RX ORDER — PROPOFOL 10 MG/ML
INJECTION, EMULSION INTRAVENOUS
Status: DISPENSED
Start: 2023-12-20

## (undated) RX ORDER — CEFAZOLIN SODIUM 2 G/100ML
INJECTION, SOLUTION INTRAVENOUS
Status: DISPENSED
Start: 2021-03-16

## (undated) RX ORDER — FENTANYL CITRATE 0.05 MG/ML
INJECTION, SOLUTION INTRAMUSCULAR; INTRAVENOUS
Status: DISPENSED
Start: 2021-03-22

## (undated) RX ORDER — HYDROMORPHONE HYDROCHLORIDE 1 MG/ML
INJECTION, SOLUTION INTRAMUSCULAR; INTRAVENOUS; SUBCUTANEOUS
Status: DISPENSED
Start: 2021-03-22

## (undated) RX ORDER — LIDOCAINE HYDROCHLORIDE 20 MG/ML
INJECTION, SOLUTION EPIDURAL; INFILTRATION; INTRACAUDAL; PERINEURAL
Status: DISPENSED
Start: 2021-03-16

## (undated) RX ORDER — PROPOFOL 10 MG/ML
INJECTION, EMULSION INTRAVENOUS
Status: DISPENSED
Start: 2022-11-11

## (undated) RX ORDER — POVIDONE-IODINE 10 MG/G
OINTMENT TOPICAL
Status: DISPENSED
Start: 2021-03-22

## (undated) RX ORDER — GLYCOPYRROLATE 0.2 MG/ML
INJECTION, SOLUTION INTRAMUSCULAR; INTRAVENOUS
Status: DISPENSED
Start: 2019-06-13

## (undated) RX ORDER — HYDROMORPHONE HYDROCHLORIDE 1 MG/ML
INJECTION, SOLUTION INTRAMUSCULAR; INTRAVENOUS; SUBCUTANEOUS
Status: DISPENSED
Start: 2022-11-11

## (undated) RX ORDER — FENTANYL CITRATE 50 UG/ML
INJECTION, SOLUTION INTRAMUSCULAR; INTRAVENOUS
Status: DISPENSED
Start: 2019-06-13

## (undated) RX ORDER — REMIFENTANIL HYDROCHLORIDE 1 MG/ML
INJECTION, POWDER, LYOPHILIZED, FOR SOLUTION INTRAVENOUS
Status: DISPENSED
Start: 2021-03-22

## (undated) RX ORDER — ONDANSETRON 2 MG/ML
INJECTION INTRAMUSCULAR; INTRAVENOUS
Status: DISPENSED
Start: 2019-06-13

## (undated) RX ORDER — ACETAMINOPHEN 325 MG/1
TABLET ORAL
Status: DISPENSED
Start: 2021-03-16

## (undated) RX ORDER — FENTANYL CITRATE 50 UG/ML
INJECTION, SOLUTION INTRAMUSCULAR; INTRAVENOUS
Status: DISPENSED
Start: 2018-01-23

## (undated) RX ORDER — FENTANYL CITRATE 50 UG/ML
INJECTION, SOLUTION INTRAMUSCULAR; INTRAVENOUS
Status: DISPENSED
Start: 2021-03-22

## (undated) RX ORDER — CEFAZOLIN SODIUM 1 G/3ML
INJECTION, POWDER, FOR SOLUTION INTRAMUSCULAR; INTRAVENOUS
Status: DISPENSED
Start: 2018-01-23

## (undated) RX ORDER — CEFAZOLIN SODIUM 2 G/100ML
INJECTION, SOLUTION INTRAVENOUS
Status: DISPENSED
Start: 2019-06-13

## (undated) RX ORDER — ALBUMIN, HUMAN INJ 5% 5 %
SOLUTION INTRAVENOUS
Status: DISPENSED
Start: 2019-06-13

## (undated) RX ORDER — KETOROLAC TROMETHAMINE 30 MG/ML
INJECTION, SOLUTION INTRAMUSCULAR; INTRAVENOUS
Status: DISPENSED
Start: 2018-01-23

## (undated) RX ORDER — PHENYLEPHRINE HYDROCHLORIDE 10 MG/ML
INJECTION INTRAVENOUS
Status: DISPENSED
Start: 2020-01-07

## (undated) RX ORDER — LIDOCAINE HYDROCHLORIDE 20 MG/ML
INJECTION, SOLUTION EPIDURAL; INFILTRATION; INTRACAUDAL; PERINEURAL
Status: DISPENSED
Start: 2020-01-07

## (undated) RX ORDER — ROPIVACAINE HYDROCHLORIDE 2 MG/ML
INJECTION, SOLUTION EPIDURAL; INFILTRATION; PERINEURAL
Status: DISPENSED
Start: 2018-01-23

## (undated) RX ORDER — PROPOFOL 10 MG/ML
INJECTION, EMULSION INTRAVENOUS
Status: DISPENSED
Start: 2021-03-22

## (undated) RX ORDER — ACYCLOVIR 200 MG/1
CAPSULE ORAL
Status: DISPENSED
Start: 2018-01-23

## (undated) RX ORDER — TRANEXAMIC ACID 650 MG/1
TABLET ORAL
Status: DISPENSED
Start: 2022-11-11

## (undated) RX ORDER — LIDOCAINE HYDROCHLORIDE 20 MG/ML
INJECTION, SOLUTION EPIDURAL; INFILTRATION; INTRACAUDAL; PERINEURAL
Status: DISPENSED
Start: 2022-11-11

## (undated) RX ORDER — ONDANSETRON 2 MG/ML
INJECTION INTRAMUSCULAR; INTRAVENOUS
Status: DISPENSED
Start: 2018-01-23

## (undated) RX ORDER — GABAPENTIN 100 MG/1
CAPSULE ORAL
Status: DISPENSED
Start: 2021-03-16

## (undated) RX ORDER — ALBUTEROL SULFATE 90 UG/1
AEROSOL, METERED RESPIRATORY (INHALATION)
Status: DISPENSED
Start: 2020-01-07

## (undated) RX ORDER — CEFAZOLIN SODIUM 1 G/3ML
INJECTION, POWDER, FOR SOLUTION INTRAMUSCULAR; INTRAVENOUS
Status: DISPENSED
Start: 2020-01-07

## (undated) RX ORDER — PROPOFOL 10 MG/ML
INJECTION, EMULSION INTRAVENOUS
Status: DISPENSED
Start: 2019-06-13

## (undated) RX ORDER — DEXAMETHASONE SODIUM PHOSPHATE 4 MG/ML
INJECTION, SOLUTION INTRA-ARTICULAR; INTRALESIONAL; INTRAMUSCULAR; INTRAVENOUS; SOFT TISSUE
Status: DISPENSED
Start: 2022-11-11

## (undated) RX ORDER — LABETALOL HYDROCHLORIDE 5 MG/ML
INJECTION, SOLUTION INTRAVENOUS
Status: DISPENSED
Start: 2021-03-22

## (undated) RX ORDER — KETAMINE HCL IN NACL, ISO-OSM 100MG/10ML
SYRINGE (ML) INJECTION
Status: DISPENSED
Start: 2020-01-07

## (undated) RX ORDER — ALBUMIN, HUMAN INJ 5% 5 %
SOLUTION INTRAVENOUS
Status: DISPENSED
Start: 2020-01-07

## (undated) RX ORDER — BUPIVACAINE HYDROCHLORIDE AND EPINEPHRINE 2.5; 5 MG/ML; UG/ML
INJECTION, SOLUTION EPIDURAL; INFILTRATION; INTRACAUDAL; PERINEURAL
Status: DISPENSED
Start: 2019-06-13

## (undated) RX ORDER — HYDROMORPHONE HYDROCHLORIDE 1 MG/ML
INJECTION, SOLUTION INTRAMUSCULAR; INTRAVENOUS; SUBCUTANEOUS
Status: DISPENSED
Start: 2020-01-07

## (undated) RX ORDER — PROPOFOL 10 MG/ML
INJECTION, EMULSION INTRAVENOUS
Status: DISPENSED
Start: 2020-01-07

## (undated) RX ORDER — BUPIVACAINE HYDROCHLORIDE AND EPINEPHRINE 2.5; 5 MG/ML; UG/ML
INJECTION, SOLUTION EPIDURAL; INFILTRATION; INTRACAUDAL; PERINEURAL
Status: DISPENSED
Start: 2020-01-07

## (undated) RX ORDER — ONDANSETRON 2 MG/ML
INJECTION INTRAMUSCULAR; INTRAVENOUS
Status: DISPENSED
Start: 2021-03-16

## (undated) RX ORDER — DEXAMETHASONE SODIUM PHOSPHATE 4 MG/ML
INJECTION, SOLUTION INTRA-ARTICULAR; INTRALESIONAL; INTRAMUSCULAR; INTRAVENOUS; SOFT TISSUE
Status: DISPENSED
Start: 2019-06-13

## (undated) RX ORDER — LIDOCAINE HYDROCHLORIDE 20 MG/ML
INJECTION, SOLUTION EPIDURAL; INFILTRATION; INTRACAUDAL; PERINEURAL
Status: DISPENSED
Start: 2019-06-13

## (undated) RX ORDER — TRANEXAMIC ACID 650 MG/1
TABLET ORAL
Status: DISPENSED
Start: 2023-12-20

## (undated) RX ORDER — BETAMETHASONE SODIUM PHOSPHATE AND BETAMETHASONE ACETATE 3; 3 MG/ML; MG/ML
INJECTION, SUSPENSION INTRA-ARTICULAR; INTRALESIONAL; INTRAMUSCULAR; SOFT TISSUE
Status: DISPENSED
Start: 2021-03-22

## (undated) RX ORDER — DEXAMETHASONE SODIUM PHOSPHATE 4 MG/ML
INJECTION, SOLUTION INTRA-ARTICULAR; INTRALESIONAL; INTRAMUSCULAR; INTRAVENOUS; SOFT TISSUE
Status: DISPENSED
Start: 2020-01-07

## (undated) RX ORDER — NEOSTIGMINE METHYLSULFATE 1 MG/ML
VIAL (ML) INJECTION
Status: DISPENSED
Start: 2020-01-07

## (undated) RX ORDER — SCOLOPAMINE TRANSDERMAL SYSTEM 1 MG/1
PATCH, EXTENDED RELEASE TRANSDERMAL
Status: DISPENSED
Start: 2020-01-07

## (undated) RX ORDER — CEFAZOLIN SODIUM 2 G/100ML
INJECTION, SOLUTION INTRAVENOUS
Status: DISPENSED
Start: 2021-03-22

## (undated) RX ORDER — FENTANYL CITRATE 50 UG/ML
INJECTION, SOLUTION INTRAMUSCULAR; INTRAVENOUS
Status: DISPENSED
Start: 2020-01-07

## (undated) RX ORDER — HYDROMORPHONE HYDROCHLORIDE 1 MG/ML
INJECTION, SOLUTION INTRAMUSCULAR; INTRAVENOUS; SUBCUTANEOUS
Status: DISPENSED
Start: 2019-06-13

## (undated) RX ORDER — FENTANYL CITRATE 50 UG/ML
INJECTION, SOLUTION INTRAMUSCULAR; INTRAVENOUS
Status: DISPENSED
Start: 2021-03-16

## (undated) RX ORDER — DEXAMETHASONE SODIUM PHOSPHATE 4 MG/ML
INJECTION, SOLUTION INTRA-ARTICULAR; INTRALESIONAL; INTRAMUSCULAR; INTRAVENOUS; SOFT TISSUE
Status: DISPENSED
Start: 2021-03-16

## (undated) RX ORDER — FENTANYL CITRATE 50 UG/ML
INJECTION, SOLUTION INTRAMUSCULAR; INTRAVENOUS
Status: DISPENSED
Start: 2023-12-20

## (undated) RX ORDER — CEFAZOLIN SODIUM/WATER 2 G/20 ML
SYRINGE (ML) INTRAVENOUS
Status: DISPENSED
Start: 2022-11-11

## (undated) RX ORDER — ACETAMINOPHEN 325 MG/1
TABLET ORAL
Status: DISPENSED
Start: 2021-03-22

## (undated) RX ORDER — FENTANYL CITRATE 50 UG/ML
INJECTION, SOLUTION INTRAMUSCULAR; INTRAVENOUS
Status: DISPENSED
Start: 2022-11-11